# Patient Record
Sex: FEMALE | Race: WHITE | Employment: FULL TIME | URBAN - METROPOLITAN AREA
[De-identification: names, ages, dates, MRNs, and addresses within clinical notes are randomized per-mention and may not be internally consistent; named-entity substitution may affect disease eponyms.]

---

## 2017-01-05 ENCOUNTER — TELEPHONE (OUTPATIENT)
Dept: SURGERY | Age: 37
End: 2017-01-05

## 2017-01-05 NOTE — TELEPHONE ENCOUNTER
Patient called office states her doctor was suppose to fax over paperwork a few weeks ago.  Paperwork has not been received, i have given patient our fax number she states she will call the doctor back to have him refax the documents

## 2017-01-27 ENCOUNTER — OFFICE VISIT (OUTPATIENT)
Dept: SURGERY | Age: 37
End: 2017-01-27

## 2017-01-27 VITALS
BODY MASS INDEX: 43.4 KG/M2 | DIASTOLIC BLOOD PRESSURE: 69 MMHG | OXYGEN SATURATION: 95 % | WEIGHT: 293 LBS | TEMPERATURE: 98.6 F | SYSTOLIC BLOOD PRESSURE: 134 MMHG | RESPIRATION RATE: 16 BRPM | HEART RATE: 102 BPM | HEIGHT: 69 IN

## 2017-01-27 DIAGNOSIS — E66.01 MORBID OBESITY WITH BMI OF 50.0-59.9, ADULT (HCC): Primary | ICD-10-CM

## 2017-01-27 RX ORDER — ERGOCALCIFEROL 1.25 MG/1
CAPSULE ORAL
COMMUNITY
Start: 2017-01-04 | End: 2019-02-26

## 2017-01-27 RX ORDER — DIPHENHYDRAMINE HCL 25 MG
TABLET,DISINTEGRATING ORAL
Refills: 12 | COMMUNITY
Start: 2016-12-16 | End: 2019-05-28

## 2017-01-27 RX ORDER — FAMOTIDINE 20 MG/1
TABLET, FILM COATED ORAL
COMMUNITY
Start: 2017-01-18 | End: 2017-11-08

## 2017-01-27 NOTE — PROGRESS NOTES
1. Have you been to the ER, urgent care clinic since your last visit? Hospitalized since your last visit?no    2. Have you seen or consulted any other health care providers outside of the [address] [place] [address] since your last visit? Include any pap smears or colon screening.  no

## 2017-01-30 NOTE — PROGRESS NOTES
Bariatric Surgery Consultation     Subjective:      The patient is a 38 y.o. obese  female with a Body mass index of Body mass index is 58.77 kg/(m^2). She has completed her insurance mandated 6 month physician supervised diet and excise waiting period.       Bariatric comorbidities present are        Patient Active Problem List   Diagnosis Code    Obesity in pregnancy with antepartum complication H08.130    Supervision of other high-risk pregnancy(V23.89)      Rubella non-immune status, antepartum O99.89, Z28.3    Papanicolaou smear of cervix with atypical squamous cells of undetermined significance (ASC-US) R87.610    Cervical high risk human papillomavirus (HPV) DNA test positive R87.810    Condyloma acuminata of vulva during pregnancy O98.319, A63.0    Normal labor O80, Z37.9   . The patient desires laparoscopic gastric bypass surgery for surgical weight loss. The patients goal weight is 190lb. The highest acceptable weight is 230lb  These goals are consistent with expected outcomes of their desired operation.  her Medical goals are to prevent development of diabetes and heard disease. her qualty of life goals are to have more energy and less pain so that she can do more with her daughter.          Patient Active Problem List     Diagnosis Date Noted    Normal labor 07/27/2015    Condyloma acuminata of vulva during pregnancy 07/08/2015    Papanicolaou smear of cervix with atypical squamous cells of undetermined significance (ASC-US) 06/04/2015    Cervical high risk human papillomavirus (HPV) DNA test positive 06/04/2015    Rubella non-immune status, antepartum 04/23/2015    Obesity in pregnancy with antepartum complication 69/05/0019    Supervision of other high-risk pregnancy(V23.89) 04/20/2015            Past Surgical History   Procedure Laterality Date    Hx oophorectomy   2015             History   Substance Use Topics    Smoking status: Former Smoker -- 1.00 packs/day for 15 years     Quit date: 11/01/2014    Smokeless tobacco: Never Used    Alcohol Use: 1.2 oz/week       1 Shots of liquor, 1 Cans of beer per week         Comment: rare            Family History   Problem Relation Age of Onset   [address] [facility] Arthritis-rheumatoid Mother      COPD Mother      Diabetes Father      Stroke Father      Hypertension Father      Cancer Maternal Uncle      Breast Cancer Neg Hx      Ovarian Cancer Neg Hx      Uterine Cancer Neg Hx      Diabetes Paternal Grandmother                Prior to Admission medications    Medication Sig Start Date End Date Taking? Authorizing Provider   PROAIR HFA 90 mcg/actuation inhaler Take 2 Puffs by inhalation as needed. Every 4 to 6 hours as needed 3/4/16   Yes Historical Provider   doxycycline (VIBRAMYCIN) 100 mg capsule Take 2 Caps by mouth daily. 3/4/16   Yes Historical Provider   HYDROcodone-acetaminophen (NORCO)  mg tablet   2/22/16   Yes Historical Provider   Omeprazole delayed release (PRILOSEC D/R) 20 mg tablet Take 1 Tab by mouth daily. 2/15/16   Yes Payton Weston MD   OTHER Indications: inhaler, uses as needed       Phys Other, MD   HYDROcodone-acetaminophen (NORCO) 5-325 mg per tablet Take 1 Tab by mouth every four (4) hours as needed for Pain. Max Daily Amount: 6 Tabs. 2/15/16     Payton Weston MD           Allergies   Allergen Reactions    Percocet [Oxycodone-Acetaminophen] Nausea and Vomiting          Review of Systems:   A comprehensive review of systems was negative except for that written in the HPI.     Objective:     Blood pressure 134/69, pulse (!) 102, temperature 98.6 °F (37 °C), temperature source Oral, resp.  rate 16, height 5' 9\" (1.753 m), weight (!) 398 lb (180.5 kg), last menstrual period 12/14/2016, SpO2 95 %.     Physical Exam:  GENERAL: alert, cooperative, no distress, appears stated age, morbidly obese, EYE: conjunctivae/corneas clear., EOM's intact., LYMPHATIC: Cervical, supraclavicular, nodes normal. , LUNG: clear to auscultation bilaterally, HEART: regular rate and rhythm, S1, S2 normal, no murmur, click, rub or gallop, ABDOMEN: soft, non-tender. Bowel sounds normal. No masses, no organomegaly, NEUROLOGIC: AOx3. Gait normal. motor strength normal and symmetric. sensation grossly intact.        Assessment:      Morbid obesity with comorbidity     Plan:      laparoscopic gastric bypass surgery     This is a 38 y.o. female with a BMI of Body mass index is Body mass index is 58.77 kg/(m^2). and the weight-related co-morbidties of pain in weight bearing joints. Sara Sanders meets the NIH criteria for bariatric surgery based upon the BMI of Body mass index is Body mass index is 58.77 kg/(m^2). and multiple weight-related co-morbidties. Sara Sanders has elected laparoscopic cesar-en-Y gastric bypass as her intervention of choice for treatment of morbid obestiy through surgical means secondary to its long term history of success.      In the office today, following Gigi's history and physical examination, a 30 minute discussion regarding the anatomic alterations for the laparoscopic cesar-en-Y gastric bypass was undertaken. The dietary expectations and the patient and physician dependent factors for success were thoroughly discussed, to include the need for interval follow-up and long-term dietary changes associated with success. The possible complications of the cesar-en-Y gastric bypass were also discussed, to include; staple line leak, bleeding, stricture, infection, internal hernia and pouch dilation. Specific weight related outcomes for success were also discussed with an emphasis on careful and close follow-up with the first year. The patient expressed an understanding of the above factors, and her questions were answered in their entirety.     In addition, the patient attended a 1.5 hour power point seminar regarding obesity, surgical weight loss including, adjustable gastric band, gastric bypass, and sleeve gastrectomy.  This discussion contrasted the different surgical techniques, mechanisms of actions and expected outcomes, and surgical and medical risks associated with each procedure. During this seminar, there was a long question and answer session where each questions was answered until there were no additional questions.      Today, the patient had all of her questions answered and desires to proceed with qualification for bariatric surgery initially choosing cesar-en-Y gastric bypass as her surgical option.

## 2017-01-31 LAB
H PYLORI IGA SER-ACNC: <9 UNITS (ref 0–8.9)
HBA1C MFR BLD: 5.7 % (ref 4.8–5.6)
TSH SERPL DL<=0.005 MIU/L-ACNC: 8.6 UIU/ML (ref 0.45–4.5)

## 2017-11-08 ENCOUNTER — APPOINTMENT (OUTPATIENT)
Dept: ULTRASOUND IMAGING | Age: 37
End: 2017-11-08
Attending: EMERGENCY MEDICINE
Payer: MEDICAID

## 2017-11-08 ENCOUNTER — APPOINTMENT (OUTPATIENT)
Dept: CT IMAGING | Age: 37
End: 2017-11-08
Attending: EMERGENCY MEDICINE
Payer: MEDICAID

## 2017-11-08 ENCOUNTER — APPOINTMENT (OUTPATIENT)
Dept: GENERAL RADIOLOGY | Age: 37
End: 2017-11-08
Attending: EMERGENCY MEDICINE
Payer: MEDICAID

## 2017-11-08 ENCOUNTER — HOSPITAL ENCOUNTER (EMERGENCY)
Age: 37
Discharge: HOME | End: 2017-11-08
Attending: EMERGENCY MEDICINE
Payer: MEDICAID

## 2017-11-08 VITALS
HEIGHT: 68 IN | OXYGEN SATURATION: 95 % | BODY MASS INDEX: 44.41 KG/M2 | HEART RATE: 91 BPM | SYSTOLIC BLOOD PRESSURE: 127 MMHG | RESPIRATION RATE: 15 BRPM | DIASTOLIC BLOOD PRESSURE: 66 MMHG | WEIGHT: 293 LBS | TEMPERATURE: 97.4 F

## 2017-11-08 DIAGNOSIS — R10.13 ABDOMINAL PAIN, EPIGASTRIC: Primary | ICD-10-CM

## 2017-11-08 DIAGNOSIS — R11.2 NON-INTRACTABLE VOMITING WITH NAUSEA, UNSPECIFIED VOMITING TYPE: ICD-10-CM

## 2017-11-08 LAB
ALBUMIN SERPL-MCNC: 3.5 G/DL (ref 3.5–5)
ALBUMIN/GLOB SERPL: 0.7 {RATIO} (ref 1.1–2.2)
ALP SERPL-CCNC: 77 U/L (ref 45–117)
ALT SERPL-CCNC: 24 U/L (ref 12–78)
ANION GAP SERPL CALC-SCNC: 13 MMOL/L (ref 5–15)
AST SERPL-CCNC: 22 U/L (ref 15–37)
BASOPHILS # BLD: 0 K/UL (ref 0–0.1)
BASOPHILS NFR BLD: 0 % (ref 0–1)
BILIRUB SERPL-MCNC: 0.4 MG/DL (ref 0.2–1)
BUN SERPL-MCNC: 9 MG/DL (ref 6–20)
BUN/CREAT SERPL: 12 (ref 12–20)
CALCIUM SERPL-MCNC: 9.4 MG/DL (ref 8.5–10.1)
CHLORIDE SERPL-SCNC: 104 MMOL/L (ref 97–108)
CO2 SERPL-SCNC: 24 MMOL/L (ref 21–32)
CREAT SERPL-MCNC: 0.74 MG/DL (ref 0.55–1.02)
DIFFERENTIAL METHOD BLD: ABNORMAL
EOSINOPHIL # BLD: 0.1 K/UL (ref 0–0.4)
EOSINOPHIL NFR BLD: 1 % (ref 0–7)
ERYTHROCYTE [DISTWIDTH] IN BLOOD BY AUTOMATED COUNT: 15 % (ref 11.5–14.5)
GLOBULIN SER CALC-MCNC: 4.9 G/DL (ref 2–4)
GLUCOSE SERPL-MCNC: 114 MG/DL (ref 65–100)
HCG UR QL: NEGATIVE
HCT VFR BLD AUTO: 41.9 % (ref 35–47)
HGB BLD-MCNC: 13.2 G/DL (ref 11.5–16)
LIPASE SERPL-CCNC: 109 U/L (ref 73–393)
LYMPHOCYTES # BLD: 1.8 K/UL (ref 0.8–3.5)
LYMPHOCYTES NFR BLD: 14 % (ref 12–49)
MCH RBC QN AUTO: 27.7 PG (ref 26–34)
MCHC RBC AUTO-ENTMCNC: 31.5 G/DL (ref 30–36.5)
MCV RBC AUTO: 88 FL (ref 80–99)
MONOCYTES # BLD: 0.7 K/UL (ref 0–1)
MONOCYTES NFR BLD: 6 % (ref 5–13)
NEUTS SEG # BLD: 10.1 K/UL (ref 1.8–8)
NEUTS SEG NFR BLD: 79 % (ref 32–75)
PLATELET # BLD AUTO: 444 K/UL (ref 150–400)
POTASSIUM SERPL-SCNC: 3.5 MMOL/L (ref 3.5–5.1)
PROT SERPL-MCNC: 8.4 G/DL (ref 6.4–8.2)
RBC # BLD AUTO: 4.76 M/UL (ref 3.8–5.2)
SODIUM SERPL-SCNC: 141 MMOL/L (ref 136–145)
WBC # BLD AUTO: 12.7 K/UL (ref 3.6–11)

## 2017-11-08 PROCEDURE — 85025 COMPLETE CBC W/AUTO DIFF WBC: CPT | Performed by: EMERGENCY MEDICINE

## 2017-11-08 PROCEDURE — 74177 CT ABD & PELVIS W/CONTRAST: CPT

## 2017-11-08 PROCEDURE — 81025 URINE PREGNANCY TEST: CPT

## 2017-11-08 PROCEDURE — 74022 RADEX COMPL AQT ABD SERIES: CPT

## 2017-11-08 PROCEDURE — 96376 TX/PRO/DX INJ SAME DRUG ADON: CPT

## 2017-11-08 PROCEDURE — 36415 COLL VENOUS BLD VENIPUNCTURE: CPT | Performed by: EMERGENCY MEDICINE

## 2017-11-08 PROCEDURE — 74011250636 HC RX REV CODE- 250/636: Performed by: EMERGENCY MEDICINE

## 2017-11-08 PROCEDURE — 80053 COMPREHEN METABOLIC PANEL: CPT | Performed by: EMERGENCY MEDICINE

## 2017-11-08 PROCEDURE — 99283 EMERGENCY DEPT VISIT LOW MDM: CPT

## 2017-11-08 PROCEDURE — 96375 TX/PRO/DX INJ NEW DRUG ADDON: CPT

## 2017-11-08 PROCEDURE — 83690 ASSAY OF LIPASE: CPT | Performed by: EMERGENCY MEDICINE

## 2017-11-08 PROCEDURE — 76705 ECHO EXAM OF ABDOMEN: CPT

## 2017-11-08 PROCEDURE — 74011250637 HC RX REV CODE- 250/637: Performed by: EMERGENCY MEDICINE

## 2017-11-08 PROCEDURE — 74011000250 HC RX REV CODE- 250: Performed by: EMERGENCY MEDICINE

## 2017-11-08 PROCEDURE — 96374 THER/PROPH/DIAG INJ IV PUSH: CPT

## 2017-11-08 PROCEDURE — 74011636320 HC RX REV CODE- 636/320: Performed by: EMERGENCY MEDICINE

## 2017-11-08 RX ORDER — FAMOTIDINE 10 MG/ML
20 INJECTION INTRAVENOUS
Status: COMPLETED | OUTPATIENT
Start: 2017-11-08 | End: 2017-11-08

## 2017-11-08 RX ORDER — PHENOL/SODIUM PHENOLATE
20 AEROSOL, SPRAY (ML) MUCOUS MEMBRANE 2 TIMES DAILY
Qty: 20 TAB | Refills: 0 | Status: SHIPPED | OUTPATIENT
Start: 2017-11-08 | End: 2017-11-18

## 2017-11-08 RX ORDER — FLUTICASONE PROPIONATE AND SALMETEROL 250; 50 UG/1; UG/1
1 POWDER RESPIRATORY (INHALATION) EVERY 12 HOURS
COMMUNITY
End: 2019-02-26

## 2017-11-08 RX ORDER — ONDANSETRON 2 MG/ML
4 INJECTION INTRAMUSCULAR; INTRAVENOUS
Status: COMPLETED | OUTPATIENT
Start: 2017-11-08 | End: 2017-11-08

## 2017-11-08 RX ORDER — SUCRALFATE 1 G/10ML
1 SUSPENSION ORAL 4 TIMES DAILY
Qty: 414 ML | Refills: 0 | Status: SHIPPED | OUTPATIENT
Start: 2017-11-08 | End: 2017-11-18

## 2017-11-08 RX ORDER — MORPHINE SULFATE 10 MG/ML
6 INJECTION, SOLUTION INTRAMUSCULAR; INTRAVENOUS
Status: COMPLETED | OUTPATIENT
Start: 2017-11-08 | End: 2017-11-08

## 2017-11-08 RX ORDER — ONDANSETRON 8 MG/1
8 TABLET, ORALLY DISINTEGRATING ORAL
Qty: 12 TAB | Refills: 0 | Status: SHIPPED | OUTPATIENT
Start: 2017-11-08 | End: 2017-11-15

## 2017-11-08 RX ORDER — CIPROFLOXACIN 500 MG/1
TABLET ORAL 2 TIMES DAILY
COMMUNITY
End: 2018-01-12

## 2017-11-08 RX ADMIN — FAMOTIDINE 20 MG: 10 INJECTION, SOLUTION INTRAVENOUS at 18:44

## 2017-11-08 RX ADMIN — ONDANSETRON 4 MG: 2 INJECTION INTRAMUSCULAR; INTRAVENOUS at 21:26

## 2017-11-08 RX ADMIN — LIDOCAINE HYDROCHLORIDE 40 ML: 20 SOLUTION ORAL; TOPICAL at 18:45

## 2017-11-08 RX ADMIN — MORPHINE SULFATE 6 MG: 10 INJECTION, SOLUTION INTRAVENOUS at 20:50

## 2017-11-08 RX ADMIN — IOPAMIDOL 100 ML: 755 INJECTION, SOLUTION INTRAVENOUS at 20:33

## 2017-11-08 RX ADMIN — ONDANSETRON 4 MG: 2 INJECTION INTRAMUSCULAR; INTRAVENOUS at 18:44

## 2017-11-08 NOTE — Clinical Note
- Zofran as needed for nausea/vomiting. 
- Begin Carafate and Prilosec pending follow-up with Dr. Kim Barajas. - Return to ED for fever, increased pain, persistent vomiting, any other concerns.

## 2017-11-08 NOTE — ED PROVIDER NOTES
Patient is a 42 y.o. female presenting with vomiting. The history is provided by the patient. Vomiting    This is a new problem. The current episode started 2 days ago. The problem occurs 5 to 10 times per day. The problem has been gradually worsening. The emesis has an appearance of stomach contents. Patient reports a subjective fever - was not measured. Associated symptoms include chills, a fever and abdominal pain. Pertinent negatives include no diarrhea and no cough. Past Medical History:   Diagnosis Date    Asthma     GERD (gastroesophageal reflux disease)     Second hand smoke exposure        Past Surgical History:   Procedure Laterality Date    HX OOPHORECTOMY  2015         Family History:   Problem Relation Age of Onset   [de-identified] Arthritis-rheumatoid Mother     COPD Mother     Diabetes Father     Stroke Father     Hypertension Father     Cancer Maternal Uncle     Diabetes Paternal Grandmother     Breast Cancer Neg Hx     Ovarian Cancer Neg Hx     Uterine Cancer Neg Hx        Social History     Social History    Marital status: SINGLE     Spouse name: N/A    Number of children: N/A    Years of education: N/A     Occupational History    Not on file. Social History Main Topics    Smoking status: Former Smoker     Packs/day: 1.00     Years: 15.00     Quit date: 11/1/2014    Smokeless tobacco: Never Used    Alcohol use 1.2 oz/week     1 Cans of beer, 1 Shots of liquor per week      Comment: rare    Drug use: No    Sexual activity: No     Other Topics Concern    Not on file     Social History Narrative         ALLERGIES: Percocet [oxycodone-acetaminophen]    Review of Systems   Constitutional: Positive for chills and fever. Respiratory: Negative for cough. Gastrointestinal: Positive for abdominal pain and vomiting. Negative for diarrhea. Genitourinary:        Patient on cipro for UTI   Musculoskeletal: Negative for back pain and neck pain.    All other systems reviewed and are negative. Vitals:    11/08/17 1807   BP: 139/88   Pulse: (!) 115   Resp: 26   Temp: 97.4 °F (36.3 °C)   SpO2: 96%   Weight: (!) 177.9 kg (392 lb 3.2 oz)   Height: 5' 8\" (1.727 m)            Physical Exam   Constitutional: She is oriented to person, place, and time. She appears well-developed and well-nourished. She appears distressed. HENT:   Head: Normocephalic and atraumatic. Mouth/Throat: Oropharynx is clear and moist.   Eyes: Conjunctivae and EOM are normal.   Neck: Normal range of motion. Cardiovascular: Regular rhythm, normal heart sounds and intact distal pulses. Tachycardia present. No murmur heard. Pulmonary/Chest: Effort normal and breath sounds normal. No stridor. Tachypnea noted. No respiratory distress. Abdominal: Soft. Bowel sounds are normal. There is tenderness in the right upper quadrant and epigastric area. There is no rebound and no guarding. Musculoskeletal: Normal range of motion. She exhibits no edema, tenderness or deformity. Neurological: She is alert and oriented to person, place, and time. No cranial nerve deficit. Skin: Skin is warm and dry. She is not diaphoretic. Psychiatric: She has a normal mood and affect. Nursing note and vitals reviewed. MDM  Number of Diagnoses or Management Options  Diagnosis management comments: Patient with upper abdominal pain - hx of gastritis in the past - check labs and get RUQ US to eval for biliary colic. Check labs for pancreatitis, check CXR to eval for perforation - r/o free air.  meds for symptoms    DDX - gastritis, pancreatitis, biliary colic, referred pain from UTI, medication side effect    6:58 PM  Change of shift. Care of patient signed over to Dr. Minoo Bruner. Handoff complete.        Amount and/or Complexity of Data Reviewed  Clinical lab tests: reviewed and ordered  Tests in the radiology section of CPT®: ordered      ED Course       Procedures

## 2017-11-09 NOTE — DISCHARGE INSTRUCTIONS
Abdominal Pain: Care Instructions  Your Care Instructions    Abdominal pain has many possible causes. Some aren't serious and get better on their own in a few days. Others need more testing and treatment. If your pain continues or gets worse, you need to be rechecked and may need more tests to find out what is wrong. You may need surgery to correct the problem. Don't ignore new symptoms, such as fever, nausea and vomiting, urination problems, pain that gets worse, and dizziness. These may be signs of a more serious problem. Your doctor may have recommended a follow-up visit in the next 8 to 12 hours. If you are not getting better, you may need more tests or treatment. The doctor has checked you carefully, but problems can develop later. If you notice any problems or new symptoms, get medical treatment right away. Follow-up care is a key part of your treatment and safety. Be sure to make and go to all appointments, and call your doctor if you are having problems. It's also a good idea to know your test results and keep a list of the medicines you take. How can you care for yourself at home? · Rest until you feel better. · To prevent dehydration, drink plenty of fluids, enough so that your urine is light yellow or clear like water. Choose water and other caffeine-free clear liquids until you feel better. If you have kidney, heart, or liver disease and have to limit fluids, talk with your doctor before you increase the amount of fluids you drink. · If your stomach is upset, eat mild foods, such as rice, dry toast or crackers, bananas, and applesauce. Try eating several small meals instead of two or three large ones. · Wait until 48 hours after all symptoms have gone away before you have spicy foods, alcohol, and drinks that contain caffeine. · Do not eat foods that are high in fat. · Avoid anti-inflammatory medicines such as aspirin, ibuprofen (Advil, Motrin), and naproxen (Aleve).  These can cause stomach upset. Talk to your doctor if you take daily aspirin for another health problem. When should you call for help? Call 911 anytime you think you may need emergency care. For example, call if:  ? · You passed out (lost consciousness). ? · You pass maroon or very bloody stools. ? · You vomit blood or what looks like coffee grounds. ? · You have new, severe belly pain. ?Call your doctor now or seek immediate medical care if:  ? · Your pain gets worse, especially if it becomes focused in one area of your belly. ? · You have a new or higher fever. ? · Your stools are black and look like tar, or they have streaks of blood. ? · You have unexpected vaginal bleeding. ? · You have symptoms of a urinary tract infection. These may include:  ¨ Pain when you urinate. ¨ Urinating more often than usual.  ¨ Blood in your urine. ? · You are dizzy or lightheaded, or you feel like you may faint. ? Watch closely for changes in your health, and be sure to contact your doctor if:  ? · You are not getting better after 1 day (24 hours). Where can you learn more? Go to http://arlene7mb Technologiesjoann.info/. Enter K976 in the search box to learn more about \"Abdominal Pain: Care Instructions. \"  Current as of: March 20, 2017  Content Version: 11.4  © 7345-1202 [employer]. Care instructions adapted under license by [employer] (which disclaims liability or warranty for this information). If you have questions about a medical condition or this instruction, always ask your healthcare professional. [place] [address] any warranty or liability for your use of this information. Nausea and Vomiting: Care Instructions  Your Care Instructions    When you are nauseated, you may feel weak and sweaty and notice a lot of saliva in your mouth. Nausea often leads to vomiting.  Most of the time you do not need to worry about nausea and vomiting, but they can be signs of other illnesses. Two common causes of nausea and vomiting are stomach flu and food poisoning. Nausea and vomiting from viral stomach flu will usually start to improve within 24 hours. Nausea and vomiting from food poisoning may last from 12 to 48 hours. The doctor has checked you carefully, but problems can develop later. If you notice any problems or new symptoms, get medical treatment right away. Follow-up care is a key part of your treatment and safety. Be sure to make and go to all appointments, and call your doctor if you are having problems. It's also a good idea to know your test results and keep a list of the medicines you take. How can you care for yourself at home? · To prevent dehydration, drink plenty of fluids, enough so that your urine is light yellow or clear like water. Choose water and other caffeine-free clear liquids until you feel better. If you have kidney, heart, or liver disease and have to limit fluids, talk with your doctor before you increase the amount of fluids you drink. · Rest in bed until you feel better. · When you are able to eat, try clear soups, mild foods, and liquids until all symptoms are gone for 12 to 48 hours. Other good choices include dry toast, crackers, cooked cereal, and gelatin dessert, such as Jell-O. When should you call for help? Call 911 anytime you think you may need emergency care. For example, call if:  ? · You passed out (lost consciousness). ?Call your doctor now or seek immediate medical care if:  ? · You have symptoms of dehydration, such as:  ¨ Dry eyes and a dry mouth. ¨ Passing only a little dark urine. ¨ Feeling thirstier than usual.   ? · You have new or worsening belly pain. ? · You have a new or higher fever. ? · You vomit blood or what looks like coffee grounds. ? Watch closely for changes in your health, and be sure to contact your doctor if:  ? · You have ongoing nausea and vomiting. ? · Your vomiting is getting worse.    ? · Your vomiting lasts longer than 2 days. ? · You are not getting better as expected. Where can you learn more? Go to http://arlene-joann.info/. Enter 25 [id number] in the search box to learn more about \"Nausea and Vomiting: Care Instructions. \"  Current as of: March 20, 2017  Content Version: 11.4  © 6866-1895 [employer] Care instructions adapted under license by [employer] (which disclaims liability or warranty for this information). If you have questions about a medical condition or this instruction, always ask your healthcare professional. [place] [address] any warranty or liability for your use of this information.

## 2017-11-09 NOTE — ED NOTES
7 PM  Change of shift. Care of patient taken over from Dr. Elías Bermudez; H&P reviewed, handoff complete. Awaiting U/S. Consider CT scan. U/S negative. Patient notes increased pain since U/S. Will proceed to CT scan. Patient notes hx ulcer. Followed by Dr. Betsy James. Plan Prilosec and Carafate pending. GI follow-up.

## 2017-11-09 NOTE — ED NOTES
The patient was discharged home by Dr Heath Magallanes in stable condition. The patient is alert and oriented, in no respiratory distress. The patient's diagnosis, condition and treatment were explained. The patient expressed understanding. A discharge plan has been developed. A  was not involved in the process. Aftercare instructions were given. Pt ambulatory out of the ED. Patient ambulatory to the bathroom, with a steady gait, to void again.

## 2017-11-09 NOTE — ED NOTES
Patient having nausea after the morphine; informed Dr Gabriella Long. \"I just really want a Tums. \" AIDET communication provided and informed of purposeful rounding to include collaboration of entire care team; patient acknowledged understanding.

## 2018-01-12 ENCOUNTER — HOSPITAL ENCOUNTER (EMERGENCY)
Age: 38
Discharge: HOME | End: 2018-01-12
Attending: EMERGENCY MEDICINE
Payer: MEDICAID

## 2018-01-12 VITALS
HEART RATE: 76 BPM | WEIGHT: 293 LBS | SYSTOLIC BLOOD PRESSURE: 128 MMHG | TEMPERATURE: 97.7 F | OXYGEN SATURATION: 98 % | BODY MASS INDEX: 59 KG/M2 | DIASTOLIC BLOOD PRESSURE: 79 MMHG | RESPIRATION RATE: 16 BRPM

## 2018-01-12 DIAGNOSIS — S39.012A LUMBAR STRAIN, INITIAL ENCOUNTER: Primary | ICD-10-CM

## 2018-01-12 PROCEDURE — 74011250636 HC RX REV CODE- 250/636: Performed by: NURSE PRACTITIONER

## 2018-01-12 PROCEDURE — 96372 THER/PROPH/DIAG INJ SC/IM: CPT

## 2018-01-12 PROCEDURE — 99283 EMERGENCY DEPT VISIT LOW MDM: CPT

## 2018-01-12 PROCEDURE — 74011250637 HC RX REV CODE- 250/637: Performed by: NURSE PRACTITIONER

## 2018-01-12 RX ORDER — FAMOTIDINE 20 MG/1
20 TABLET, FILM COATED ORAL DAILY
COMMUNITY
End: 2019-07-08

## 2018-01-12 RX ORDER — CYCLOBENZAPRINE HCL 10 MG
5 TABLET ORAL
Status: COMPLETED | OUTPATIENT
Start: 2018-01-12 | End: 2018-01-12

## 2018-01-12 RX ORDER — LEVOTHYROXINE SODIUM 125 UG/1
175 TABLET ORAL
COMMUNITY
End: 2019-05-28

## 2018-01-12 RX ORDER — KETOROLAC TROMETHAMINE 30 MG/ML
30 INJECTION, SOLUTION INTRAMUSCULAR; INTRAVENOUS
Status: COMPLETED | OUTPATIENT
Start: 2018-01-12 | End: 2018-01-12

## 2018-01-12 RX ORDER — CYCLOBENZAPRINE HCL 5 MG
5 TABLET ORAL
Qty: 12 TAB | Refills: 0 | Status: SHIPPED | OUTPATIENT
Start: 2018-01-12 | End: 2018-01-15

## 2018-01-12 RX ADMIN — CYCLOBENZAPRINE HYDROCHLORIDE 5 MG: 10 TABLET, FILM COATED ORAL at 14:47

## 2018-01-12 RX ADMIN — KETOROLAC TROMETHAMINE 30 MG: 30 INJECTION, SOLUTION INTRAMUSCULAR at 14:47

## 2018-01-12 NOTE — ED TRIAGE NOTES
Patient brought to ED treatment area via wheelchair for complaint of \"I was doing a lot of lifting and moving and I woke up yesterday morning with my lower back hurting. I think I may have a slipped disc. \" Patient denies history of slipped disc in past. Reports problems with lower back. Took tylenol without relief.

## 2018-01-12 NOTE — ED NOTES
Pt was discharged and given instructions by JORDAN Peck NP . Pt verbalized good understanding of all discharge instructions,prescriptions and F/U care. All questions answered. Pt in stable condition on discharge.

## 2018-01-12 NOTE — ED PROVIDER NOTES
HPI Comments: 42 y.o. female with past medical history significant for asthma and obesity who presents from home with chief complaint of low back pain. She states that 2 nights ago she was getting up off her bathroom floor to get her daughter out of the bathtub when she twisted her back causing her pain. Later that night she was moving a TV and thinks she \"overdid it\". She states that she thinks she pulled a muscle. Has a history of back pain which she attributes to her obesity. Has right sciatica intermittently at baseline. Denies sciatica at this time. Was ambulatory into the ED, but states that it hurts to ambulate. Denies recent steroid use, denies IV drug use, has no history of cancer. Denies numbness, tingling, paresthesias. Has been taking Tylenol at home with no pain relief. Reports shortness of breath at baseline from being obese, denies anything acute. There are no other acute medical concerns at this time. Denies fever, chills, headache, vision changes, chest pain, abdominal pain, vomiting/diarrhea/constipation,  symptoms. LMP 1/2/18  Social hx: denies smoking, occasional ETOH use, denies illicit drug use  PCP: Sonja Dumas MD        Patient is a 42 y.o. female presenting with back pain. The history is provided by the patient. Back Pain    Pertinent negatives include no chest pain, no fever, no numbness, no abdominal pain, no dysuria and no weakness.         Past Medical History:   Diagnosis Date    Asthma     GERD (gastroesophageal reflux disease)     Second hand smoke exposure        Past Surgical History:   Procedure Laterality Date    HX OOPHORECTOMY  2015         Family History:   Problem Relation Age of Onset   Jason Solis Arthritis-rheumatoid Mother     COPD Mother     Diabetes Father     Stroke Father     Hypertension Father     Cancer Maternal Uncle     Diabetes Paternal Grandmother     Breast Cancer Neg Hx     Ovarian Cancer Neg Hx     Uterine Cancer Neg Hx        Social History Social History    Marital status: SINGLE     Spouse name: N/A    Number of children: N/A    Years of education: N/A     Occupational History    Not on file. Social History Main Topics    Smoking status: Former Smoker     Packs/day: 1.00     Years: 15.00     Quit date: 11/1/2014    Smokeless tobacco: Never Used    Alcohol use 1.2 oz/week     1 Cans of beer, 1 Shots of liquor per week      Comment: rare    Drug use: No    Sexual activity: No     Other Topics Concern    Not on file     Social History Narrative         ALLERGIES: Percocet [oxycodone-acetaminophen]    Review of Systems   Constitutional: Negative for chills and fever. HENT: Negative for facial swelling. Eyes: Negative for visual disturbance. Respiratory: Negative for cough and shortness of breath. Cardiovascular: Negative for chest pain. Gastrointestinal: Negative for abdominal pain, constipation, diarrhea and vomiting. Genitourinary: Negative for difficulty urinating and dysuria. Musculoskeletal: Positive for back pain. Negative for gait problem and neck pain. Skin: Negative for rash. Neurological: Negative for dizziness, syncope, weakness and numbness. Psychiatric/Behavioral: Negative for confusion and decreased concentration. All other systems reviewed and are negative. Vitals:    01/12/18 1412   BP: 136/68   Pulse: 89   Resp: 18   Temp: 97.7 °F (36.5 °C)   SpO2: 97%   Weight: (!) 176 kg (388 lb)            Physical Exam   Constitutional: She is oriented to person, place, and time. She appears well-developed and well-nourished. No distress. HENT:   Head: Normocephalic and atraumatic. Eyes: Conjunctivae and EOM are normal. Pupils are equal, round, and reactive to light. Right eye exhibits no discharge. Left eye exhibits no discharge. Neck: Normal range of motion. Neck supple. No tracheal deviation present. No thyromegaly present.    Cardiovascular: Normal rate, regular rhythm, normal heart sounds and intact distal pulses. Exam reveals no gallop and no friction rub. No murmur heard. Pulmonary/Chest: Effort normal and breath sounds normal. No respiratory distress. She has no wheezes. She has no rales. Abdominal: Soft. She exhibits no distension. There is no tenderness. Musculoskeletal:        Lumbar back: She exhibits tenderness (left paraspinal tenderness). She exhibits no bony tenderness, no edema and no deformity. Back:    Exam limited by body habitus. No bony tenderness, step-offs, or deformities found on exam    Lymphadenopathy:     She has no cervical adenopathy. Neurological: She is alert and oriented to person, place, and time. She has normal strength. No cranial nerve deficit or sensory deficit. She exhibits normal muscle tone. Coordination normal. GCS eye subscore is 4. GCS verbal subscore is 5. GCS motor subscore is 6.   5/5 strength, no loss of sensation    Skin: Skin is warm and dry. No rash noted. Psychiatric: She has a normal mood and affect. Her behavior is normal. Judgment and thought content normal.   Nursing note and vitals reviewed. MDM  Number of Diagnoses or Management Options  Lumbar strain, initial encounter:   Diagnosis management comments: The patient is in overall good health. The patient denies a history of IV drug abuse, skin infections, or chronic back problems. The patient has low back pain without signs of spinal cord compression, cauda equina syndrome, infection, abscess, aneurysm, or other medically serious etiology. The patient is neurologically intact. Given the low risk of these diagnoses further testing and evaluation for these possibilities does not appear to be indicated at this time. The patient has been instructed to return if the symptoms worsen or change in any way. Discussed with patient the nature of her symptoms and she is comfortable not getting a x-ray at this time.      Likely a lumbar strain     Plan: stable for discharge, patient states that she does not tolerate ibuprofen so she will treat her pain with Tylenol. Will give prescription for flexeril. Patient instructed to gradually increase movement, alternate heat and ice, as well as some back exercises. Told to follow-up with her PCP and given return precautions to come back to the ED. She verbalized understanding of this plan     ED Course     2:48 PM  Discussed treatment options with the patient and mutually agreed not to get a lumbar film at this time. Will treat patient's pain and have her follow-up with PCP if symptoms persist. Attending RAQUEL Mckeon in agreement with this plan of care  Graeme Moon NP    3:26 PM  Patient still having some pain but states that she feels a little better. Ready for discharge.  Will follow-up with her PCP and return to the ED for any concerning symptoms   Graeme Moon NP      Procedures

## 2018-05-06 ENCOUNTER — HOSPITAL ENCOUNTER (EMERGENCY)
Age: 38
Discharge: HOME | End: 2018-05-06
Attending: EMERGENCY MEDICINE
Payer: MEDICAID

## 2018-05-06 VITALS
SYSTOLIC BLOOD PRESSURE: 142 MMHG | OXYGEN SATURATION: 100 % | BODY MASS INDEX: 44.41 KG/M2 | TEMPERATURE: 98.1 F | HEIGHT: 68 IN | DIASTOLIC BLOOD PRESSURE: 79 MMHG | RESPIRATION RATE: 20 BRPM | WEIGHT: 293 LBS | HEART RATE: 80 BPM

## 2018-05-06 DIAGNOSIS — R19.7 DIARRHEA, UNSPECIFIED TYPE: ICD-10-CM

## 2018-05-06 DIAGNOSIS — R10.84 ABDOMINAL PAIN, GENERALIZED: Primary | ICD-10-CM

## 2018-05-06 DIAGNOSIS — R11.2 NAUSEA AND VOMITING, INTRACTABILITY OF VOMITING NOT SPECIFIED, UNSPECIFIED VOMITING TYPE: ICD-10-CM

## 2018-05-06 LAB
ALBUMIN SERPL-MCNC: 3.7 G/DL (ref 3.5–5)
ALBUMIN/GLOB SERPL: 0.8 {RATIO} (ref 1.1–2.2)
ALP SERPL-CCNC: 86 U/L (ref 45–117)
ALT SERPL-CCNC: 29 U/L (ref 12–78)
ANION GAP SERPL CALC-SCNC: 12 MMOL/L (ref 5–15)
APPEARANCE UR: ABNORMAL
AST SERPL-CCNC: 18 U/L (ref 15–37)
BACTERIA URNS QL MICRO: ABNORMAL /HPF
BASOPHILS # BLD: 0 K/UL (ref 0–0.1)
BASOPHILS NFR BLD: 0 % (ref 0–1)
BILIRUB DIRECT SERPL-MCNC: <0.1 MG/DL (ref 0–0.2)
BILIRUB SERPL-MCNC: 0.4 MG/DL (ref 0.2–1)
BILIRUB UR QL CFM: NEGATIVE
BUN SERPL-MCNC: 8 MG/DL (ref 6–20)
BUN/CREAT SERPL: 10 (ref 12–20)
CALCIUM SERPL-MCNC: 9.3 MG/DL (ref 8.5–10.1)
CHLORIDE SERPL-SCNC: 102 MMOL/L (ref 97–108)
CO2 SERPL-SCNC: 25 MMOL/L (ref 21–32)
COLOR UR: ABNORMAL
CREAT SERPL-MCNC: 0.79 MG/DL (ref 0.55–1.02)
DIFFERENTIAL METHOD BLD: ABNORMAL
EOSINOPHIL # BLD: 0 K/UL (ref 0–0.4)
EOSINOPHIL NFR BLD: 0 % (ref 0–7)
EPITH CASTS URNS QL MICRO: ABNORMAL /LPF
ERYTHROCYTE [DISTWIDTH] IN BLOOD BY AUTOMATED COUNT: 14.5 % (ref 11.5–14.5)
GLOBULIN SER CALC-MCNC: 4.7 G/DL (ref 2–4)
GLUCOSE SERPL-MCNC: 131 MG/DL (ref 65–100)
GLUCOSE UR STRIP.AUTO-MCNC: NEGATIVE MG/DL
HCG UR QL: NEGATIVE
HCT VFR BLD AUTO: 40.8 % (ref 35–47)
HGB BLD-MCNC: 13.2 G/DL (ref 11.5–16)
HGB UR QL STRIP: NEGATIVE
KETONES UR QL STRIP.AUTO: 15 MG/DL
LEUKOCYTE ESTERASE UR QL STRIP.AUTO: NEGATIVE
LIPASE SERPL-CCNC: 107 U/L (ref 73–393)
LYMPHOCYTES # BLD: 1 K/UL (ref 0.8–3.5)
LYMPHOCYTES NFR BLD: 8 % (ref 12–49)
MCH RBC QN AUTO: 27.4 PG (ref 26–34)
MCHC RBC AUTO-ENTMCNC: 32.4 G/DL (ref 30–36.5)
MCV RBC AUTO: 84.8 FL (ref 80–99)
MONOCYTES # BLD: 0.5 K/UL (ref 0–1)
MONOCYTES NFR BLD: 4 % (ref 5–13)
MUCOUS THREADS URNS QL MICRO: ABNORMAL /LPF
NEUTS SEG # BLD: 11.2 K/UL (ref 1.8–8)
NEUTS SEG NFR BLD: 88 % (ref 32–75)
NITRITE UR QL STRIP.AUTO: NEGATIVE
PH UR STRIP: 6.5 [PH] (ref 5–8)
PLATELET # BLD AUTO: 484 K/UL (ref 150–400)
PMV BLD AUTO: 8.6 FL (ref 8.9–12.9)
POTASSIUM SERPL-SCNC: 3.5 MMOL/L (ref 3.5–5.1)
PROT SERPL-MCNC: 8.4 G/DL (ref 6.4–8.2)
PROT UR STRIP-MCNC: ABNORMAL MG/DL
RBC # BLD AUTO: 4.81 M/UL (ref 3.8–5.2)
RBC #/AREA URNS HPF: ABNORMAL /HPF (ref 0–5)
SODIUM SERPL-SCNC: 139 MMOL/L (ref 136–145)
SP GR UR REFRACTOMETRY: 1.02 (ref 1–1.03)
UA: UC IF INDICATED,UAUC: ABNORMAL
UROBILINOGEN UR QL STRIP.AUTO: 0.2 EU/DL (ref 0.2–1)
WBC # BLD AUTO: 12.7 K/UL (ref 3.6–11)
WBC URNS QL MICRO: ABNORMAL /HPF (ref 0–4)
XXWBCSUS: 0

## 2018-05-06 PROCEDURE — 74011250636 HC RX REV CODE- 250/636: Performed by: EMERGENCY MEDICINE

## 2018-05-06 PROCEDURE — 96361 HYDRATE IV INFUSION ADD-ON: CPT

## 2018-05-06 PROCEDURE — 87077 CULTURE AEROBIC IDENTIFY: CPT | Performed by: EMERGENCY MEDICINE

## 2018-05-06 PROCEDURE — 83690 ASSAY OF LIPASE: CPT | Performed by: EMERGENCY MEDICINE

## 2018-05-06 PROCEDURE — 80076 HEPATIC FUNCTION PANEL: CPT | Performed by: EMERGENCY MEDICINE

## 2018-05-06 PROCEDURE — 74011250637 HC RX REV CODE- 250/637: Performed by: EMERGENCY MEDICINE

## 2018-05-06 PROCEDURE — 81025 URINE PREGNANCY TEST: CPT

## 2018-05-06 PROCEDURE — 80048 BASIC METABOLIC PNL TOTAL CA: CPT | Performed by: EMERGENCY MEDICINE

## 2018-05-06 PROCEDURE — 36415 COLL VENOUS BLD VENIPUNCTURE: CPT | Performed by: EMERGENCY MEDICINE

## 2018-05-06 PROCEDURE — 81001 URINALYSIS AUTO W/SCOPE: CPT | Performed by: EMERGENCY MEDICINE

## 2018-05-06 PROCEDURE — 96374 THER/PROPH/DIAG INJ IV PUSH: CPT

## 2018-05-06 PROCEDURE — 87186 SC STD MICRODIL/AGAR DIL: CPT | Performed by: EMERGENCY MEDICINE

## 2018-05-06 PROCEDURE — 99284 EMERGENCY DEPT VISIT MOD MDM: CPT

## 2018-05-06 PROCEDURE — 85025 COMPLETE CBC W/AUTO DIFF WBC: CPT | Performed by: EMERGENCY MEDICINE

## 2018-05-06 PROCEDURE — 87086 URINE CULTURE/COLONY COUNT: CPT | Performed by: EMERGENCY MEDICINE

## 2018-05-06 PROCEDURE — 74011000250 HC RX REV CODE- 250: Performed by: EMERGENCY MEDICINE

## 2018-05-06 RX ORDER — DICYCLOMINE HYDROCHLORIDE 10 MG/1
10 CAPSULE ORAL 4 TIMES DAILY
Qty: 15 CAP | Refills: 0 | Status: SHIPPED | OUTPATIENT
Start: 2018-05-06 | End: 2018-05-11

## 2018-05-06 RX ORDER — ONDANSETRON 2 MG/ML
4 INJECTION INTRAMUSCULAR; INTRAVENOUS
Status: COMPLETED | OUTPATIENT
Start: 2018-05-06 | End: 2018-05-06

## 2018-05-06 RX ORDER — DICYCLOMINE HYDROCHLORIDE 10 MG/1
10 CAPSULE ORAL 4 TIMES DAILY
Status: DISCONTINUED | OUTPATIENT
Start: 2018-05-06 | End: 2018-05-06 | Stop reason: HOSPADM

## 2018-05-06 RX ORDER — LOPERAMIDE HCL 2 MG
2 TABLET ORAL
Qty: 15 TAB | Refills: 0 | Status: SHIPPED | OUTPATIENT
Start: 2018-05-06 | End: 2018-07-21

## 2018-05-06 RX ORDER — ONDANSETRON 4 MG/1
4 TABLET, ORALLY DISINTEGRATING ORAL
Qty: 15 TAB | Refills: 0 | Status: SHIPPED | OUTPATIENT
Start: 2018-05-06 | End: 2018-07-21

## 2018-05-06 RX ADMIN — DICYCLOMINE HYDROCHLORIDE 10 MG: 10 CAPSULE ORAL at 18:23

## 2018-05-06 RX ADMIN — LIDOCAINE HYDROCHLORIDE 40 ML: 20 SOLUTION ORAL; TOPICAL at 18:38

## 2018-05-06 RX ADMIN — SODIUM CHLORIDE 1000 ML: 900 INJECTION, SOLUTION INTRAVENOUS at 18:22

## 2018-05-06 RX ADMIN — ONDANSETRON 4 MG: 2 INJECTION INTRAMUSCULAR; INTRAVENOUS at 18:23

## 2018-05-06 RX ADMIN — SODIUM CHLORIDE 1000 ML: 900 INJECTION, SOLUTION INTRAVENOUS at 18:53

## 2018-05-06 NOTE — DISCHARGE INSTRUCTIONS
Abdominal Pain: Care Instructions  Your Care Instructions    Abdominal pain has many possible causes. Some aren't serious and get better on their own in a few days. Others need more testing and treatment. If your pain continues or gets worse, you need to be rechecked and may need more tests to find out what is wrong. You may need surgery to correct the problem. Don't ignore new symptoms, such as fever, nausea and vomiting, urination problems, pain that gets worse, and dizziness. These may be signs of a more serious problem. Your doctor may have recommended a follow-up visit in the next 8 to 12 hours. If you are not getting better, you may need more tests or treatment. The doctor has checked you carefully, but problems can develop later. If you notice any problems or new symptoms, get medical treatment right away. Follow-up care is a key part of your treatment and safety. Be sure to make and go to all appointments, and call your doctor if you are having problems. It's also a good idea to know your test results and keep a list of the medicines you take. How can you care for yourself at home? · Rest until you feel better. · To prevent dehydration, drink plenty of fluids, enough so that your urine is light yellow or clear like water. Choose water and other caffeine-free clear liquids until you feel better. If you have kidney, heart, or liver disease and have to limit fluids, talk with your doctor before you increase the amount of fluids you drink. · If your stomach is upset, eat mild foods, such as rice, dry toast or crackers, bananas, and applesauce. Try eating several small meals instead of two or three large ones. · Wait until 48 hours after all symptoms have gone away before you have spicy foods, alcohol, and drinks that contain caffeine. · Do not eat foods that are high in fat. · Avoid anti-inflammatory medicines such as aspirin, ibuprofen (Advil, Motrin), and naproxen (Aleve).  These can cause stomach upset. Talk to your doctor if you take daily aspirin for another health problem. When should you call for help? Call 911 anytime you think you may need emergency care. For example, call if:  ? · You passed out (lost consciousness). ? · You pass maroon or very bloody stools. ? · You vomit blood or what looks like coffee grounds. ? · You have new, severe belly pain. ?Call your doctor now or seek immediate medical care if:  ? · Your pain gets worse, especially if it becomes focused in one area of your belly. ? · You have a new or higher fever. ? · Your stools are black and look like tar, or they have streaks of blood. ? · You have unexpected vaginal bleeding. ? · You have symptoms of a urinary tract infection. These may include:  ¨ Pain when you urinate. ¨ Urinating more often than usual.  ¨ Blood in your urine. ? · You are dizzy or lightheaded, or you feel like you may faint. ? Watch closely for changes in your health, and be sure to contact your doctor if:  ? · You are not getting better after 1 day (24 hours). Where can you learn more? Go to http://arlene-joann.info/. Enter B120 in the search box to learn more about \"Abdominal Pain: Care Instructions. \"  Current as of: March 20, 2017  Content Version: 11.4  © 2380-4429 [employer]. Care instructions adapted under license by [employer] (which disclaims liability or warranty for this information). If you have questions about a medical condition or this instruction, always ask your healthcare professional. [place] [address] any warranty or liability for your use of this information. Diarrhea: Care Instructions  Your Care Instructions    Diarrhea is loose, watery stools (bowel movements). The exact cause is often hard to find. Sometimes diarrhea is your body's way of getting rid of what caused an upset stomach.  Viruses, food poisoning, and many medicines can cause diarrhea. Some people get diarrhea in response to emotional stress, anxiety, or certain foods. Almost everyone has diarrhea now and then. It usually isn't serious, and your stools will return to normal soon. The important thing to do is replace the fluids you have lost, so you can prevent dehydration. The doctor has checked you carefully, but problems can develop later. If you notice any problems or new symptoms, get medical treatment right away. Follow-up care is a key part of your treatment and safety. Be sure to make and go to all appointments, and call your doctor if you are having problems. It's also a good idea to know your test results and keep a list of the medicines you take. How can you care for yourself at home? · Watch for signs of dehydration, which means your body has lost too much water. Dehydration is a serious condition and should be treated right away. Signs of dehydration are:  ¨ Increasing thirst and dry eyes and mouth. ¨ Feeling faint or lightheaded. ¨ Darker urine, and a smaller amount of urine than normal.  · To prevent dehydration, drink plenty of fluids, enough so that your urine is light yellow or clear like water. Choose water and other caffeine-free clear liquids until you feel better. If you have kidney, heart, or liver disease and have to limit fluids, talk with your doctor before you increase the amount of fluids you drink. · Begin eating small amounts of mild foods the next day, if you feel like it. ¨ Try yogurt that has live cultures of Lactobacillus. (Check the label.)  ¨ Avoid spicy foods, fruits, alcohol, and caffeine until 48 hours after all symptoms are gone. ¨ Avoid chewing gum that contains sorbitol. ¨ Avoid dairy products (except for yogurt with Lactobacillus) while you have diarrhea and for 3 days after symptoms are gone. · The doctor may recommend that you take over-the-counter medicine, such as loperamide (Imodium), if you still have diarrhea after 6 hours. Read and follow all instructions on the label. Do not use this medicine if you have bloody diarrhea, a high fever, or other signs of serious illness. Call your doctor if you think you are having a problem with your medicine. When should you call for help? Call 911 anytime you think you may need emergency care. For example, call if:  ? · You passed out (lost consciousness). ? · Your stools are maroon or very bloody. ?Call your doctor now or seek immediate medical care if:  ? · You are dizzy or lightheaded, or you feel like you may faint. ? · Your stools are black and look like tar, or they have streaks of blood. ? · You have new or worse belly pain. ? · You have symptoms of dehydration, such as:  ¨ Dry eyes and a dry mouth. ¨ Passing only a little dark urine. ¨ Feeling thirstier than usual.   ? · You have a new or higher fever. ? Watch closely for changes in your health, and be sure to contact your doctor if:  ? · Your diarrhea is getting worse. ? · You see pus in the diarrhea. ? · You are not getting better after 2 days (48 hours). Where can you learn more? Go to http://arlene-joann.info/. Enter V488 in the search box to learn more about \"Diarrhea: Care Instructions. \"  Current as of: March 20, 2017  Content Version: 11.4  © 0519-4139 [employer]. Care instructions adapted under license by [employer] (which disclaims liability or warranty for this information). If you have questions about a medical condition or this instruction, always ask your healthcare professional. [place] [address] any warranty or liability for your use of this information. Nausea and Vomiting: Care Instructions  Your Care Instructions    When you are nauseated, you may feel weak and sweaty and notice a lot of saliva in your mouth. Nausea often leads to vomiting.  Most of the time you do not need to worry about nausea and vomiting, but they can be signs of other illnesses. Two common causes of nausea and vomiting are stomach flu and food poisoning. Nausea and vomiting from viral stomach flu will usually start to improve within 24 hours. Nausea and vomiting from food poisoning may last from 12 to 48 hours. The doctor has checked you carefully, but problems can develop later. If you notice any problems or new symptoms, get medical treatment right away. Follow-up care is a key part of your treatment and safety. Be sure to make and go to all appointments, and call your doctor if you are having problems. It's also a good idea to know your test results and keep a list of the medicines you take. How can you care for yourself at home? · To prevent dehydration, drink plenty of fluids, enough so that your urine is light yellow or clear like water. Choose water and other caffeine-free clear liquids until you feel better. If you have kidney, heart, or liver disease and have to limit fluids, talk with your doctor before you increase the amount of fluids you drink. · Rest in bed until you feel better. · When you are able to eat, try clear soups, mild foods, and liquids until all symptoms are gone for 12 to 48 hours. Other good choices include dry toast, crackers, cooked cereal, and gelatin dessert, such as Jell-O. When should you call for help? Call 911 anytime you think you may need emergency care. For example, call if:  ? · You passed out (lost consciousness). ?Call your doctor now or seek immediate medical care if:  ? · You have symptoms of dehydration, such as:  ¨ Dry eyes and a dry mouth. ¨ Passing only a little dark urine. ¨ Feeling thirstier than usual.   ? · You have new or worsening belly pain. ? · You have a new or higher fever. ? · You vomit blood or what looks like coffee grounds. ? Watch closely for changes in your health, and be sure to contact your doctor if:  ? · You have ongoing nausea and vomiting. ? · Your vomiting is getting worse.    ? · Your vomiting lasts longer than 2 days. ? · You are not getting better as expected. Where can you learn more? Go to http://arlene-joann.info/. Enter 25 [id number] in the search box to learn more about \"Nausea and Vomiting: Care Instructions. \"  Current as of: March 20, 2017  Content Version: 11.4  © 3897-0590 [employer]. Care instructions adapted under license by [employer] (which disclaims liability or warranty for this information). If you have questions about a medical condition or this instruction, always ask your healthcare professional. [place] [address] any warranty or liability for your use of this information.

## 2018-05-06 NOTE — ED PROVIDER NOTES
HPI Comments: 68-year-old white female with morbid obesity presents to the emergency department with vomiting and diarrhea and abdominal cramping. Patient reports she's had multiple episodes of intermittent abdominal pain over the years. He was attributed to stomach ulcers. She was preparing to possibly have gastric bypass. Only previous abdominal surgery was . Patient states her to a half-year-old daughter is sick with vomiting and diarrhea. The patient started to have vomiting and diarrhea today. She reports midabdominal and upper abdominal cramping. The cramping is mild in severity. The cramping does not radiate. She reports multiple episodes of diarrhea today. She also reports multiple episodes of vomiting today. No fevers. No dysuria or hematuria. Symptoms are similar to what her young daughter has. Patient denies tobacco or alcohol use. The history is provided by the patient. Past Medical History:   Diagnosis Date    Asthma     GERD (gastroesophageal reflux disease)     Hypothyroid     Second hand smoke exposure        Past Surgical History:   Procedure Laterality Date    HX  SECTION           Family History:   Problem Relation Age of Onset   Chinedu Parisian Arthritis-rheumatoid Mother     COPD Mother     Diabetes Father     Stroke Father     Hypertension Father     Cancer Maternal Uncle     Diabetes Paternal Grandmother     Breast Cancer Neg Hx     Ovarian Cancer Neg Hx     Uterine Cancer Neg Hx        Social History     Social History    Marital status: SINGLE     Spouse name: N/A    Number of children: N/A    Years of education: N/A     Occupational History    Not on file.      Social History Main Topics    Smoking status: Former Smoker     Packs/day: 1.00     Years: 15.00     Quit date: 2014    Smokeless tobacco: Never Used    Alcohol use No    Drug use: No    Sexual activity: No     Other Topics Concern    Not on file     Social History Narrative         ALLERGIES: Percocet [oxycodone-acetaminophen]    Review of Systems   Constitutional: Negative for fever. HENT: Negative for congestion. Eyes: Negative for pain. Respiratory: Negative for shortness of breath. Cardiovascular: Negative for chest pain and leg swelling. Gastrointestinal: Positive for abdominal pain, diarrhea, nausea and vomiting. Endocrine: Negative for polyuria. Genitourinary: Negative for difficulty urinating. Musculoskeletal: Negative for back pain. Skin: Negative for color change. Allergic/Immunologic: Negative for immunocompromised state. Neurological: Negative for headaches. Hematological: Does not bruise/bleed easily. Psychiatric/Behavioral: Negative for confusion. All other systems reviewed and are negative. Vitals:    05/06/18 1751   BP: (!) 131/98   Pulse: 87   Resp: 16   Temp: 98.1 °F (36.7 °C)   SpO2: 96%   Weight: (!) 171.5 kg (378 lb)   Height: 5' 8\" (1.727 m)            Physical Exam   Constitutional: She is oriented to person, place, and time. She appears well-developed and well-nourished. HENT:   Head: Normocephalic and atraumatic. Right Ear: External ear normal.   Left Ear: External ear normal.   Nose: Nose normal.   Mouth/Throat: Oropharynx is clear and moist.   Eyes: EOM are normal. Pupils are equal, round, and reactive to light. No scleral icterus. Neck: Normal range of motion. Neck supple. No JVD present. No tracheal deviation present. No thyromegaly present. Cardiovascular: Normal rate, regular rhythm, normal heart sounds and intact distal pulses. Exam reveals no friction rub. No murmur heard. Pulmonary/Chest: Effort normal and breath sounds normal. No stridor. No respiratory distress. She has no wheezes. She has no rales. She exhibits no tenderness. Abdominal: Soft. Bowel sounds are normal. She exhibits no distension. There is tenderness. There is no rebound and no guarding.    Mild upper abd pain, obese, no lower abd pain   Musculoskeletal: Normal range of motion. She exhibits no edema, tenderness or deformity. Lymphadenopathy:     She has no cervical adenopathy. Neurological: She is alert and oriented to person, place, and time. She has normal reflexes. No cranial nerve deficit. She exhibits normal muscle tone. Coordination normal.   Skin: Skin is warm and dry. No rash noted. No erythema. Psychiatric: She has a normal mood and affect. Her behavior is normal. Judgment and thought content normal.   Nursing note and vitals reviewed. MDM  Number of Diagnoses or Management Options  Diagnosis management comments: Patient has a fairly soft nontender abdomen. She had an abdominal ultrasound done several months ago as well as an abdominal CT scan done several months ago. Both were negative. We'll check basic blood work. We'll check urinalysis. We'll give IV fluids and medications. We'll reassess shortly. Amount and/or Complexity of Data Reviewed  Clinical lab tests: ordered and reviewed  Tests in the radiology section of CPT®: reviewed  Tests in the medicine section of CPT®: ordered and reviewed  Decide to obtain previous medical records or to obtain history from someone other than the patient: yes  Review and summarize past medical records: yes (Previous ED notes, previous US and CT abd)  Independent visualization of images, tracings, or specimens: yes    Risk of Complications, Morbidity, and/or Mortality  Presenting problems: high  Diagnostic procedures: high  Management options: high          ED Course       Procedures    UA is clear    HCG is negative    WBC is unchanged from previous (12.7)    IVF are running, Bentyl given, GI cocktail given now    6:46 PM  Labs are WNL    Home with Zofran, Imodium, Bentyl prn    Good return precautions given to patient. Close follow up with PCP recommended. Patient and/or family voices understanding of this plan. Discharge instructions were explained by me and all concerns were addressed.

## 2018-05-06 NOTE — ED NOTES
IV access established and blood samples obtained and sent for ordered testing. Patient tolerated well. Patient ambulatory to restroom to obtain urine sample. Gait steady. Patient medicated with PO bentyl and IV zofran. IV fluids infusing via gravity without difficulty as ordered. Patient tolerating well. Patient updated regarding plan of care and associated time constraints. Patient verbalizes understanding and agreement. Call bell in reach. Will continue to monitor.

## 2018-05-06 NOTE — ED TRIAGE NOTES
Patient presents ambulatory to treatment area with a steady gait. Patient complains of mid abdominal pain, nausea, vomiting, and diarrhea since yesterday. Patient states that her daughter had a \"norovirus bug\" Thursday and Friday of last week. Patient reports that she is unable to hold down food or fluids.

## 2018-05-08 LAB
BACTERIA SPEC CULT: ABNORMAL
CC UR VC: ABNORMAL
SERVICE CMNT-IMP: ABNORMAL

## 2018-05-15 ENCOUNTER — OFFICE VISIT (OUTPATIENT)
Dept: SURGERY | Age: 38
End: 2018-05-15

## 2018-05-15 VITALS
WEIGHT: 293 LBS | HEART RATE: 84 BPM | HEIGHT: 68 IN | BODY MASS INDEX: 44.41 KG/M2 | SYSTOLIC BLOOD PRESSURE: 117 MMHG | RESPIRATION RATE: 15 BRPM | DIASTOLIC BLOOD PRESSURE: 67 MMHG | OXYGEN SATURATION: 97 % | TEMPERATURE: 98.4 F

## 2018-05-15 DIAGNOSIS — K21.9 GASTROESOPHAGEAL REFLUX DISEASE WITHOUT ESOPHAGITIS: ICD-10-CM

## 2018-05-15 PROBLEM — E66.01 OBESITY, MORBID (HCC): Status: ACTIVE | Noted: 2018-05-15

## 2018-05-15 NOTE — PROGRESS NOTES
Bariatric Surgery Consultation    Subjective: The patient is a 42 y.o. obese  female with a Body mass index is 57.02 kg/(m^2). She has been through the weight loss surgery process 18 months ago but, surgery was delayed due to discovery of severe hypothyroidism. She is adequately treated now and is ready to move forward with surgery. She has lost 25 pounds since her last visit. .  The patient desires laparoscopic gastric bypass surgery for surgical weight loss. The patients goal weight is 200lb. The highest acceptable weight is 220lb  These goals are consistent with expected outcomes of their desired operation. her Medical goals are to prevent diabetes and hypertension. her qualty of life goals are to have more energy and self esteem.     Patient Active Problem List    Diagnosis Date Noted    Obesity, morbid ([facility] [address] 05/15/2018    Normal labor 2015    Condyloma acuminata of vulva during pregnancy 2015    Papanicolaou smear of cervix with atypical squamous cells of undetermined significance (ASC-US) 2015    Cervical high risk human papillomavirus (HPV) DNA test positive 2015    Rubella non-immune status, antepartum 2015    Obesity in pregnancy with antepartum complication     Supervision of other high-risk pregnancy(V23.89) 2015      Past Surgical History:   Procedure Laterality Date    HX  SECTION        Social History   Substance Use Topics    Smoking status: Former Smoker     Packs/day: 1.00     Years: 15.00     Quit date: 2014    Smokeless tobacco: Never Used    Alcohol use No      Family History   Problem Relation Age of Onset   Munira Suresh Arthritis-rheumatoid Mother     COPD Mother     Diabetes Father     Stroke Father     Hypertension Father     Cancer Maternal Uncle     Diabetes Paternal Grandmother     Breast Cancer Neg Hx     Ovarian Cancer Neg Hx     Uterine Cancer Neg Hx       Prior to Admission medications Medication Sig Start Date End Date Taking? Authorizing Provider   loperamide (IMODIUM A-D) 2 mg tablet Take 1 Tab by mouth four (4) times daily as needed for Diarrhea. 5/6/18  Yes Carrol Reyes MD   levothyroxine (SYNTHROID) 125 mcg tablet Take  by mouth Daily (before breakfast). Yes Masha Oliveira MD   famotidine (PEPCID) 20 mg tablet Take 20 mg by mouth two (2) times a day. Yes Masha Oliveira MD   ergocalciferol (ERGOCALCIFEROL) 50,000 unit capsule  1/4/17  Yes Historical Provider   calcium carbonate-vitamin D3 600 mg(1,500mg) -800 unit tab TAKE 1 TABLET BY MOUTH TWICE A DAY 12/16/16  Yes Historical Provider   PROAIR HFA 90 mcg/actuation inhaler Take 2 Puffs by inhalation as needed. Every 4 to 6 hours as needed 3/4/16  Yes Historical Provider   ondansetron (ZOFRAN ODT) 4 mg disintegrating tablet Take 1 Tab by mouth every eight (8) hours as needed for Nausea. 5/6/18   Carrol Reyes MD   fluticasone-salmeterol (ADVAIR DISKUS) 250-50 mcg/dose diskus inhaler Take 1 Puff by inhalation every twelve (12) hours. Masha Oliveira MD     Allergies   Allergen Reactions    Percocet [Oxycodone-Acetaminophen] Nausea and Vomiting         Review of Systems:    A comprehensive review of systems was negative except for that written in the HPI. Objective:     Visit Vitals    /67 (BP 1 Location: Left arm, BP Patient Position: Sitting)    Pulse 84    Temp 98.4 °F (36.9 °C) (Oral)    Resp 15    Ht 5' 8\" (1.727 m)    Wt (!) 375 lb (170.1 kg)    LMP 04/23/2018    SpO2 97%    BMI 57.02 kg/m2       Physical Exam:  General:  Alert, cooperative, no distress, appears stated age. Eyes:  Conjunctivae/corneas clear. , EOMs intact. Lungs:   Clear to auscultation bilaterally. Heart:  Regular rate and rhythm, S1, S2 normal, no murmur, click, rub or gallop. Abdomen:   Soft, non-tender. Bowel sounds normal. No masses,  No organomegaly. Lymph nodes: Cervical, supraclavicular,  normal.   Neurologic: AOX3.  Normal strength, sensation  throughout. Assessment:     Morbid obesity with comorbidity  Plan:     laparoscopic gastric bypass surgery    This is a 42 y.o. female with a BMI of Body mass index is 57.02 kg/(m^2). and the weight-related co-morbidties of GERD. Salomon Underwood meets the NIH criteria for bariatric surgery based upon the BMI of Body mass index is 57.02 kg/(m^2). and multiple weight-related co-morbidties. Salomon Underwood has elected laparoscopic cesar-en-Y gastric bypass as her intervention of choice for treatment of morbid obestiy through surgical means secondary to its long term history of success. In the office today, following Gigi's history and physical examination, a 30 minute discussion regarding the anatomic alterations for the laparoscopic cesar-en-Y gastric bypass was undertaken. The dietary expectations and the patient and physician dependent factors for success were thoroughly discussed, to include the need for interval follow-up and long-term dietary changes associated with success. The possible complications of the cesar-en-Y gastric bypass  were also discussed, to include; staple line leak, bleeding, stricture, infection, internal hernia and pouch dilation. Specific weight related outcomes for success were also discussed with an emphasis on careful and close follow-up with the first year. The patient expressed an understanding of the above factors, and her questions were answered in their entirety. In addition, the patient attended a 1.5 hour power point seminar regarding obesity, surgical weight loss including, adjustable gastric band, gastric bypass, and sleeve gastrectomy. This discussion contrasted the different surgical techniques, mechanisms of actions and expected outcomes, and surgical and medical risks associated with each procedure. During this seminar, there was a long question and answer session where each questions was answered until there were no additional questions.      Today, the patient had all of her questions answered and desires to proceed with pre-qualification for bariatric surgery initially choosing cesar-en-Y gastric bypass as her surgical option.     Signed By: Mine Hodges MD     May 15, 2018

## 2018-05-15 NOTE — PROGRESS NOTES
1. Have you been to the ER, urgent care clinic since your last visit? Hospitalized since your last visit?no    2. Have you seen or consulted any other health care providers outside of the [address] [place] [address] since your last visit? Include any pap smears or colon screening.  no

## 2018-06-07 ENCOUNTER — CLINICAL SUPPORT (OUTPATIENT)
Dept: SURGERY | Age: 38
End: 2018-06-07

## 2018-06-07 DIAGNOSIS — E66.01 MORBID OBESITY WITH BMI OF 50.0-59.9, ADULT (HCC): Primary | ICD-10-CM

## 2018-06-08 VITALS — WEIGHT: 293 LBS | BODY MASS INDEX: 55.65 KG/M2

## 2018-06-08 NOTE — PROGRESS NOTES
Pre-operative Bariatric Nutrition Evaluation ()     Date: 2018   Arvin Rangel M.D. Name: Sintia Peck  :  1980  Age:  42  Gender: Female   Type of Surgery: [x]           Gastric Bypass  []           LAGB  [x]           Sleeve Gastrectomy    ASSESSMENT:    Past Medical History:GERD, arthritis, asthma     Medications/Supplements:   Prior to Admission medications    Medication Sig Start Date End Date Taking? Authorizing Provider   ondansetron (ZOFRAN ODT) 4 mg disintegrating tablet Take 1 Tab by mouth every eight (8) hours as needed for Nausea. 18   Ana Bo MD   loperamide (IMODIUM A-D) 2 mg tablet Take 1 Tab by mouth four (4) times daily as needed for Diarrhea. 18   Ana Bo MD   levothyroxine (SYNTHROID) 125 mcg tablet Take  by mouth Daily (before breakfast). Masha Oliveira MD   famotidine (PEPCID) 20 mg tablet Take 20 mg by mouth two (2) times a day. Masha Oliveira MD   fluticasone-salmeterol (ADVAIR DISKUS) 250-50 mcg/dose diskus inhaler Take 1 Puff by inhalation every twelve (12) hours. Masha Oliveira MD   ergocalciferol (ERGOCALCIFEROL) 50,000 unit capsule  17   Historical Provider   calcium carbonate-vitamin D3 600 mg(1,500mg) -800 unit tab TAKE 1 TABLET BY MOUTH TWICE A DAY 16   Historical Provider   PROAIR HFA 90 mcg/actuation inhaler Take 2 Puffs by inhalation as needed. Every 4 to 6 hours as needed 3/4/16   Historical Provider       Food Allergies/Intolerances:none    Anthropometrics:    Ht:68\"   Wt: 366#    IBW: 140#    %IBW: 261%     BMI:55    Category: obesity III     Reported wt history:Pt presents today for pre-op nutrition evaluation for wt loss surgery. Reports lowest adult BW was 180# at age 23. Highest adult BW was 419# within the past year. Attributes wt gain over the years r/t pregnancy, emotional eating, physical inactivity and work schedule. Has attempted wt loss through various methods over the years.  Most significant wt loss was 40# wt loss with Atkins diet. Has been unable to achieve more long term or more significant wt loss and is now seeking approval for weight loss surgery. Has been through approval process a few years ago. Pt will now need to complete 6 months supervised weight loss for insurance requirements. Exercise/Physical Activity:walking for 30 minutes, 3 times per week    Reported Diet History:Atkins, Weight Watchers,diet pills, reduced carbohydrate     24 Hour Diet Recall  Breakfast  2 eggs, 2 field, 1 whole tomato   Lunch  1 can tuna fish, half cucumbers, 1 whole tomatoes    Dinner  Protein, veggies    Snacks  Nuts, olives, cheese, fruit    Beverages  Water      A pre-op nutrition checklist was reviewed. Patient checked off 5 of 15 items. Environment/Psychosocial/Support:pt reports father, aunt, brother and cousin as support system. Rates support level 8 out of 10. Pt has a 2 yo daughter present during today's appointment. Pt is currently not working and is in school part time. NUTRITION DIAGNOSIS:  1. Self-monitoring deficit r/t previous lack of value for this change evidenced by pt skips meals. 2. Food and nutrition related knowledge deficit r/t previous lack of exposure to information evidenced by pt seeking nutrition education in preparation for wt loss surgery. NUTRITION INTERVENTION:  Pt educated on nutrition recommendations for weight loss surgery, specifically gastric bypass vs sleeve gastrectomy. Instructed on consuming 3 meals per day starting now. Use the balanced plate method to plan meals, include 3 oz of lean source of protein, 1/2 cup whole grains, unlimited non-starchy vegetables, 1/2 cup fruit and 1 serving of low fat dairy. Utilize handouts listing healthy snack and meal ideas to limit restaurant meals. After surgery measure all meals to 1/2 cup.  Each meal will contain a 1/4 cup lean protein and 1/4 cup fruit, non-starchy vegetable or starch (limiting to once per day). Aim for 60 g protein per day. Sip on 48-64 oz of sugar free, calorie free, non-carbonated beverages each day. Do not use a straw. Do not consume beverages 30 minutes before, during or 30 minutes after meals. Read all nutrition labels. Demonstrated and emphasized identifying serving size, total fat, sugar and protein content. Defined low fat as </= 3 g per serving. Discussed lean and extra lean sources of protein. Provided list of low fat cooking methods. Avoid foods with sugar listed in the first 3 ingredients and >/15 g sugar per serving. Excess sugar/fat intake may lead to dumping syndrome. Discussed signs and symptoms of dumping syndrome. Practice mindful eating habits; take small bites, chew thoroughly, avoid distractions, utilize hunger/fullness scale. Consume meals over 20-30 minutes. Attend Bariatric Support Group and increase physical activity (approved per MD) for long term weight maintenance. NUTRITION MONITORING AND EVALUATION:    The following goals were established with patient;  1. Eat 3 meals a day. Do not skip meals. 2. Use balanced plate method for help with meal planning, portion control and balanced meals at this time. 3. Maintain exercise and increase as tolerated. 4. Follow up with RD next month for supervised weight loss. Specific tips and techniques to facilitate compliance with above recommendations were provided and discussed. Pt was strongly encourage to begin making necessary changes now, attend support group, and re-visit the dietitian prn. Nutrition evaluation reveals lifestyle changes are indicated to better prepare for weight loss surgery. Goals set and recommendations made. Will continue to assess as pt works to complete supervised weight loss requirements. If further details are desired please feel free to contact me at [phone]. This phone number was also provided to the patient for any further questions or concerns.            [facility] Destini Clemente, [address]

## 2018-07-10 ENCOUNTER — CLINICAL SUPPORT (OUTPATIENT)
Dept: SURGERY | Age: 38
End: 2018-07-10

## 2018-07-10 VITALS — WEIGHT: 293 LBS | BODY MASS INDEX: 55.65 KG/M2

## 2018-07-10 DIAGNOSIS — E66.01 MORBID OBESITY WITH BMI OF 50.0-59.9, ADULT (HCC): Primary | ICD-10-CM

## 2018-07-10 NOTE — PROGRESS NOTES
[facility] at [facility]  Supervised Weight Loss     Date:   7/10/2018    Patient's Name: Soraya Doll  : 1980    Insurance:  [employer]          Session: 2 of  6  Surgery: Gastric Bypass  Surgeon:  Ila Valderrama M.D. Height: 68\"  Weight:    366      Lbs. BMI: 55   Pounds Lost since last month: 0               Pounds Gained since last month: 0    Starting Weight: 366#   Previous Months Weight: 366#  Overall Pounds Lost: 0  Overall Pounds Gained: 0    Other Pertinent Information: n/a     Smoking Status:  none  Alcohol Intake: none    I have reviewed with patient the guidelines of the supervised weight loss class. Patient understands the expectations of some weight loss during the weight loss trial.  Patient understands that weight gain could delay the process. I have also expressed to patient that classes need to be consecutive. Missing a class may subject patient to have to start their trial over. Patient has received this information in writing. Changes that patient has made since last month include:  More active, less carbs. Eating Habits and Behaviors  A review of the general nutrition guidelines in preparation for weight loss surgery was provided. A nutrition less was presented specific to vitamins. We discussed current vitamin recommendations and the importance of life-long adherence to a vitamin/mineral regimen after wt loss surgery. Patients were instructed that their plate should be made up 1/2 plate coming from non-starchy vegetables, 1/4 coming from lean meat, and 1/4 of their plate coming from carbohydrates, including fruits, starches, or milk. We discussed measuring meals to 1/2 cup total per meal after surgery. Emphasis was placed on the importance of eating 3 meals a day and aiming for 60 grams of protein per day. I educated the patient on limiting liquid calories and drinking only calorie-free, sugar-free and non-carbonated beverages. We discussed the importance of drinking 64 ounces of fluid per day to prevent dehydration post-operatively. Patient's current diet habits include: eating 2-3 meals per day. Snacking on nuts and strawberries. Avoiding refined carbohydrates, sweets, desserts. Eating a mixture of baked,grilled, broiled and some fried foods. Eating out is never. Drinking 100 oz water, 20 oz of unsweetened and sweetened tea, 20 oz diet soda, 1 cup coffee and 16 oz Crystal Light. Sometimes emotional eating and takes a walk or meditation for non-food coping strategies. Is not packing meals when away from home. Eating most meals at a table or while watching television and takes 15-20 minutes to finish the meal. Reports night eating and lack of activity are biggest barriers to weight loss. Physical Activity/Exercise  We talked about the importance of increasing daily physical activity and beginning to develop an exercise regimen/routine. We discussed that exercise is an important part of long term weight loss after surgery. Comments:  During class, I discussed with patient the importance of getting into an exercise routine. Patient is currently swimming for 30 minutes, 2 times per week for activity. Patient has been encouraged to maintain and increase as tolerated to 150 minutes per week. Behavior Modification       Comments: We discussed the importance of eating mindfully after weight loss surgery to prevent food intolerance and prevent weight regain. We talked about how to eat more mindfully and identify emotional eating triggers. Tips and recommendations for how to make these changes were provided. Patient was encouraged to keep a food journal and record what they were taking in daily. Overall Assessment: Patient demonstrates some small changes this past month evidenced by reported changes and weight maintenance. Will continue to assess. Patient-Set Goals:   1.  Nutrition - decrease caffeine and fat content   2. Exercise - increase exercise   3.  Behavior -try not to stress out     Lenora Taylor, RD  7/10/2018

## 2018-07-21 ENCOUNTER — HOSPITAL ENCOUNTER (EMERGENCY)
Age: 38
Discharge: HOME | End: 2018-07-21
Attending: EMERGENCY MEDICINE
Payer: MEDICAID

## 2018-07-21 VITALS
HEART RATE: 79 BPM | WEIGHT: 293 LBS | DIASTOLIC BLOOD PRESSURE: 60 MMHG | RESPIRATION RATE: 18 BRPM | TEMPERATURE: 97.9 F | SYSTOLIC BLOOD PRESSURE: 103 MMHG | BODY MASS INDEX: 44.41 KG/M2 | HEIGHT: 68 IN | OXYGEN SATURATION: 98 %

## 2018-07-21 DIAGNOSIS — R10.12 ABDOMINAL PAIN, LUQ (LEFT UPPER QUADRANT): Primary | ICD-10-CM

## 2018-07-21 LAB
ALBUMIN SERPL-MCNC: 3.2 G/DL (ref 3.5–5)
ALBUMIN/GLOB SERPL: 0.7 {RATIO} (ref 1.1–2.2)
ALP SERPL-CCNC: 82 U/L (ref 45–117)
ALT SERPL-CCNC: 17 U/L (ref 12–78)
AMORPH CRY URNS QL MICRO: ABNORMAL
ANION GAP SERPL CALC-SCNC: 10 MMOL/L (ref 5–15)
APPEARANCE UR: ABNORMAL
AST SERPL-CCNC: 14 U/L (ref 15–37)
BACTERIA URNS QL MICRO: NEGATIVE /HPF
BASOPHILS # BLD: 0 K/UL (ref 0–0.1)
BASOPHILS NFR BLD: 0 % (ref 0–1)
BILIRUB SERPL-MCNC: 0.2 MG/DL (ref 0.2–1)
BILIRUB UR QL: NEGATIVE
BUN SERPL-MCNC: 15 MG/DL (ref 6–20)
BUN/CREAT SERPL: 19 (ref 12–20)
CALCIUM SERPL-MCNC: 8.9 MG/DL (ref 8.5–10.1)
CHLORIDE SERPL-SCNC: 106 MMOL/L (ref 97–108)
CO2 SERPL-SCNC: 25 MMOL/L (ref 21–32)
COLOR UR: ABNORMAL
CREAT SERPL-MCNC: 0.81 MG/DL (ref 0.55–1.02)
DIFFERENTIAL METHOD BLD: ABNORMAL
EOSINOPHIL # BLD: 0.4 K/UL (ref 0–0.4)
EOSINOPHIL NFR BLD: 4 % (ref 0–7)
EPITH CASTS URNS QL MICRO: ABNORMAL /LPF
ERYTHROCYTE [DISTWIDTH] IN BLOOD BY AUTOMATED COUNT: 14.6 % (ref 11.5–14.5)
GLOBULIN SER CALC-MCNC: 4.3 G/DL (ref 2–4)
GLUCOSE SERPL-MCNC: 90 MG/DL (ref 65–100)
GLUCOSE UR STRIP.AUTO-MCNC: NEGATIVE MG/DL
HCG UR QL: NEGATIVE
HCT VFR BLD AUTO: 38.6 % (ref 35–47)
HGB BLD-MCNC: 12.6 G/DL (ref 11.5–16)
HGB UR QL STRIP: NEGATIVE
KETONES UR QL STRIP.AUTO: NEGATIVE MG/DL
LEUKOCYTE ESTERASE UR QL STRIP.AUTO: NEGATIVE
LIPASE SERPL-CCNC: 154 U/L (ref 73–393)
LYMPHOCYTES # BLD: 2 K/UL (ref 0.8–3.5)
LYMPHOCYTES NFR BLD: 20 % (ref 12–49)
MCH RBC QN AUTO: 28.5 PG (ref 26–34)
MCHC RBC AUTO-ENTMCNC: 32.6 G/DL (ref 30–36.5)
MCV RBC AUTO: 87.3 FL (ref 80–99)
MONOCYTES # BLD: 0.7 K/UL (ref 0–1)
MONOCYTES NFR BLD: 7 % (ref 5–13)
MUCOUS THREADS URNS QL MICRO: ABNORMAL /LPF
NEUTS SEG # BLD: 6.9 K/UL (ref 1.8–8)
NEUTS SEG NFR BLD: 69 % (ref 32–75)
NITRITE UR QL STRIP.AUTO: NEGATIVE
PH UR STRIP: 6 [PH] (ref 5–8)
PLATELET # BLD AUTO: 416 K/UL (ref 150–400)
PMV BLD AUTO: 9 FL (ref 8.9–12.9)
POTASSIUM SERPL-SCNC: 3.7 MMOL/L (ref 3.5–5.1)
PROT SERPL-MCNC: 7.5 G/DL (ref 6.4–8.2)
PROT UR STRIP-MCNC: NEGATIVE MG/DL
RBC # BLD AUTO: 4.42 M/UL (ref 3.8–5.2)
RBC #/AREA URNS HPF: ABNORMAL /HPF (ref 0–5)
SODIUM SERPL-SCNC: 141 MMOL/L (ref 136–145)
SP GR UR REFRACTOMETRY: 1.03 (ref 1–1.03)
UR CULT HOLD, URHOLD: NORMAL
UROBILINOGEN UR QL STRIP.AUTO: 0.2 EU/DL (ref 0.2–1)
WBC # BLD AUTO: 9.9 K/UL (ref 3.6–11)
WBC URNS QL MICRO: ABNORMAL /HPF (ref 0–4)
XXWBCSUS: 0

## 2018-07-21 PROCEDURE — 74011000250 HC RX REV CODE- 250: Performed by: NURSE PRACTITIONER

## 2018-07-21 PROCEDURE — 85025 COMPLETE CBC W/AUTO DIFF WBC: CPT | Performed by: NURSE PRACTITIONER

## 2018-07-21 PROCEDURE — 81001 URINALYSIS AUTO W/SCOPE: CPT | Performed by: NURSE PRACTITIONER

## 2018-07-21 PROCEDURE — 74011250636 HC RX REV CODE- 250/636: Performed by: NURSE PRACTITIONER

## 2018-07-21 PROCEDURE — 83690 ASSAY OF LIPASE: CPT | Performed by: NURSE PRACTITIONER

## 2018-07-21 PROCEDURE — 96375 TX/PRO/DX INJ NEW DRUG ADDON: CPT

## 2018-07-21 PROCEDURE — 74011250637 HC RX REV CODE- 250/637: Performed by: NURSE PRACTITIONER

## 2018-07-21 PROCEDURE — 96361 HYDRATE IV INFUSION ADD-ON: CPT

## 2018-07-21 PROCEDURE — 99284 EMERGENCY DEPT VISIT MOD MDM: CPT

## 2018-07-21 PROCEDURE — 96372 THER/PROPH/DIAG INJ SC/IM: CPT

## 2018-07-21 PROCEDURE — 80053 COMPREHEN METABOLIC PANEL: CPT | Performed by: NURSE PRACTITIONER

## 2018-07-21 PROCEDURE — 36415 COLL VENOUS BLD VENIPUNCTURE: CPT | Performed by: NURSE PRACTITIONER

## 2018-07-21 PROCEDURE — 81025 URINE PREGNANCY TEST: CPT

## 2018-07-21 PROCEDURE — 96374 THER/PROPH/DIAG INJ IV PUSH: CPT

## 2018-07-21 RX ORDER — ONDANSETRON 2 MG/ML
4 INJECTION INTRAMUSCULAR; INTRAVENOUS
Status: COMPLETED | OUTPATIENT
Start: 2018-07-21 | End: 2018-07-21

## 2018-07-21 RX ORDER — FAMOTIDINE 10 MG/ML
20 INJECTION INTRAVENOUS
Status: COMPLETED | OUTPATIENT
Start: 2018-07-21 | End: 2018-07-21

## 2018-07-21 RX ORDER — ONDANSETRON 4 MG/1
4 TABLET, ORALLY DISINTEGRATING ORAL
Qty: 20 TAB | Refills: 0 | Status: SHIPPED | OUTPATIENT
Start: 2018-07-21 | End: 2019-02-26 | Stop reason: SDUPTHER

## 2018-07-21 RX ORDER — ACETAMINOPHEN 500 MG
500 TABLET ORAL
COMMUNITY

## 2018-07-21 RX ORDER — ETONOGESTREL 68 MG/1
IMPLANT SUBCUTANEOUS
COMMUNITY

## 2018-07-21 RX ORDER — DICYCLOMINE HYDROCHLORIDE 10 MG/ML
20 INJECTION INTRAMUSCULAR
Status: DISCONTINUED | OUTPATIENT
Start: 2018-07-21 | End: 2018-07-21

## 2018-07-21 RX ORDER — ONDANSETRON 4 MG/1
4 TABLET, ORALLY DISINTEGRATING ORAL
Qty: 10 TAB | Refills: 0 | Status: SHIPPED | OUTPATIENT
Start: 2018-07-21 | End: 2019-02-26

## 2018-07-21 RX ORDER — DICYCLOMINE HYDROCHLORIDE 10 MG/ML
10 INJECTION INTRAMUSCULAR
Status: COMPLETED | OUTPATIENT
Start: 2018-07-21 | End: 2018-07-21

## 2018-07-21 RX ORDER — DICYCLOMINE HYDROCHLORIDE 10 MG/1
20 CAPSULE ORAL 4 TIMES DAILY
Qty: 20 CAP | Refills: 0 | Status: SHIPPED | OUTPATIENT
Start: 2018-07-21 | End: 2019-05-28

## 2018-07-21 RX ADMIN — DICYCLOMINE HYDROCHLORIDE 10 MG: 10 INJECTION INTRAMUSCULAR at 19:53

## 2018-07-21 RX ADMIN — SODIUM CHLORIDE 1000 ML: 900 INJECTION, SOLUTION INTRAVENOUS at 20:06

## 2018-07-21 RX ADMIN — ONDANSETRON 4 MG: 2 INJECTION, SOLUTION INTRAMUSCULAR; INTRAVENOUS at 21:09

## 2018-07-21 RX ADMIN — FAMOTIDINE 20 MG: 10 INJECTION, SOLUTION INTRAVENOUS at 20:06

## 2018-07-21 RX ADMIN — LIDOCAINE HYDROCHLORIDE 40 ML: 20 SOLUTION ORAL; TOPICAL at 20:05

## 2018-07-21 NOTE — ED TRIAGE NOTES
\"the same pain I've been having past 5 years. \" endoscopy scheduled next month. Left upper abdominal pain. Last endoscopy was a year ago and showed hiatal hernia and \"pre ulcers. \" pain started again last night. Pt has been trying Atkins diet in preparation for gastric bypass. + nausea, no emesis. Last BM this am, no diarrhea.

## 2018-07-21 NOTE — DISCHARGE INSTRUCTIONS
Abdominal Pain: Care Instructions  Your Care Instructions    Abdominal pain has many possible causes. Some aren't serious and get better on their own in a few days. Others need more testing and treatment. If your pain continues or gets worse, you need to be rechecked and may need more tests to find out what is wrong. You may need surgery to correct the problem. Don't ignore new symptoms, such as fever, nausea and vomiting, urination problems, pain that gets worse, and dizziness. These may be signs of a more serious problem. Your doctor may have recommended a follow-up visit in the next 8 to 12 hours. If you are not getting better, you may need more tests or treatment. The doctor has checked you carefully, but problems can develop later. If you notice any problems or new symptoms, get medical treatment right away. Follow-up care is a key part of your treatment and safety. Be sure to make and go to all appointments, and call your doctor if you are having problems. It's also a good idea to know your test results and keep a list of the medicines you take. How can you care for yourself at home? · Rest until you feel better. · To prevent dehydration, drink plenty of fluids, enough so that your urine is light yellow or clear like water. Choose water and other caffeine-free clear liquids until you feel better. If you have kidney, heart, or liver disease and have to limit fluids, talk with your doctor before you increase the amount of fluids you drink. · If your stomach is upset, eat mild foods, such as rice, dry toast or crackers, bananas, and applesauce. Try eating several small meals instead of two or three large ones. · Wait until 48 hours after all symptoms have gone away before you have spicy foods, alcohol, and drinks that contain caffeine. · Do not eat foods that are high in fat. · Avoid anti-inflammatory medicines such as aspirin, ibuprofen (Advil, Motrin), and naproxen (Aleve).  These can cause stomach upset. Talk to your doctor if you take daily aspirin for another health problem. When should you call for help? Call 911 anytime you think you may need emergency care. For example, call if:    · You passed out (lost consciousness).     · You pass maroon or very bloody stools.     · You vomit blood or what looks like coffee grounds.     · You have new, severe belly pain.    Call your doctor now or seek immediate medical care if:    · Your pain gets worse, especially if it becomes focused in one area of your belly.     · You have a new or higher fever.     · Your stools are black and look like tar, or they have streaks of blood.     · You have unexpected vaginal bleeding.     · You have symptoms of a urinary tract infection. These may include:  ¨ Pain when you urinate. ¨ Urinating more often than usual.  ¨ Blood in your urine.     · You are dizzy or lightheaded, or you feel like you may faint.    Watch closely for changes in your health, and be sure to contact your doctor if:    · You are not getting better after 1 day (24 hours). Where can you learn more? Go to http://[employer].info/. Enter T727 in the search box to learn more about \"Abdominal Pain: Care Instructions. \"  Current as of: November 20, 2017  Content Version: 11.7  © 9522-1337 [employer]. Care instructions adapted under license by [employer] (which disclaims liability or warranty for this information). If you have questions about a medical condition or this instruction, always ask your healthcare professional. [place] [address] any warranty or liability for your use of this information. We hope that we have addressed all of your medical concerns. The examination and treatment you received in the Emergency Department were for an emergent problem and were not intended as complete care. It is important that you follow up with your healthcare provider(s) for ongoing care. If your symptoms worsen or do not improve as expected, and you are unable to reach your usual health care provider(s), you should return to the Emergency Department. Today's healthcare is undergoing tremendous change, and patient satisfaction surveys are one of the many tools to assess the quality of medical care. You may receive a survey from the [employer] organization regarding your experience in the Emergency Department. I hope that your experience has been completely positive, particularly the medical care that I provided. As such, please participate in the survey; anything less than excellent does not meet my expectations or intentions. [facility] and [facility] participate in nationally recognized quality of care measures. If your blood pressure is greater than 120/80, as reported below, we urge that you seek medical care to address the potential of high blood pressure, commonly known as hypertension. Hypertension can be hereditary or can be caused by certain medical conditions, pain, stress, or \"white coat syndrome. \"       Please make an appointment with your health care provider(s) for follow up of your Emergency Department visit. VITALS:   Patient Vitals for the past 8 hrs:   Temp Pulse Resp BP SpO2   07/21/18 2200 - - - 103/60 98 %   07/21/18 2130 - 79 18 110/64 97 %   07/21/18 2100 - 76 18 114/54 98 %   07/21/18 2030 - 78 20 123/65 98 %   07/21/18 2024 - 79 18 108/63 98 %   07/21/18 2017 - 79 - - 99 %   07/21/18 2016 - 80 18 130/73 -   07/21/18 1900 97.9 °F (36.6 °C) (!) 104 20 131/78 97 %          Thank you for allowing us to provide you with medical care today. We realize that you have many choices for your emergency care needs. Please choose us in the future for any continued health care needs. [facility], [address]   Office: [phone]            Recent Results (from the past 24 hour(s))   CBC WITH AUTOMATED DIFF    Collection Time: 07/21/18  7:47 PM   Result Value Ref Range    WBC 9.9 3.6 - 11.0 K/uL    RBC 4.42 3.80 - 5.20 M/uL    HGB 12.6 11.5 - 16.0 g/dL    HCT 38.6 35.0 - 47.0 %    MCV 87.3 80.0 - 99.0 FL    MCH 28.5 26.0 - 34.0 PG    MCHC 32.6 30.0 - 36.5 g/dL    RDW 14.6 (H) 11.5 - 14.5 %    PLATELET 751 (H) 743 - 400 K/uL    MPV 9.0 8.9 - 12.9 FL    NEUTROPHILS 69 32 - 75 %    LYMPHOCYTES 20 12 - 49 %    MONOCYTES 7 5 - 13 %    EOSINOPHILS 4 0 - 7 %    BASOPHILS 0 0 - 1 %    ABS. NEUTROPHILS 6.9 1.8 - 8.0 K/UL    ABS. LYMPHOCYTES 2.0 0.8 - 3.5 K/UL    ABS. MONOCYTES 0.7 0.0 - 1.0 K/UL    ABS. EOSINOPHILS 0.4 0.0 - 0.4 K/UL    ABS. BASOPHILS 0.0 0.0 - 0.1 K/UL    DF AUTOMATED      XXWBCSUS 0     METABOLIC PANEL, COMPREHENSIVE    Collection Time: 07/21/18  7:47 PM   Result Value Ref Range    Sodium 141 136 - 145 mmol/L    Potassium 3.7 3.5 - 5.1 mmol/L    Chloride 106 97 - 108 mmol/L    CO2 25 21 - 32 mmol/L    Anion gap 10 5 - 15 mmol/L    Glucose 90 65 - 100 mg/dL    BUN 15 6 - 20 MG/DL    Creatinine 0.81 0.55 - 1.02 MG/DL    BUN/Creatinine ratio 19 12 - 20      GFR est AA >60 >60 ml/min/1.73m2    GFR est non-AA >60 >60 ml/min/1.73m2    Calcium 8.9 8.5 - 10.1 MG/DL    Bilirubin, total 0.2 0.2 - 1.0 MG/DL    ALT (SGPT) 17 12 - 78 U/L    AST (SGOT) 14 (L) 15 - 37 U/L    Alk.  phosphatase 82 45 - 117 U/L    Protein, total 7.5 6.4 - 8.2 g/dL    Albumin 3.2 (L) 3.5 - 5.0 g/dL    Globulin 4.3 (H) 2.0 - 4.0 g/dL    A-G Ratio 0.7 (L) 1.1 - 2.2     LIPASE    Collection Time: 07/21/18  7:47 PM   Result Value Ref Range    Lipase 154 73 - 393 U/L   URINALYSIS W/MICROSCOPIC    Collection Time: 07/21/18  9:24 PM   Result Value Ref Range    Color YELLOW/STRAW      Appearance HAZY (A) CLEAR      Specific gravity 1.028 1.003 - 1.030      pH (UA) 6.0 5.0 - 8.0      Protein NEGATIVE  NEG mg/dL    Glucose NEGATIVE  NEG mg/dL    Ketone NEGATIVE  NEG mg/dL    Bilirubin NEGATIVE  NEG      Blood NEGATIVE  NEG      Urobilinogen 0.2 0.2 - 1.0 EU/dL    Nitrites NEGATIVE  NEG      Leukocyte Esterase NEGATIVE  NEG      WBC 0-4 0 - 4 /hpf    RBC 0-5 0 - 5 /hpf    Epithelial cells MODERATE (A) FEW /lpf    Bacteria NEGATIVE  NEG /hpf    Mucus 1+ (A) NEG /lpf    Amorphous Crystals FEW (A) NEG     URINE CULTURE HOLD SAMPLE    Collection Time: 07/21/18  9:24 PM   Result Value Ref Range    Urine culture hold        URINE ON HOLD IN MICROBIOLOGY DEPT FOR 3 DAYS. IF UNPRESERVED URINE IS SUBMITTED, IT CANNOT BE USED FOR ADDITIONAL TESTING AFTER 24 HRS, RECOLLECTION WILL BE REQUIRED. HCG URINE, QL. - POC    Collection Time: 07/21/18  9:25 PM   Result Value Ref Range    Pregnancy test,urine (POC) NEGATIVE  NEG         No results found.

## 2018-07-21 NOTE — ED PROVIDER NOTES
HPI Comments: Patient is a 52-year-old female with a past medical history significant for morbid obesity, GERD, hypothyroid and asthma who is ambulatory to the ED today with complaints of left upper quadrant gastric pain. Patient states the pain started about 1 PM today. She has had these issues off and on for the last 5 years. Patient states she had an endoscopy approximately one year ago that showed a hiatal hernia and \"pre-ulcers\". She had a similar episode of abdominal pain in May. She briefly increased her Pepcid at that time on the advice of her primary care provider. She saw her gastroenterologist last month. He has scheduled her for an upper endoscopy on . Patient notes that her current episode started yesterday when the only meal she ate felt like it \"wouldn't go down \". Patient states her symptoms resolved on their own. At about 1 PM today show some onset of left upper quadrant pain. She took 1500 mg of Tylenol at that time and the pain subsided for approximately one hour. Patient states in addition to the pain she's also had nausea and an increase in belching. She denies any vomiting, fever, chills, diarrhea, chest pain or shortness of breath. Last BM was earlier today. PsHx . The patient has no further complaints at this time. Primary care provider:Cornelio Faye MD      The history is provided by the patient. No  was used.         Past Medical History:   Diagnosis Date    Asthma     GERD (gastroesophageal reflux disease)     Hypothyroid     Second hand smoke exposure        Past Surgical History:   Procedure Laterality Date    HX  SECTION           Family History:   Problem Relation Age of Onset   Gurmeet Tinsley Arthritis-rheumatoid Mother     COPD Mother     Diabetes Father     Stroke Father     Hypertension Father     Cancer Maternal Uncle     Diabetes Paternal Grandmother     Breast Cancer Neg Hx     Ovarian Cancer Neg Hx     Uterine Cancer Neg Hx        Social History     Social History    Marital status: SINGLE     Spouse name: N/A    Number of children: N/A    Years of education: N/A     Occupational History    Not on file. Social History Main Topics    Smoking status: Former Smoker     Packs/day: 1.00     Years: 15.00     Quit date: 11/1/2014    Smokeless tobacco: Never Used    Alcohol use No    Drug use: No    Sexual activity: No     Other Topics Concern    Not on file     Social History Narrative         ALLERGIES: Percocet [oxycodone-acetaminophen]    Review of Systems   Constitutional: Negative for appetite change, chills and fever. HENT: Negative. Respiratory: Negative for cough, shortness of breath and wheezing. Cardiovascular: Negative for chest pain. Gastrointestinal: Positive for abdominal pain and nausea. Negative for constipation, diarrhea and vomiting. Genitourinary: Negative for dysuria and urgency. Musculoskeletal: Negative for back pain. Skin: Negative for color change and rash. Neurological: Negative for dizziness and headaches. Psychiatric/Behavioral: Negative. All other systems reviewed and are negative. Vitals:    07/21/18 1900   BP: 131/78   Pulse: (!) 104   Resp: 20   Temp: 97.9 °F (36.6 °C)   SpO2: 97%   Weight: (!) 167.3 kg (368 lb 13.3 oz)   Height: 5' 8\" (1.727 m)            Physical Exam   Constitutional: She is oriented to person, place, and time. She appears well-developed and well-nourished. Morbidly obese female in NAD   HENT:   Head: Normocephalic and atraumatic. Neck: Normal range of motion. Neck supple. Cardiovascular: Regular rhythm, normal heart sounds and intact distal pulses. Tachycardia present. Pulmonary/Chest: Effort normal and breath sounds normal. No respiratory distress. She has no wheezes. She has no rales. She exhibits no tenderness. Abdominal: Soft. Bowel sounds are normal. She exhibits no distension and no mass.  There is tenderness in the left upper quadrant. There is no rebound, no guarding and no CVA tenderness. Abdomen is soft. Pain only at edge of rib cage on left side. No pelvic tenderness. Musculoskeletal: Normal range of motion. Neurological: She is alert and oriented to person, place, and time. Skin: Skin is warm and dry. No erythema. Psychiatric: She has a normal mood and affect. Her behavior is normal. Judgment and thought content normal.   Nursing note and vitals reviewed. [facility]      ED Course     Assessment & Plan:     Orders Placed This Encounter    URINE CULTURE HOLD SAMPLE    URINALYSIS W/MICROSCOPIC    CBC WITH AUTOMATED DIFF    METABOLIC PANEL, COMPREHENSIVE    LIPASE    POC URINE PREGNANCY TEST    mylanta/viscous lidocaine (KIRK)(GI COCKTAIL)    famotidine (PF) (PEPCID) injection 20 mg    sodium chloride 0.9 % bolus infusion 1,000 mL    dicyclomine (BENTYL) 10 mg/mL injection 20 mg       Discussed with Naomi Larsen MD,ED Provider    Austin Yates NP  07/21/18  7:31 PM      Procedures    UA pending. Labs not concerning and are baseline. Continue to monitor after meds given.      Austin Yates NP  07/21/18  8:35 PM    Patient requesting Zofran    Austin Yates NP  07/21/18  8:54 PM    Signed out to Dr. Andrae Fu NP  07/21/18  9:11 PM

## 2018-07-22 NOTE — ED NOTES
AIDET communication provided and informed of purposeful rounding to include collaboration of entire care team; patient acknowledged understanding. IVF infusing via pump without difficulty. Pt on continuous pulse ox and BP. Call bell within reach. Sheet and lights dimmed for comfort. Will continue to monitor.

## 2018-07-22 NOTE — ED NOTES
Patient discharged home after receiving discharge instructions from MD.  Patient voiced understanding and doesn't have any questions at this time. Patient in no distress at this time. Pt ambulated out of the ER and brother is taking her home.

## 2018-07-22 NOTE — ED NOTES
Dr. Gates Shoulder aware that pt is requesting pain medicine. No new orders received at this time.  Pt states that Norco usually helps and has a Rx for them, but pharmacy won't fill it until the 24th

## 2018-08-14 ENCOUNTER — CLINICAL SUPPORT (OUTPATIENT)
Dept: SURGERY | Age: 38
End: 2018-08-14

## 2018-08-14 VITALS — BODY MASS INDEX: 55.35 KG/M2 | WEIGHT: 293 LBS

## 2018-08-14 DIAGNOSIS — E66.01 MORBID OBESITY WITH BMI OF 50.0-59.9, ADULT (HCC): Primary | ICD-10-CM

## 2018-08-14 NOTE — PROGRESS NOTES
[facility] General Surgery at [facility]  Supervised Weight Loss     Date:   2018    Patient's Name: Monik Frias  : 1980    Insurance:  [employer]          Session: 3 of  6  Surgery: Gastric Bypass  Surgeon:  Ileana Guerrero M.D. Height: 68\"   Weight:    364      Lbs. BMI: 55   Pounds Lost since last month: 2#               Pounds Gained since last month: 0    Starting Weight: 366#   Previous Months Weight: 366#  Overall Pounds Lost: 2#  Overall Pounds Gained: 0    Other Pertinent Information: n/a     Smoking Status:  none  Alcohol Intake: none    I have reviewed with pt the guidelines of the supervised wt loss class. Pt understands the expectations of some wt loss during the wt loss trial.  Pt understands that wt gain could delay the process. I have also expressed to pt that classes need to be consecutive. Missing a class may subject pt to have to start over. Pt has received this information in writing. Changes that patient has made since last month include:  More active (swimming), portion control. Eating Habits and Behaviors  A review of the general nutrition guidelines in preparation for weight loss surgery was provided. Pts were instructed that their plate should be made up 1/2 plate coming from non-starchy vegetables, 1/4 coming from lean meat, and 1/4 of their plate coming from carbohydrates, including fruits, starches, or milk. We discussed measuring meals to 1/2 cup total per meal after surgery. Emphasis was placed on the importance of eating 3 meals a day and aiming for 60 grams of protein per day. I educated the pt on limiting liquid calories and drinking only calorie-free, sugar-free and non-carbonated beverages. We discussed the importance of drinking 64 ounces of fluid per day to prevent dehydration post-operatively. Patient's current diet habits include: eating 3 meals a day. Snacking on nuts, fresh veggies, strawberries. Avoiding refined carbohydrates, sweets, desserts. Eating a mixture of baked, grilled, broiled and fried foods. Eating out is 1-3 times per week. Drinking 40-50 oz water, 10 oz diet soda, 8 oz caffeine and 12 oz Crystal Light. Reports sometimes emotional eating and denies situational eating. Is not packing meals when away from home. Eating most meals at a table or while watching television. Takes 10-15 minutes to finish the meal. Reports food choices and lack of activity are barriers to weight loss. Physical Activity/Exercise  We talked about the importance of increasing daily physical activity and beginning to develop an exercise regimen/routine. We discussed that exercise is an important part of long term weight loss after surgery. Comments:  During class, I discussed with patient the importance of getting into an exercise routine. Pt is currently doing sit down exercises 2-3 times per week for activity. Pt has been encouraged to maintain and/or increase as tolerated. Behavior Modification       An education lesson specific to mindful eating behaviors was provided. We discussed the importance of eating mindfully after wt loss surgery to prevent food intolerance and prevent wt regain. We talked about how to eat more mindfully and identify emotional eating triggers. Tips and recommendations for how to make these changes were provided. Pt was encouraged to keep a food journal and record what they were taking in daily. Overall Assessment: Patient demonstrates appropriate lifestyle changes in preparation for weight loss surgery evidenced by reported changes and weight loss. Will continue to assess as pt works to complete supervised weight loss requirements. Patient-Set Goals:   1. Nutrition - no tv as distraction  2. Exercise - walk more frequently  3.  Behavior -attend a weight loss seminar     Bernard Plaza RD  8/14/2018

## 2018-09-18 ENCOUNTER — CLINICAL SUPPORT (OUTPATIENT)
Dept: SURGERY | Age: 38
End: 2018-09-18

## 2018-09-18 DIAGNOSIS — E66.01 MORBID OBESITY WITH BMI OF 50.0-59.9, ADULT (HCC): Primary | ICD-10-CM

## 2018-09-19 VITALS — WEIGHT: 293 LBS | BODY MASS INDEX: 55.65 KG/M2

## 2018-09-19 NOTE — PROGRESS NOTES
[facility] General Surgery at [facility]  Supervised Weight Loss     Date:   2018    Patient's Name: Nitin Franco  : 1980    Insurance:  [employer]          Session: 4 of  6  Surgery: Gastric Bypass  Surgeon:  Laureen Ku. Height: 68\" Weight:    366      Lbs. BMI: 55   Pounds Lost since last month: 0               Pounds Gained since last month: 2#    Starting Weight: 366#   Previous Months Weight: 364#  Overall Pounds Lost: 0  Overall Pounds Gained: 0    Other Pertinent Information: n/a     Smoking Status:  none  Alcohol Intake: none    I have reviewed with pt the guidelines of the supervised wt loss class. Pt understands the expectations of some wt loss during the wt loss trial.  Pt understands that wt gain could delay the process. I have also expressed to pt that classes need to be consecutive. Missing a class may subject pt to have to start over. Pt has received this information in writing. Changes that patient has made since last month include:  Meal prepping, smaller portions. Eating Habits and Behaviors  A review of the general nutrition guidelines in preparation for weight loss surgery was provided. A nutrition education lesson specific to portion control both before and after surgery was provided. Pts were instructed that their plate should be made up 1/2 plate coming from non-starchy vegetables, 1/4 coming from lean meat, and 1/4 of their plate coming from carbohydrates, including fruits, starches, or milk. We discussed measuring meals to 1/2 cup total per meal after surgery. Emphasis was placed on the importance of eating 3 meals a day and aiming for 60 grams of protein per day and drinking only calorie-free, sugar-free and non-carbonated beverages. We discussed the importance of drinking 64 ounces of fluid per day to prevent dehydration post-operatively.                        Patient's current diet habits include: eating 2-3 meals per day.snacking on cheese,olives and strawberries. Denies intake of refined carbohydrate foods and ate cake once this past month for her birthday. Eating mixture of baked, grilled, broiled and some fried foods. Eating out is 1-3 times per week. Drinking 48 oz water, 8 oz unsweetened tea, 8 oz diet soda, 6 oz coffee. Denies emotional eating. Is not packing meals when away from home. Eating most meals at a table and takes 10-15 minutes to finish the meal. Reports night eating is biggest barrier to weight loss at this time. Physical Activity/Exercise  We talked about the importance of increasing daily physical activity and beginning to develop an exercise regimen/routine. We discussed that exercise is an important part of long term weight loss after surgery. Comments:  During class, I discussed with patient the importance of getting into an exercise routine. Pt is currently walking, swimming 30 minutes daily for activity. Pt has been encouraged to maintain and increase as tolerated. Behavior Modification       We talked about how to eat more mindfully and identify emotional eating triggers. Tips and recommendations for how to make these changes were provided. Pt was encouraged to keep a food journal and record what they were taking in daily. Overall Assessment: Patient demonstrates appropriate lifestyle changes in preparation for weight loss surgery evidenced by reported changes and weight maintenance. Will continue to assess as pt works to complete supervised weight loss requirements. Patient-Set Goals:   1. Nutrition - pack meals when traveling, decrease portions  2. Exercise - more variation with exercise   3.  Behavior -chew slower, not so many distractions    Sreedhar Freeman, MACKENZIE  9/19/2018

## 2018-10-16 ENCOUNTER — CLINICAL SUPPORT (OUTPATIENT)
Dept: SURGERY | Age: 38
End: 2018-10-16

## 2018-10-16 VITALS — WEIGHT: 293 LBS | BODY MASS INDEX: 55.5 KG/M2

## 2018-10-16 DIAGNOSIS — E66.01 MORBID OBESITY WITH BMI OF 50.0-59.9, ADULT (HCC): Primary | ICD-10-CM

## 2018-10-16 NOTE — PROGRESS NOTES
[facility] Scott [facility] at [facility]  Supervised Weight Loss     Date:   10/16/2018    Patient's Name: Aiden Shown  : 1980    Insurance:  [employer]          Session: 5 of  6  Surgery: Gastric Byapss  Surgeon:  Duane Vargas M.D. Height: 68\"   Weight:    365      Lbs. BMI: 55   Pounds Lost since last month: 1#               Pounds Gained since last month: 0    Starting Weight: 366#   Previous Months Weight: 366#  Overall Pounds Lost: 1#  Overall Pounds Gained: 0    Other Pertinent Information: n/a     Smoking Status:  none  Alcohol Intake: none    I have reviewed with pt the guidelines of the supervised wt loss class. Pt understands the expectations of some wt loss during the wt loss trial.  Pt understands that wt gain could delay the process. I have also expressed to pt that classes need to be consecutive. Missing a class may subject pt to have to start over. Pt has received this information in writing. Changes that patient has made since last month include:  Slowing down while eating, using smaller plates, paying attention to fullness. Eating Habits and Behaviors  A review of the general nutrition guidelines in preparation for weight loss surgery was provided. Pts were instructed that their plate should be made up 1/2 plate coming from non-starchy vegetables, 1/4 coming from lean meat, and 1/4 of their plate coming from carbohydrates, including fruits, starches, or milk. We discussed measuring meals to 1/2 cup total per meal after surgery. Emphasis was placed on the importance of eating 3 meals a day and aiming for 60 grams of protein per day and drinking only calorie-free, sugar-free and non-carbonated beverages. We discussed the importance of drinking 64 ounces of fluid per day to prevent dehydration post-operatively. Patient's current diet habits include: eating 2-3 meals per day. Snacking on olives, nuts, carrots.  Avoiding refined carbohydrates, sweets, desserts. Eating out is once a week. Eating a combination of baked, grilled, broiled and some fried foods. Drinking 48 oz water, sweetened and unsweetened ice tea, diet soda (not daily). Denies emotional or situational eating. Eating most meals at a table and takes 15-20 minutes to finish the meal. Reports night eating and lack of activity are biggest barriers to wt loss. Physical Activity/Exercise  An education lesson specific to exercise was provided this month. We talked about the importance of increasing daily physical activity and beginning to develop an exercise regimen/routine. We discussed barriers to exercise and ways to work around those barriers. We talked about exercise as being an important part of long term weight loss after surgery. Comments:  During class, I discussed with patient the importance of getting into an exercise routine. Pt is currently not exercising d/t knee injury. Pt has been encouraged to resume when able. Behavior Modification       We talked about how to eat more mindfully and identify emotional eating triggers. Tips and recommendations for how to make these changes were provided. Pt was encouraged to keep a food journal and record what they were taking in daily. Overall Assessment: Patient demonstrates appropriate lifestyle changes in preparation for weight loss surgery evidenced by reported changes and weight loss/weight maintenance. Will continue to assess as pt works to complete supervised weight loss requirements. Patient-Set Goals:   1. Nutrition - eat breakfast   2. Exercise - start exercising after knee is healed, research exercise programs   3.  Behavior -plan better snacks     Boni Kay, MACKENZIE  10/16/2018

## 2018-11-14 ENCOUNTER — CLINICAL SUPPORT (OUTPATIENT)
Dept: SURGERY | Age: 38
End: 2018-11-14

## 2018-11-14 VITALS — BODY MASS INDEX: 55.04 KG/M2 | WEIGHT: 293 LBS

## 2018-11-14 DIAGNOSIS — E66.01 MORBID OBESITY WITH BMI OF 50.0-59.9, ADULT (HCC): Primary | ICD-10-CM

## 2018-11-14 NOTE — PROGRESS NOTES
Vidya Hoyt [facility] at [facility]  Supervised Weight Loss     Date:   2018    Patient's Name: Gin You  : 1980    Insurance:  [employer]          Session: 6 of  6  Surgery: Gastric Bypass  Surgeon:  Vineet Arriola M.D. Height: 68\"  Weight:    362      Lbs. BMI: 55   Pounds Lost since last month: 3#               Pounds Gained since last month: 0    Starting Weight: 366#   Previous Months Weight: 365#  Overall Pounds Lost: 4#  Overall Pounds Gained: 0    Other Pertinent Information: n/a     Smoking Status:   none  Alcohol Intake: none    I have reviewed with pt the guidelines of the supervised wt loss program.  Pt understands the expectations of some wt loss during the program and that wt gain could delay the process. I have also explained that classes need to be consecutive. Missing a class may result in starting over. Pt has received this information in writing. Changes that patient has made since last month include:  Eating breakfast every morning, leaner meats, more exercise. Eating Habits and Behaviors  A nutrition lesson specific to portion control and meal planning was presented. Emphasis was placed on general healthy eating using the balanced plate method. Pts were instructed that their plate should be made up 1/2 plate coming from non-starchy vegetables, 1/4 coming from lean meat, and 1/4 of their plate coming from carbohydrates, including fruits, starches, or milk. We discussed measuring meals to 1/2 cup total per meal after surgery. Emphasis was placed on the importance of eating 3 meals a day and aiming for 60 grams of protein per day and drinking only calorie-free, sugar-free and non-carbonated beverages. We discussed the importance of drinking 64 ounces of fluid per day to prevent dehydration post-operatively. Patient's current diet habits include: eating 3 meals a day. Snacking on carrots, peanuts, cheese.  Eating no refined carbohydrates and no sweets/desserts. Eating baked, grilled, broiled foods. Eating out is 1-3 times per week. Drinking 38 oz water, 8 oz unsweetened tea, 8 oz diet soda less than daily, 8 oz coffee and 8 oz Crystal Light. Denies emotional or situational eating. Sometimes packing meals when away from home. Eating most meals at a table and takes 15-20 minutes to finish the meal. Reports portion sizes and late night eating \"use to be\" biggest barriers to weight loss. Physical Activity/Exercise  We talked about the importance of increasing daily physical activity and beginning to develop an exercise regimen/routine. We talked about exercise as being an important part of long term weight loss after surgery. Comments:  During class, I discussed with patient the importance of getting into an exercise routine. Pt is currently doing chair exercises for 30 minutes daily and walking for activity. Pt has been encouraged to maintain and increase as tolerated. Behavior Modification       We talked about how to eat more mindfully and identify emotional eating triggers. Tips and recommendations for how to make these changes were provided. Pt was encouraged to keep a food journal and record what they were taking in daily. Overall Assessment: Patient demonstrates appropriate lifestyle changes in preparation for weight loss surgery evidenced by reported changes and weight loss. Demonstrates good understanding of general nutrition guidelines and appears to be an appropriate candidate for surgery at this time. Patient-Set Goals:   1. Nutrition - measure all foods, more veggies at breakfast   2. Exercise - continue exercise and walking  3.  Behavior -stop using straws     Mervin Damian, RD  11/14/2018

## 2019-01-07 ENCOUNTER — TELEPHONE (OUTPATIENT)
Dept: SURGERY | Age: 39
End: 2019-01-07

## 2019-01-07 NOTE — TELEPHONE ENCOUNTER
Patient identified with two patient identifiers. States she spoke with Jazmine Beatty and she answered all her questions.

## 2019-01-10 ENCOUNTER — OFFICE VISIT (OUTPATIENT)
Dept: SURGERY | Age: 39
End: 2019-01-10

## 2019-01-10 VITALS
WEIGHT: 293 LBS | HEART RATE: 98 BPM | RESPIRATION RATE: 20 BRPM | SYSTOLIC BLOOD PRESSURE: 130 MMHG | HEIGHT: 68 IN | OXYGEN SATURATION: 95 % | TEMPERATURE: 98.1 F | DIASTOLIC BLOOD PRESSURE: 90 MMHG | BODY MASS INDEX: 44.41 KG/M2

## 2019-01-10 DIAGNOSIS — K21.9 GASTROESOPHAGEAL REFLUX DISEASE WITHOUT ESOPHAGITIS: ICD-10-CM

## 2019-01-10 DIAGNOSIS — E66.01 OBESITY, MORBID (HCC): Primary | ICD-10-CM

## 2019-01-10 NOTE — PROGRESS NOTES
1. Have you been to the ER, urgent care clinic since your last visit? Hospitalized since your last visit? No    2. Have you seen or consulted any other health care providers outside of the [address] [place] [address] since your last visit? Include any pap smears or colon screening.  pcp

## 2019-01-11 NOTE — PATIENT INSTRUCTIONS
Learning About Bariatric Surgery  What is bariatric surgery? Bariatric surgery is surgery to help you lose weight. This type of surgery is only used for people who are very overweight and have not been able to lose weight with diet and exercise. This surgery makes the stomach smaller. Some types of surgery also change the connection between your stomach and intestines. How is bariatric surgery done? Bariatric surgery may be either \"open\" or \"laparoscopic. \" Open surgery is done through a large cut (incision) in the belly. Laparoscopic surgery is done through several small cuts. The doctor puts a lighted tube, or scope, and other surgical tools through small cuts in your belly. The doctor is able to see your organs with the scope. There are different types of bariatric surgery. Gastric sleeve surgery  The surgery is usually done through several small incisions in the belly. The doctor removes more than half of your stomach. This leaves a thin sleeve, or tube, that is about the size of a banana. Because part of your stomach has been removed, this can't be reversed. Soila-en-Y gastric bypass surgery  Soila-en-Y (say \"sidney-en-why\") surgery changes the connection between the stomach and the intestines. The doctor separates a section of your stomach from the rest of your stomach. This makes a small pouch. The new pouch will hold the food you eat. The doctor connects the stomach pouch to the middle part of the small intestine. Gastric banding surgery  The surgery is usually done through several small incisions in the belly. The doctor wraps a band around the upper part of the stomach. This creates a small pouch. The small size of the pouch means that you will get full after you eat just a small amount of food. The doctor can inflate or deflate the band to adjust the size. This lets the doctor adjust how quickly food passes from the new pouch into the stomach.  It does not change the connection between the stomach and the intestines. What can you expect after the surgery? You may stay in the hospital for one or more days after the surgery. How long you stay depends on the type of surgery you had. Most people need 2 to 4 weeks before they are ready to get back to their usual routine. For the first 2 to 6 weeks after surgery, you probably will need to follow a liquid or soft diet. Bit by bit, you will be able to eat more solid foods. Your doctor may advise you to work with a dietitian. This way you'll be sure to get enough protein, vitamins, and minerals while you are losing weight. Even with a healthy diet, you may need to take vitamin and mineral supplements. After surgery, you will not be able to eat very much at one time. You will get full quickly. Try not to eat too much at one time or eat foods that are high in fat or sugar. If you do, you may vomit, get stomach pain, or have diarrhea. You probably will lose weight very quickly in the first few months after surgery. As time goes on, your weight loss will slow down. You will have regular doctor visits to check how you are doing. Think of bariatric surgery as a tool to help you lose weight. It isn't an instant fix. You will still need to eat a healthy diet and get regular exercise. This will help you reach your weight goal and avoid regaining the weight you lose. Follow-up care is a key part of your treatment and safety. Be sure to make and go to all appointments, and call your doctor if you are having problems. It's also a good idea to know your test results and keep a list of the medicines you take. Where can you learn more? Go to http://arlene-joann.info/. Enter G469 in the search box to learn more about \"Learning About Bariatric Surgery. \"  Current as of: June 26, 2018  Content Version: 11.8  © 1995-7945 [employer]wise, Incorporated.  Care instructions adapted under license by [employer] (which disclaims liability or warranty for this information). If you have questions about a medical condition or this instruction, always ask your healthcare professional. [place] [address] any warranty or liability for your use of this information.

## 2019-01-12 NOTE — PROGRESS NOTES
Subjective: The patient is a 34 y.o. obese female seeking approval for laparoscopic gastric bypass. Body mass index is 56.87 kg/m². Bryant Vera has tried multiple diets in her  lifetime most recently trying unsupervised diets during which she was able to lose small amounts of weight. Bariatric comorbidities present are   Past Medical History:   Diagnosis Date    Asthma     GERD (gastroesophageal reflux disease)     Hypothyroid     Second hand smoke exposure        Patient Active Problem List    Diagnosis Date Noted    Obesity, morbid ([address] 05/15/2018    Normal labor 2015    Condyloma acuminata of vulva during pregnancy 2015    Papanicolaou smear of cervix with atypical squamous cells of undetermined significance (ASC-US) 2015    Cervical high risk human papillomavirus (HPV) DNA test positive 2015    Rubella non-immune status, antepartum 2015    Obesity in pregnancy with antepartum complication     Supervision of other high-risk pregnancy(V23.89) 2015     Past Medical History:   Diagnosis Date    Asthma     GERD (gastroesophageal reflux disease)     Hypothyroid     Second hand smoke exposure       Past Surgical History:   Procedure Laterality Date    HX  SECTION        Social History     Tobacco Use    Smoking status: Former Smoker     Packs/day: 1.00     Years: 15.00     Pack years: 15.00     Last attempt to quit: 2014     Years since quittin.2    Smokeless tobacco: Never Used   Substance Use Topics    Alcohol use: No      Family History   Problem Relation Age of Onset   ArgusRoseanne Arthritis-rheumatoid Mother     COPD Mother     Diabetes Father     Stroke Father     Hypertension Father     Cancer Maternal Uncle     Diabetes Paternal Grandmother     Breast Cancer Neg Hx     Ovarian Cancer Neg Hx     Uterine Cancer Neg Hx       Prior to Admission medications    Medication Sig Start Date End Date Taking?  Authorizing Provider   acetaminophen (TYLENOL EXTRA STRENGTH) 500 mg tablet Take 1,500 mg by mouth every six (6) hours as needed for Pain. Yes Other, MD Masha   etonogestrel (NEXPLANON) 68 mg impl by SubDERmal route. Yes Other, MD Masha   levothyroxine (SYNTHROID) 125 mcg tablet Take  by mouth Daily (before breakfast). Yes Other, MD Masha   famotidine (PEPCID) 20 mg tablet Take 20 mg by mouth two (2) times a day. Yes Other, MD Masha   PROAIR HFA 90 mcg/actuation inhaler Take 2 Puffs by inhalation as needed. Every 4 to 6 hours as needed 3/4/16  Yes Provider, Historical   ondansetron (ZOFRAN ODT) 4 mg disintegrating tablet Take 1 Tab by mouth every eight (8) hours as needed for Nausea. 7/21/18   Ginna GOMEZ NP   dicyclomine (BENTYL) 10 mg capsule Take 2 Caps by mouth four (4) times daily. 7/21/18   Kia Marsh MD   ondansetron (ZOFRAN ODT) 4 mg disintegrating tablet Take 1 Tab by mouth every eight (8) hours as needed for Nausea. 7/21/18   Kia Marsh MD   fluticasone-salmeterol (ADVAIR DISKUS) 250-50 mcg/dose diskus inhaler Take 1 Puff by inhalation every twelve (12) hours. Other, MD Masha   ergocalciferol (ERGOCALCIFEROL) 50,000 unit capsule  1/4/17   Provider, Historical   calcium carbonate-vitamin D3 600 mg(1,500mg) -800 unit tab TAKE 1 TABLET BY MOUTH TWICE A DAY 12/16/16   Provider, Historical     Allergies   Allergen Reactions    Percocet [Oxycodone-Acetaminophen] Nausea and Vomiting         Objective:     Visit Vitals  /90   Pulse 98   Temp 98.1 °F (36.7 °C)   Resp 20   Ht 5' 8\" (1.727 m)   Wt (!) 374 lb (169.6 kg)   SpO2 95%   BMI 56.87 kg/m²       Assessment:     Morbid obesity; no success with medical management. She has completed all pre-op pre-requisites and has been found to be a good candidate for weight loss surgery; thyroid disease better managed. Plan:     1. Continue diet, exercise regiment. 2. Will submit for pre-authorization for laparoscopic gastric bypass.     20 minutes spent with patient (greater than 50% of time in face-face consultation reviewing choice of procedure, potential risks, technical aspects of procedure, post-op diet, activity restrictions).     Signed By: Virginie Beard MD     January 12, 2019

## 2019-01-31 DIAGNOSIS — Z01.818 PRE-OP EXAM: Primary | ICD-10-CM

## 2019-02-26 ENCOUNTER — HOSPITAL ENCOUNTER (OUTPATIENT)
Dept: GENERAL RADIOLOGY | Age: 39
Discharge: HOME | End: 2019-02-26
Attending: NURSE PRACTITIONER
Payer: MEDICAID

## 2019-02-26 ENCOUNTER — HOSPITAL ENCOUNTER (OUTPATIENT)
Dept: ULTRASOUND IMAGING | Age: 39
Discharge: HOME | End: 2019-02-26
Attending: NURSE PRACTITIONER
Payer: MEDICAID

## 2019-02-26 ENCOUNTER — HOSPITAL ENCOUNTER (OUTPATIENT)
Dept: PREADMISSION TESTING | Age: 39
Discharge: HOME | End: 2019-02-26
Payer: MEDICAID

## 2019-02-26 ENCOUNTER — OFFICE VISIT (OUTPATIENT)
Dept: SURGERY | Age: 39
End: 2019-02-26

## 2019-02-26 VITALS
RESPIRATION RATE: 20 BRPM | HEART RATE: 93 BPM | HEIGHT: 68 IN | TEMPERATURE: 98.3 F | WEIGHT: 293 LBS | BODY MASS INDEX: 44.41 KG/M2 | OXYGEN SATURATION: 95 % | DIASTOLIC BLOOD PRESSURE: 81 MMHG | SYSTOLIC BLOOD PRESSURE: 118 MMHG

## 2019-02-26 VITALS
HEIGHT: 68 IN | SYSTOLIC BLOOD PRESSURE: 118 MMHG | WEIGHT: 293 LBS | HEART RATE: 93 BPM | BODY MASS INDEX: 44.41 KG/M2 | DIASTOLIC BLOOD PRESSURE: 81 MMHG | TEMPERATURE: 98.3 F

## 2019-02-26 DIAGNOSIS — K27.9 PUD (PEPTIC ULCER DISEASE): ICD-10-CM

## 2019-02-26 DIAGNOSIS — E03.9 HYPOTHYROIDISM, UNSPECIFIED TYPE: ICD-10-CM

## 2019-02-26 DIAGNOSIS — E66.01 OBESITY, MORBID (HCC): Primary | ICD-10-CM

## 2019-02-26 DIAGNOSIS — Z01.818 PRE-OP EXAM: ICD-10-CM

## 2019-02-26 DIAGNOSIS — K21.9 GASTROESOPHAGEAL REFLUX DISEASE WITHOUT ESOPHAGITIS: ICD-10-CM

## 2019-02-26 DIAGNOSIS — E55.9 VITAMIN D DEFICIENCY: ICD-10-CM

## 2019-02-26 LAB
25(OH)D3 SERPL-MCNC: 23.6 NG/ML (ref 30–100)
ALBUMIN SERPL-MCNC: 3.5 G/DL (ref 3.5–5)
ALBUMIN/GLOB SERPL: 0.9 {RATIO} (ref 1.1–2.2)
ALP SERPL-CCNC: 93 U/L (ref 45–117)
ALT SERPL-CCNC: 16 U/L (ref 12–78)
ANION GAP SERPL CALC-SCNC: 8 MMOL/L (ref 5–15)
APPEARANCE UR: CLEAR
ARTERIAL PATENCY WRIST A: YES
AST SERPL-CCNC: 9 U/L (ref 15–37)
ATRIAL RATE: 84 BPM
BACTERIA URNS QL MICRO: NEGATIVE /HPF
BASE DEFICIT BLD-SCNC: 3 MMOL/L
BASOPHILS # BLD: 0.1 K/UL (ref 0–0.1)
BASOPHILS NFR BLD: 1 % (ref 0–1)
BDY SITE: ABNORMAL
BILIRUB SERPL-MCNC: 0.3 MG/DL (ref 0.2–1)
BILIRUB UR QL: NEGATIVE
BUN SERPL-MCNC: 15 MG/DL (ref 6–20)
BUN/CREAT SERPL: 19 (ref 12–20)
CALCIUM SERPL-MCNC: 8.9 MG/DL (ref 8.5–10.1)
CALCULATED P AXIS, ECG09: 26 DEGREES
CALCULATED R AXIS, ECG10: 2 DEGREES
CALCULATED T AXIS, ECG11: 17 DEGREES
CHLORIDE SERPL-SCNC: 109 MMOL/L (ref 97–108)
CHOLEST SERPL-MCNC: 163 MG/DL
CO2 SERPL-SCNC: 23 MMOL/L (ref 21–32)
COLOR UR: ABNORMAL
CREAT SERPL-MCNC: 0.77 MG/DL (ref 0.55–1.02)
CRP SERPL-MCNC: 1.09 MG/DL (ref 0–0.6)
DIAGNOSIS, 93000: NORMAL
DIFFERENTIAL METHOD BLD: ABNORMAL
EOSINOPHIL # BLD: 0.4 K/UL (ref 0–0.4)
EOSINOPHIL NFR BLD: 3 % (ref 0–7)
EPITH CASTS URNS QL MICRO: ABNORMAL /LPF
ERYTHROCYTE [DISTWIDTH] IN BLOOD BY AUTOMATED COUNT: 14.5 % (ref 11.5–14.5)
GAS FLOW.O2 O2 DELIVERY SYS: ABNORMAL L/MIN
GLOBULIN SER CALC-MCNC: 4 G/DL (ref 2–4)
GLUCOSE SERPL-MCNC: 94 MG/DL (ref 65–100)
GLUCOSE UR STRIP.AUTO-MCNC: NEGATIVE MG/DL
HCO3 BLD-SCNC: 21.6 MMOL/L (ref 22–26)
HCT VFR BLD AUTO: 43.3 % (ref 35–47)
HGB BLD-MCNC: 13 G/DL (ref 11.5–16)
HGB UR QL STRIP: NEGATIVE
HYALINE CASTS URNS QL MICRO: ABNORMAL /LPF (ref 0–5)
IMM GRANULOCYTES # BLD AUTO: 0.1 K/UL (ref 0–0.04)
IMM GRANULOCYTES NFR BLD AUTO: 0 % (ref 0–0.5)
KETONES UR QL STRIP.AUTO: NEGATIVE MG/DL
LEUKOCYTE ESTERASE UR QL STRIP.AUTO: NEGATIVE
LYMPHOCYTES # BLD: 2.1 K/UL (ref 0.8–3.5)
LYMPHOCYTES NFR BLD: 19 % (ref 12–49)
MAGNESIUM SERPL-MCNC: 2.1 MG/DL (ref 1.6–2.4)
MCH RBC QN AUTO: 27.9 PG (ref 26–34)
MCHC RBC AUTO-ENTMCNC: 30 G/DL (ref 30–36.5)
MCV RBC AUTO: 92.9 FL (ref 80–99)
MONOCYTES # BLD: 0.6 K/UL (ref 0–1)
MONOCYTES NFR BLD: 5 % (ref 5–13)
NEUTS SEG # BLD: 8 K/UL (ref 1.8–8)
NEUTS SEG NFR BLD: 72 % (ref 32–75)
NITRITE UR QL STRIP.AUTO: NEGATIVE
NRBC # BLD: 0 K/UL (ref 0–0.01)
NRBC BLD-RTO: 0 PER 100 WBC
O2/TOTAL GAS SETTING VFR VENT: 21 %
P-R INTERVAL, ECG05: 136 MS
PCO2 BLD: 36.4 MMHG (ref 35–45)
PH BLD: 7.38 [PH] (ref 7.35–7.45)
PH UR STRIP: 5 [PH] (ref 5–8)
PLATELET # BLD AUTO: 435 K/UL (ref 150–400)
PMV BLD AUTO: 8.9 FL (ref 8.9–12.9)
PO2 BLD: 71 MMHG (ref 80–100)
POTASSIUM SERPL-SCNC: 3.9 MMOL/L (ref 3.5–5.1)
PROT SERPL-MCNC: 7.5 G/DL (ref 6.4–8.2)
PROT UR STRIP-MCNC: NEGATIVE MG/DL
Q-T INTERVAL, ECG07: 370 MS
QRS DURATION, ECG06: 94 MS
QTC CALCULATION (BEZET), ECG08: 437 MS
RBC # BLD AUTO: 4.66 M/UL (ref 3.8–5.2)
RBC #/AREA URNS HPF: ABNORMAL /HPF (ref 0–5)
SAO2 % BLD: 94 % (ref 92–97)
SODIUM SERPL-SCNC: 140 MMOL/L (ref 136–145)
SP GR UR REFRACTOMETRY: 1.02 (ref 1–1.03)
SPECIMEN TYPE: ABNORMAL
T4 FREE SERPL-MCNC: 1 NG/DL (ref 0.8–1.5)
TSH SERPL DL<=0.05 MIU/L-ACNC: 5.16 UIU/ML (ref 0.36–3.74)
UA: UC IF INDICATED,UAUC: ABNORMAL
UROBILINOGEN UR QL STRIP.AUTO: 0.2 EU/DL (ref 0.2–1)
VENTRICULAR RATE, ECG03: 84 BPM
WBC # BLD AUTO: 11.2 K/UL (ref 3.6–11)
WBC URNS QL MICRO: ABNORMAL /HPF (ref 0–4)

## 2019-02-26 PROCEDURE — 84443 ASSAY THYROID STIM HORMONE: CPT

## 2019-02-26 PROCEDURE — 84439 ASSAY OF FREE THYROXINE: CPT

## 2019-02-26 PROCEDURE — 82306 VITAMIN D 25 HYDROXY: CPT

## 2019-02-26 PROCEDURE — 87086 URINE CULTURE/COLONY COUNT: CPT

## 2019-02-26 PROCEDURE — 85025 COMPLETE CBC W/AUTO DIFF WBC: CPT

## 2019-02-26 PROCEDURE — 86140 C-REACTIVE PROTEIN: CPT

## 2019-02-26 PROCEDURE — 80053 COMPREHEN METABOLIC PANEL: CPT

## 2019-02-26 PROCEDURE — 76700 US EXAM ABDOM COMPLETE: CPT

## 2019-02-26 PROCEDURE — 36600 WITHDRAWAL OF ARTERIAL BLOOD: CPT

## 2019-02-26 PROCEDURE — 71046 X-RAY EXAM CHEST 2 VIEWS: CPT

## 2019-02-26 PROCEDURE — 36415 COLL VENOUS BLD VENIPUNCTURE: CPT

## 2019-02-26 PROCEDURE — 93005 ELECTROCARDIOGRAM TRACING: CPT

## 2019-02-26 PROCEDURE — 82803 BLOOD GASES ANY COMBINATION: CPT

## 2019-02-26 PROCEDURE — 82465 ASSAY BLD/SERUM CHOLESTEROL: CPT

## 2019-02-26 PROCEDURE — 83735 ASSAY OF MAGNESIUM: CPT

## 2019-02-26 PROCEDURE — 81001 URINALYSIS AUTO W/SCOPE: CPT

## 2019-02-26 RX ORDER — ENOXAPARIN SODIUM 100 MG/ML
40 INJECTION SUBCUTANEOUS DAILY
Qty: 14 SYRINGE | Refills: 0 | Status: SHIPPED | OUTPATIENT
Start: 2019-03-28 | End: 2019-06-26 | Stop reason: ALTCHOICE

## 2019-02-26 RX ORDER — ERGOCALCIFEROL 1.25 MG/1
50000 CAPSULE ORAL
Qty: 4 CAP | Refills: 2 | Status: SHIPPED | OUTPATIENT
Start: 2019-02-26 | End: 2019-06-15 | Stop reason: SDUPTHER

## 2019-02-26 RX ORDER — HYDROCODONE BITARTRATE AND ACETAMINOPHEN 10; 325 MG/1; MG/1
1 TABLET ORAL
COMMUNITY
End: 2019-06-14

## 2019-02-26 RX ORDER — GABAPENTIN 100 MG/1
100-200 CAPSULE ORAL 3 TIMES DAILY
Qty: 30 CAP | Refills: 0 | Status: SHIPPED | OUTPATIENT
Start: 2019-02-26 | End: 2019-03-03

## 2019-02-26 RX ORDER — ONDANSETRON 4 MG/1
4 TABLET, ORALLY DISINTEGRATING ORAL
Qty: 30 TAB | Refills: 0 | Status: SHIPPED | OUTPATIENT
Start: 2019-02-26

## 2019-02-26 RX ORDER — DOXYCYCLINE HYCLATE 100 MG
100 TABLET ORAL AS NEEDED
COMMUNITY
End: 2020-07-31

## 2019-02-26 NOTE — PERIOP NOTES
PATIENT GIVEN SURGICAL SITE INFECTION FAQ HANDOUT AND HAND WASHING TIP SHEET. PREOP INSTRUCTIONS REVIEWED AND PATIENT VERBALIZES UNDERSTANDING OF INSTRUCTIONS. PATIENT HAS BEEN GIVEN THE OPPORTUNITY TO ASK ADDITIONAL QUESTIONS. GAVE PATIENT 2 PACKAGES OF 6 CHG WIPES AND INSTRUCTIONS, PATIENT VERBALIZED UNDERSTANDING AND OPPORTUNITY TO ASK QUESTIONS. PATIENT REPORTED SHE WAS NOT TOLD OF ANY BOWEL PREP FOR THIS SURGERY. PATIENT HAS APPT AT [facility] ULTRASOUND,  YELLOW PAPER GIVEN WITH CXR AND ABG AND PATIENT INSTRUCTED TO GO TO OUTPATIENT REGISTRATION TO GET THIS TESTING DONE PER SURGEON PRIOR TO HER APPT WITH SURGEON TODAY. PATIENT VERBALIZED UNDERSTANDING.

## 2019-02-26 NOTE — PROGRESS NOTES
1. Have you been to the ER, urgent care clinic since your last visit? Hospitalized since your last visit? No    2. Have you seen or consulted any other health care providers outside of the [address] [place] [address] since your last visit? Include any pap smears or colon screening. Yes When: 2 weeks ago PCP.

## 2019-02-26 NOTE — PATIENT INSTRUCTIONS
Learning About Bariatric Surgery  What is bariatric surgery? Bariatric surgery is surgery to help you lose weight. This type of surgery is only used for people who are very overweight and have not been able to lose weight with diet and exercise. This surgery makes the stomach smaller. Some types of surgery also change the connection between your stomach and intestines. How is bariatric surgery done? Bariatric surgery may be either \"open\" or \"laparoscopic. \" Open surgery is done through a large cut (incision) in the belly. Laparoscopic surgery is done through several small cuts. The doctor puts a lighted tube, or scope, and other surgical tools through small cuts in your belly. The doctor is able to see your organs with the scope. There are different types of bariatric surgery. Gastric sleeve surgery  The surgery is usually done through several small incisions in the belly. The doctor removes more than half of your stomach. This leaves a thin sleeve, or tube, that is about the size of a banana. Because part of your stomach has been removed, this can't be reversed. Soila-en-Y gastric bypass surgery  Soila-en-Y (say \"sidney-en-why\") surgery changes the connection between the stomach and the intestines. The doctor separates a section of your stomach from the rest of your stomach. This makes a small pouch. The new pouch will hold the food you eat. The doctor connects the stomach pouch to the middle part of the small intestine. Gastric banding surgery  The surgery is usually done through several small incisions in the belly. The doctor wraps a band around the upper part of the stomach. This creates a small pouch. The small size of the pouch means that you will get full after you eat just a small amount of food. The doctor can inflate or deflate the band to adjust the size. This lets the doctor adjust how quickly food passes from the new pouch into the stomach.  It does not change the connection between the stomach and the intestines. What can you expect after the surgery? You may stay in the hospital for one or more days after the surgery. How long you stay depends on the type of surgery you had. Most people need 2 to 4 weeks before they are ready to get back to their usual routine. For the first 2 to 6 weeks after surgery, you probably will need to follow a liquid or soft diet. Bit by bit, you will be able to eat more solid foods. Your doctor may advise you to work with a dietitian. This way you'll be sure to get enough protein, vitamins, and minerals while you are losing weight. Even with a healthy diet, you may need to take vitamin and mineral supplements. After surgery, you will not be able to eat very much at one time. You will get full quickly. Try not to eat too much at one time or eat foods that are high in fat or sugar. If you do, you may vomit, get stomach pain, or have diarrhea. You probably will lose weight very quickly in the first few months after surgery. As time goes on, your weight loss will slow down. You will have regular doctor visits to check how you are doing. Think of bariatric surgery as a tool to help you lose weight. It isn't an instant fix. You will still need to eat a healthy diet and get regular exercise. This will help you reach your weight goal and avoid regaining the weight you lose. Follow-up care is a key part of your treatment and safety. Be sure to make and go to all appointments, and call your doctor if you are having problems. It's also a good idea to know your test results and keep a list of the medicines you take. Where can you learn more? Go to http://arlene-joann.info/. Enter G469 in the search box to learn more about \"Learning About Bariatric Surgery. \"  Current as of: June 25, 2018  Content Version: 11.9  © 6031-2152 [employer].  Care instructions adapted under license by [employer] (which disclaims liability or warranty for this information). If you have questions about a medical condition or this instruction, always ask your healthcare professional. [address] any warranty or liability for your use of this information. Take 1000mg of Tylenol with lunch and dinner the day before surgery. You may drink clear liquids up to 2 hours before surgery. Do NOT start medications prescribed until after surgery.

## 2019-02-26 NOTE — PROGRESS NOTES
HISTORY OF PRESENT ILLNESS  History and Physical.     The patient is a 34 y.o. obese female here for history and physical for Gastric bypass surgery. Body mass index is 58.54 kg/m². Visit Vitals  /81   Pulse 93   Temp 98.3 °F (36.8 °C) (Oral)   Resp 20   Ht 5' 8\" (1.727 m)   Wt (!) 385 lb (174.6 kg)   SpO2 95%   BMI 58.54 kg/m²         Patient has an advanced directive: NO        Ms. Patsy Keating has a reminder for a \"due or due soon\" health maintenance. I have asked that she contact her primary care provider for follow-up on this health maintenance.       Patient Active Problem List    Diagnosis Date Noted    Obesity, morbid ([address] 05/15/2018    Normal labor 2015    Condyloma acuminata of vulva during pregnancy 2015    Papanicolaou smear of cervix with atypical squamous cells of undetermined significance (ASC-US) 2015    Cervical high risk human papillomavirus (HPV) DNA test positive 2015    Rubella non-immune status, antepartum 2015    Obesity in pregnancy with antepartum complication     Supervision of other high-risk pregnancy(V23.89) 2015     Past Medical History:   Diagnosis Date    Adverse effect of anesthesia     WOKE UP DURING ENDOSCOPY WITH KETAMINE AND PATIENT REPORTS FEELING LIKE SHE WAS HALLUCINATING - BUT DID OK WITH PROPOFOL IN PAST WITH NO ISSUES PER PATIENT    Arthritis     BILATERAL KNEES- OA    Asthma     GERD (gastroesophageal reflux disease)     Hiatal hernia     Hypothyroid     Morbid obesity ([address]     PUD (peptic ulcer disease)     PRE ULCER -    Second hand smoke exposure       Past Surgical History:   Procedure Laterality Date    HX  SECTION      HX ENDOSCOPY        Social History     Tobacco Use    Smoking status: Former Smoker     Packs/day: 1.00     Years: 15.00     Pack years: 15.00     Last attempt to quit: 2014     Years since quittin.3    Smokeless tobacco: Never Used   Substance Use Topics    Alcohol use: No      Family History   Problem Relation Age of Onset   Gabe Arthritis-rheumatoid Mother     COPD Mother     Diabetes Father     Stroke Father     Hypertension Father     Cancer Maternal Uncle     Diabetes Paternal Grandmother     Breast Cancer Neg Hx     Ovarian Cancer Neg Hx     Uterine Cancer Neg Hx       Prior to Admission medications    Medication Sig Start Date End Date Taking? Authorizing Provider   doxycycline (VIBRA-TABS) 100 mg tablet Take 100 mg by mouth daily. Yes Provider, Historical   HYDROcodone-acetaminophen (NORCO)  mg tablet Take 1 Tab by mouth every six (6) hours as needed for Pain. Yes Provider, Historical   acetaminophen (TYLENOL EXTRA STRENGTH) 500 mg tablet Take 1,500 mg by mouth every six (6) hours as needed for Pain. Yes Other, MD Masha   etonogestrel (NEXPLANON) 68 mg impl by SubDERmal route. BIRTH CONTROL - 5/23/18 INSERTION DATE   Yes Other, MD Masha   ondansetron (ZOFRAN ODT) 4 mg disintegrating tablet Take 1 Tab by mouth every eight (8) hours as needed for Nausea. 7/21/18  Yes Emiliano GOMEZ, CHIKIS   dicyclomine (BENTYL) 10 mg capsule Take 2 Caps by mouth four (4) times daily. Patient taking differently: Take 20 mg by mouth as needed. 7/21/18  Yes Luther Hermosillo MD   levothyroxine (SYNTHROID) 125 mcg tablet Take 175 mcg by mouth Daily (before breakfast). Yes Other, MD Masha   famotidine (PEPCID) 20 mg tablet Take 20 mg by mouth daily. Yes Other, MD Masha   PROAIR HFA 90 mcg/actuation inhaler Take 2 Puffs by inhalation every four (4) hours as needed for Wheezing or Shortness of Breath.  Every 4 to 6 hours as needed 3/4/16  Yes Provider, Historical   calcium carbonate-vitamin D3 600 mg(1,500mg) -800 unit tab TAKE 1 TABLET BY MOUTH TWICE A DAY 12/16/16   Provider, Historical     Allergies   Allergen Reactions    Percocet [Oxycodone-Acetaminophen] Nausea and Vomiting       HPI    Review of Systems   Constitutional: Negative for chills, fever and malaise/fatigue. HENT: Negative for congestion, hearing loss, sinus pain, sore throat and tinnitus. Eyes: Negative for blurred vision, double vision, pain and discharge. Wears glasses   Respiratory: Positive for shortness of breath (with exertion, asthma, former smoker. ). Negative for cough, sputum production and wheezing. Cardiovascular: Negative for chest pain, palpitations and leg swelling. Gastrointestinal: Negative for abdominal pain, constipation, diarrhea, heartburn (takes Pepcid), nausea and vomiting. Genitourinary: Negative for dysuria, frequency and urgency. Musculoskeletal: Positive for joint pain (knees). Negative for back pain and neck pain. Takes Norco for PMDD and cramps and gastric pain. Skin: Negative for itching and rash. Neurological: Negative for dizziness, seizures and headaches. Psychiatric/Behavioral: Negative for depression. Physical Exam   Constitutional: She is oriented to person, place, and time. She appears well-developed and well-nourished. Cardiovascular: Normal rate and regular rhythm. Exam reveals no gallop and no friction rub. No murmur heard. Pulmonary/Chest: Effort normal and breath sounds normal.   Abdominal: Soft. Bowel sounds are normal. She exhibits no distension. There is no tenderness. No masses or hernias noted. Neurological: She is alert and oriented to person, place, and time. Skin: Skin is warm and dry. Psychiatric: She has a normal mood and affect. ASSESSMENT and PLAN    Assessment:     Morbid obesity with body mass index of 58. Co-morbids listed below      Plan:   1 & 2. Morbid Obesity and BMI- Patient is schedule for Gastric bypass with  on 3/28/2019 at [address] patient in regard to post diet restrictions, follow- up office visits, follow- up care. Patient as received educational booklet and vitamin list. Reviewed liver shrinking diet.  Post op medications prescribed: Zofran, Vit D, Lovenox and Neurontin. She will start the liver shrinking diet 3 weeks before surgery     Reviewed ERAS protocol: Take 1000mg of Tylenol with lunch and dinner the day before surgery. You may drink clear liquids up to 2 hours before surgery. Do NOT start medications prescribed until after surgery. 3 &4. GERd/PUD-takes Pepcid. 5. Hypothyroidism- Advised patient that her TSH was 5.16 this morning. She states that her PCP just increased her synthroid. 6. Vit D deficiency- Start Ergocalciferol. Pt verbalized understanding and questions were answered to the best of my knowledge and ability.              Signed By: Levy Godfrey NP     February 26, 2019

## 2019-02-27 ENCOUNTER — DOCUMENTATION ONLY (OUTPATIENT)
Dept: SURGERY | Age: 39
End: 2019-02-27

## 2019-02-28 ENCOUNTER — DOCUMENTATION ONLY (OUTPATIENT)
Dept: SURGERY | Age: 39
End: 2019-02-28

## 2019-02-28 LAB
BACTERIA SPEC CULT: NORMAL
CC UR VC: NORMAL
SERVICE CMNT-IMP: NORMAL

## 2019-02-28 NOTE — PROGRESS NOTES
PLEASE NOTE ABNORMAL LABS:   ABG   CRP: 1.09   WBC: 11.2   PLT: 435   TSH: 5.16   VIT D: 23.6        Roel Bae NP made aware of labs.

## 2019-02-28 NOTE — PERIOP NOTES
MESSAGE SENT TO JENS NUNES LPN REGARDING ABNORMAL LABS. PLEASE NOTE ABNORMAL LABS: 
ABG 
CRP: 1.09 
WBC: 11.2 PLT: 435 TSH: 5.16 
VIT D: 23.6

## 2019-03-07 ENCOUNTER — OFFICE VISIT (OUTPATIENT)
Dept: SURGERY | Age: 39
End: 2019-03-07

## 2019-03-07 VITALS
WEIGHT: 293 LBS | DIASTOLIC BLOOD PRESSURE: 96 MMHG | SYSTOLIC BLOOD PRESSURE: 146 MMHG | HEART RATE: 114 BPM | OXYGEN SATURATION: 96 % | HEIGHT: 68 IN | RESPIRATION RATE: 20 BRPM | BODY MASS INDEX: 44.41 KG/M2 | TEMPERATURE: 98.1 F

## 2019-03-07 DIAGNOSIS — E66.01 OBESITY, MORBID (HCC): Primary | ICD-10-CM

## 2019-03-07 DIAGNOSIS — K21.9 GASTROESOPHAGEAL REFLUX DISEASE WITHOUT ESOPHAGITIS: ICD-10-CM

## 2019-03-07 NOTE — PROGRESS NOTES
1. Have you been to the ER, urgent care clinic since your last visit? Hospitalized since your last visit? No    2. Have you seen or consulted any other health care providers outside of the [address] [place] [address] since your last visit? Include any pap smears or colon screening.  No

## 2019-03-08 NOTE — PATIENT INSTRUCTIONS
Learning About How to Prepare for Weight-Loss Surgery  How can you prepare for weight-loss surgery? Having weight-loss surgery (also called bariatric surgery) is a big step. You can prepare for surgery by having a plan. Your plan may include your goals for losing weight and how to makes changes in your diet, activity, and lifestyle to help raise your chances of success. One way to prepare for surgery is to think about your goal or reason why you want to reach a healthy weight. Do you want to lower your blood pressure, cholesterol, or blood sugar? Do you want to be able to sleep better, play with your kids, or walk around the block? Having a reason can help you stay with your plan and meet your goals. Your weight-loss surgery team can help you meet your goals and get ready for surgery. [facility] work with a team that's trained to help you lose weight and make healthy changes in your life. This team may include:  · A medical doctor or nurse to help manage your care and schedule tests before surgery. · A surgeon who specializes in weight-loss surgery. · A registered dietitian to help you plan meals and make changes in the way you eat. · An exercise specialist to help you be more active and get stronger. · A therapist or counselor to help you learn why you eat and teach you ways to deal with stress and your emotions. Your team will also be there to help you prepare for life after surgery. They will help you adjust to new ways of eating and changes to your body. How will weight-loss surgery affect your life? You have likely thought a lot about how surgery may affect your life--how you will eat, how your body will look, or how you will feel. Some people feel overwhelmed with these changes. But planning can help you prepare for the changes and meet your weight-loss goals. One important step in your plan is to learn about the ways surgery will affect your life. These may include:  · A slimmer you.  You probably will lose weight very quickly in the first few months after surgery. As time goes on, your weight loss will slow down. How much weight you lose depends on what type of surgery you had and how well your new eating and activity plans are working for you. · A new way of eating. Success in reaching and keeping a healthy weight depends on making lifelong changes in how you eat. After surgery, you raise your chances of success if you:  ? Eat just a few ounces of food at a time. ? Eat very slowly and chew your food to mush. ? Don't drink for 30 minutes before you eat, during your meal, and for 30 minutes after you eat. ? Are careful about drinking alcohol. ? Avoid foods that are high in fat or sugar. ? Take vitamin and mineral supplements. · A healthier you. Weight-loss surgery can have some real health benefits. Problems like diabetes, high blood pressure, and sleep apnea may go away--or at least become easier to manage. · A more active you. After surgery, being active on most days of the week will help you reach your weight goal and avoid gaining back the weight you lose. · A lot of extra skin. When you lose weight quickly, you may have a lot of extra skin. That's normal. You can have surgery to remove the extra skin if it bothers you. There are going to be some ups and downs while you get used to these changes. So another way to adjust is to identify who can help support you. Getting support from friends and family can help. And joining a support group for people who have had the surgery can be a big help too, because they know what you're going through. As you know, it's a big decision to have weight-loss surgery. But when you have a plan, you can focus on losing weight and living a healthier life. So what steps can you take to prepare for weight-loss surgery? Will you set some goals? Will you learn about how surgery can affect your life? How about asking family or friends for help?  Write out your plan. Then get ready. Where can you learn more? Go to http://arlene-joann.info/. Enter S436 in the search box to learn more about \"Learning About How to Prepare for Weight-Loss Surgery. \"  Current as of: June 25, 2018  Content Version: 11.9  © 8729-1583 [employer] (acquired by [employer]. Care instructions adapted under license by [employer] (which disclaims liability or warranty for this information). If you have questions about a medical condition or this instruction, always ask your healthcare professional. [address] any warranty or liability for your use of this information.

## 2019-03-08 NOTE — PROGRESS NOTES
Subjective: The patient is a 34 y.o. obese female scheduled for LRYGBP 3/28. Body mass index is 59.91 kg/m². Aleisha Betancur has tried multiple diets in her  lifetime most recently trying unsupervised diets during which she was able to lose small amounts of weight.     Bariatric comorbidities present are   Past Medical History:   Diagnosis Date    Adverse effect of anesthesia     WOKE UP DURING ENDOSCOPY WITH KETAMINE AND PATIENT REPORTS FEELING LIKE SHE WAS HALLUCINATING - BUT DID OK WITH PROPOFOL IN PAST WITH NO ISSUES PER PATIENT    Arthritis     BILATERAL KNEES- OA    Asthma     GERD (gastroesophageal reflux disease)     Hiatal hernia     Hypothyroid     Morbid obesity ([address]     PUD (peptic ulcer disease)     PRE ULCER -    Second hand smoke exposure        Patient Active Problem List    Diagnosis Date Noted    Obesity, morbid ([address] 05/15/2018    Normal labor 2015    Condyloma acuminata of vulva during pregnancy 2015    Papanicolaou smear of cervix with atypical squamous cells of undetermined significance (ASC-US) 2015    Cervical high risk human papillomavirus (HPV) DNA test positive 2015    Rubella non-immune status, antepartum 2015    Obesity in pregnancy with antepartum complication     Supervision of other high-risk pregnancy(V23.89) 2015     Past Medical History:   Diagnosis Date    Adverse effect of anesthesia     WOKE UP DURING ENDOSCOPY WITH KETAMINE AND PATIENT REPORTS FEELING LIKE SHE WAS HALLUCINATING - BUT DID OK WITH PROPOFOL IN PAST WITH NO ISSUES PER PATIENT    Arthritis     BILATERAL KNEES- OA    Asthma     GERD (gastroesophageal reflux disease)     Hiatal hernia     Hypothyroid     Morbid obesity ([address]     PUD (peptic ulcer disease)     PRE ULCER -    Second hand smoke exposure       Past Surgical History:   Procedure Laterality Date    HX  SECTION      HX ENDOSCOPY        Social History     Tobacco Use    Smoking status: Former Smoker     Packs/day: 1.00     Years: 15.00     Pack years: 15.00     Last attempt to quit: 2014     Years since quittin.3    Smokeless tobacco: Never Used   Substance Use Topics    Alcohol use: No      Family History   Problem Relation Age of Onset   [place]diriz[place] Prior Arthritis-rheumatoid Mother     COPD Mother     Diabetes Father     Stroke Father     Hypertension Father     Cancer Maternal Uncle     Diabetes Paternal Grandmother     Breast Cancer Neg Hx     Ovarian Cancer Neg Hx     Uterine Cancer Neg Hx       Prior to Admission medications    Medication Sig Start Date End Date Taking? Authorizing Provider   HYDROcodone-acetaminophen (NORCO)  mg tablet Take 1 Tab by mouth every six (6) hours as needed for Pain. Yes Provider, Historical   acetaminophen (TYLENOL EXTRA STRENGTH) 500 mg tablet Take 1,500 mg by mouth every six (6) hours as needed for Pain. Yes Other, MD Masha   etonogestrel (NEXPLANON) 68 mg impl by SubDERmal route. BIRTH CONTROL - 18 INSERTION DATE   Yes Other, MD Masha   levothyroxine (SYNTHROID) 125 mcg tablet Take 175 mcg by mouth Daily (before breakfast). Yes Other, MD Masha   famotidine (PEPCID) 20 mg tablet Take 20 mg by mouth daily. Yes Other, MD Masha   PROAIR HFA 90 mcg/actuation inhaler Take 2 Puffs by inhalation every four (4) hours as needed for Wheezing or Shortness of Breath. Every 4 to 6 hours as needed 3/4/16  Yes Provider, Historical   doxycycline (VIBRA-TABS) 100 mg tablet Take 100 mg by mouth daily. Provider, Historical   ondansetron (ZOFRAN ODT) 4 mg disintegrating tablet Take 1 Tab by mouth every eight (8) hours as needed for Nausea. 19   Skyler Nelson NP   ergocalciferol (ERGOCALCIFEROL) 50,000 unit capsule Take 1 Cap by mouth every seven (7) days. 19   Skyler Nelson NP   enoxaparin (LOVENOX) 40 mg/0.4 mL 0.4 mL by SubCUTAneous route daily.  3/28/19   Skyler Nelson NP   dicyclomine (BENTYL) 10 mg capsule Take 2 Caps by mouth four (4) times daily. Patient taking differently: Take 20 mg by mouth as needed. 7/21/18   Mariusz Villagomez MD   calcium carbonate-vitamin D3 600 mg(1,500mg) -800 unit tab TAKE 1 TABLET BY MOUTH TWICE A DAY 12/16/16   Provider, Historical     Allergies   Allergen Reactions    Percocet [Oxycodone-Acetaminophen] Nausea and Vomiting        Objective:     Visit Vitals  BP (!) 146/96   Pulse (!) 114   Temp 98.1 °F (36.7 °C)   Resp 20   Ht 5' 8\" (1.727 m)   Wt (!) 394 lb (178.7 kg)   SpO2 96%   BMI 59.91 kg/m²       Assessment:     Morbid obesity with comorbidities; no success with medical management. Plan:     laparoscopic gastric bypass surgery    All of her questions have been answered and she has signed a consent for this operation following review of the detailed informed consent document. She was counseled on the 3 week preoperative liver shrinking diet. The postoperative dietary changes and discharge information has been given to her in the form of a booklet. 25 minutes spent with patient greater than 50% of time in face-face consultation reviewing potential risks, anticipated hospital course, activity restrictions, post-op diet.       Signed By: Reese River MD     March 7, 2019

## 2019-03-09 ENCOUNTER — HOSPITAL ENCOUNTER (EMERGENCY)
Age: 39
Discharge: HOME | End: 2019-03-09
Attending: EMERGENCY MEDICINE | Admitting: EMERGENCY MEDICINE
Payer: MEDICAID

## 2019-03-09 ENCOUNTER — APPOINTMENT (OUTPATIENT)
Dept: GENERAL RADIOLOGY | Age: 39
End: 2019-03-09
Attending: NURSE PRACTITIONER
Payer: MEDICAID

## 2019-03-09 VITALS
OXYGEN SATURATION: 96 % | HEIGHT: 68 IN | DIASTOLIC BLOOD PRESSURE: 68 MMHG | TEMPERATURE: 99.3 F | SYSTOLIC BLOOD PRESSURE: 124 MMHG | RESPIRATION RATE: 20 BRPM | HEART RATE: 118 BPM | WEIGHT: 293 LBS | BODY MASS INDEX: 44.41 KG/M2

## 2019-03-09 DIAGNOSIS — J11.1 FLU SYNDROME: Primary | ICD-10-CM

## 2019-03-09 PROCEDURE — 99283 EMERGENCY DEPT VISIT LOW MDM: CPT

## 2019-03-09 PROCEDURE — 74011250636 HC RX REV CODE- 250/636: Performed by: NURSE PRACTITIONER

## 2019-03-09 PROCEDURE — 74011250637 HC RX REV CODE- 250/637: Performed by: NURSE PRACTITIONER

## 2019-03-09 PROCEDURE — 96372 THER/PROPH/DIAG INJ SC/IM: CPT

## 2019-03-09 PROCEDURE — 71046 X-RAY EXAM CHEST 2 VIEWS: CPT

## 2019-03-09 PROCEDURE — 74011000250 HC RX REV CODE- 250: Performed by: NURSE PRACTITIONER

## 2019-03-09 PROCEDURE — 94640 AIRWAY INHALATION TREATMENT: CPT

## 2019-03-09 PROCEDURE — 77030013140 HC MSK NEB VYRM -A

## 2019-03-09 RX ORDER — CODEINE PHOSPHATE AND GUAIFENESIN 10; 100 MG/5ML; MG/5ML
10 SOLUTION ORAL
Qty: 120 ML | Refills: 0 | Status: SHIPPED | OUTPATIENT
Start: 2019-03-09 | End: 2019-03-12

## 2019-03-09 RX ORDER — ALBUTEROL SULFATE 0.83 MG/ML
2.5 SOLUTION RESPIRATORY (INHALATION)
Qty: 24 EACH | Refills: 0 | Status: SHIPPED | OUTPATIENT
Start: 2019-03-09

## 2019-03-09 RX ORDER — GUAIFENESIN 100 MG/5ML
100 SOLUTION ORAL
Status: COMPLETED | OUTPATIENT
Start: 2019-03-09 | End: 2019-03-09

## 2019-03-09 RX ORDER — KETOROLAC TROMETHAMINE 30 MG/ML
60 INJECTION, SOLUTION INTRAMUSCULAR; INTRAVENOUS ONCE
Status: COMPLETED | OUTPATIENT
Start: 2019-03-09 | End: 2019-03-09

## 2019-03-09 RX ORDER — OSELTAMIVIR PHOSPHATE 75 MG/1
75 CAPSULE ORAL 2 TIMES DAILY
Qty: 10 CAP | Refills: 0 | Status: SHIPPED | OUTPATIENT
Start: 2019-03-09 | End: 2019-03-14

## 2019-03-09 RX ORDER — PREDNISONE 20 MG/1
40 TABLET ORAL DAILY
Qty: 10 TAB | Refills: 0 | Status: SHIPPED | OUTPATIENT
Start: 2019-03-09 | End: 2019-03-14

## 2019-03-09 RX ADMIN — KETOROLAC TROMETHAMINE 60 MG: 60 INJECTION, SOLUTION INTRAMUSCULAR at 20:28

## 2019-03-09 RX ADMIN — ALBUTEROL SULFATE 1 DOSE: 2.5 SOLUTION RESPIRATORY (INHALATION) at 20:29

## 2019-03-09 RX ADMIN — GUAIFENESIN 100 MG: 200 SOLUTION ORAL at 20:28

## 2019-03-10 NOTE — ED NOTES
Discharge note: The patient was discharged home in stable condition. The patient is alert and oriented, is in no respiratory distress and has vital signs within normal limits. The patient's diagnosis, condition and treatment were explained to patient by Charlette Gilman NP. The patient expressed understanding of discharge instructions, prescriptions, and plan of care. A discharge plan has been developed. A  was not involved in the process. Patient offered a wheelchair to ED lobby for discharge but declined at this time. Patient ambulated with a steady gate to ED lobby to go home.

## 2019-03-10 NOTE — ED PROVIDER NOTES
Pt is a 33 y/o female with a h/o asthma who presents today with c/o productive cough with brown sputum, severe bodyaches, temp 101.2, ST and bilateral earache that started yesterday. She has known contacts at home that are being treated for the flu. She is taking Tylenol and using her inhaler for her symtpoms but it is no improving. She did not get her flu shot this year. No other complaints.               Past Medical History:   Diagnosis Date    Adverse effect of anesthesia     WOKE UP DURING ENDOSCOPY WITH KETAMINE AND PATIENT REPORTS FEELING LIKE SHE WAS HALLUCINATING - BUT DID OK WITH PROPOFOL IN PAST WITH NO ISSUES PER PATIENT    Arthritis     BILATERAL KNEES- OA    Asthma     GERD (gastroesophageal reflux disease)     Hiatal hernia     Hypothyroid     Morbid obesity ([address]     PUD (peptic ulcer disease)     PRE ULCER -    Second hand smoke exposure        Past Surgical History:   Procedure Laterality Date    HX  SECTION      HX ENDOSCOPY           Family History:   Problem Relation Age of Onset   [facility] Arthritis-rheumatoid Mother     COPD Mother     Diabetes Father     Stroke Father     Hypertension Father     Cancer Maternal Uncle     Diabetes Paternal Grandmother     Breast Cancer Neg Hx     Ovarian Cancer Neg Hx     Uterine Cancer Neg Hx        Social History     Socioeconomic History    Marital status: SINGLE     Spouse name: Not on file    Number of children: Not on file    Years of education: Not on file    Highest education level: Not on file   Social Needs    Financial resource strain: Not on file    Food insecurity - worry: Not on file    Food insecurity - inability: Not on file   [employer] needs - medical: Not on file   [employer] needs - non-medical: Not on file   Occupational History    Not on file   Tobacco Use    Smoking status: Former Smoker     Packs/day: 1.00     Years: 15.00     Pack years: 15.00     Last attempt to quit: 2014     Years since quittin.3    Smokeless tobacco: Never Used   Substance and Sexual Activity    Alcohol use: No    Drug use: No    Sexual activity: No     Partners: Male   Other Topics Concern    Not on file   Social History Narrative    Not on file         ALLERGIES: Percocet [oxycodone-acetaminophen]    Review of Systems   Constitutional: Positive for chills and fever. Respiratory: Positive for cough, shortness of breath and wheezing. Cardiovascular: Negative for chest pain. Gastrointestinal: Negative for abdominal pain, diarrhea, nausea and vomiting. Genitourinary: Negative. Musculoskeletal: Positive for myalgias. Skin: Negative. All other systems reviewed and are negative. Vitals:    19   BP: 146/67   Pulse: (!) 118   Resp: 24   Temp: 99.3 °F (37.4 °C)   SpO2: 99%   Weight: (!) 178.7 kg (394 lb)   Height: 5' 8\" (1.727 m)            Physical Exam   Constitutional: She is oriented to person, place, and time. She appears well-developed and well-nourished. HENT:   Right Ear: A middle ear effusion is present. Left Ear: A middle ear effusion is present. Nose: Nose normal.   Mouth/Throat: Uvula is midline, oropharynx is clear and moist and mucous membranes are normal.   Tonsillar swelling mild with no erythema or exudate   Eyes: Conjunctivae are normal.   Neck: Neck supple. Cardiovascular: Normal rate, regular rhythm and normal heart sounds. No murmur heard. Pulmonary/Chest: Effort normal. No respiratory distress. She has wheezes (scattered end inspiratory wheezing). Abdominal: Soft. Musculoskeletal: Normal range of motion. Neurological: She is alert and oriented to person, place, and time. Skin: Skin is warm and dry. Psychiatric: She has a normal mood and affect. Her behavior is normal.   Nursing note and vitals reviewed.        MDM  Number of Diagnoses or Management Options  Flu syndrome:   Diagnosis management comments: Pt is a 35 y/o with a h/o of asthma who presents with flu like illness with known contacts. CXR negative for infectious process. She was given guiafenasin, toradol and duoneb fo rher cough an dsligh wheezing. Will plan to treat impirically with tamiflu since her symptoms started only yesterday, steroids and nebulizer solution and antitussive medication. She has vollow up for next week.           Procedures

## 2019-03-10 NOTE — ED TRIAGE NOTES
Yesterday, pt started with productive cough (medium brown) and fever (101.2 at home).  Sore throat and bilateral ear pain, generalized body aches

## 2019-03-20 ENCOUNTER — TELEPHONE (OUTPATIENT)
Dept: SURGERY | Age: 39
End: 2019-03-20

## 2019-05-28 ENCOUNTER — HOSPITAL ENCOUNTER (OUTPATIENT)
Dept: PREADMISSION TESTING | Age: 39
Discharge: HOME | End: 2019-05-28
Payer: MEDICAID

## 2019-05-28 ENCOUNTER — OFFICE VISIT (OUTPATIENT)
Dept: SURGERY | Age: 39
End: 2019-05-28

## 2019-05-28 VITALS
RESPIRATION RATE: 20 BRPM | HEIGHT: 68 IN | BODY MASS INDEX: 44.41 KG/M2 | SYSTOLIC BLOOD PRESSURE: 131 MMHG | WEIGHT: 293 LBS | TEMPERATURE: 98 F | HEART RATE: 90 BPM | DIASTOLIC BLOOD PRESSURE: 85 MMHG

## 2019-05-28 VITALS
HEART RATE: 90 BPM | OXYGEN SATURATION: 95 % | HEIGHT: 68 IN | RESPIRATION RATE: 20 BRPM | BODY MASS INDEX: 44.41 KG/M2 | DIASTOLIC BLOOD PRESSURE: 85 MMHG | WEIGHT: 293 LBS | TEMPERATURE: 98 F | SYSTOLIC BLOOD PRESSURE: 131 MMHG

## 2019-05-28 DIAGNOSIS — E03.9 HYPOTHYROIDISM, UNSPECIFIED TYPE: ICD-10-CM

## 2019-05-28 DIAGNOSIS — K27.9 PUD (PEPTIC ULCER DISEASE): ICD-10-CM

## 2019-05-28 DIAGNOSIS — E66.01 MORBID OBESITY WITH BMI OF 50.0-59.9, ADULT (HCC): Primary | ICD-10-CM

## 2019-05-28 DIAGNOSIS — K21.9 GASTROESOPHAGEAL REFLUX DISEASE WITHOUT ESOPHAGITIS: ICD-10-CM

## 2019-05-28 DIAGNOSIS — E55.9 VITAMIN D DEFICIENCY: ICD-10-CM

## 2019-05-28 LAB
ALBUMIN SERPL-MCNC: 3.6 G/DL (ref 3.5–5)
ALBUMIN/GLOB SERPL: 0.9 {RATIO} (ref 1.1–2.2)
ALP SERPL-CCNC: 89 U/L (ref 45–117)
ALT SERPL-CCNC: 24 U/L (ref 12–78)
ANION GAP SERPL CALC-SCNC: 6 MMOL/L (ref 5–15)
AST SERPL-CCNC: 10 U/L (ref 15–37)
BASOPHILS # BLD: 0.1 K/UL (ref 0–0.1)
BASOPHILS NFR BLD: 1 % (ref 0–1)
BILIRUB SERPL-MCNC: 0.5 MG/DL (ref 0.2–1)
BUN SERPL-MCNC: 17 MG/DL (ref 6–20)
BUN/CREAT SERPL: 22 (ref 12–20)
CALCIUM SERPL-MCNC: 9.1 MG/DL (ref 8.5–10.1)
CHLORIDE SERPL-SCNC: 109 MMOL/L (ref 97–108)
CO2 SERPL-SCNC: 26 MMOL/L (ref 21–32)
CREAT SERPL-MCNC: 0.77 MG/DL (ref 0.55–1.02)
DIFFERENTIAL METHOD BLD: ABNORMAL
EOSINOPHIL # BLD: 0.4 K/UL (ref 0–0.4)
EOSINOPHIL NFR BLD: 4 % (ref 0–7)
ERYTHROCYTE [DISTWIDTH] IN BLOOD BY AUTOMATED COUNT: 15 % (ref 11.5–14.5)
GLOBULIN SER CALC-MCNC: 3.9 G/DL (ref 2–4)
GLUCOSE SERPL-MCNC: 84 MG/DL (ref 65–100)
HCT VFR BLD AUTO: 42.7 % (ref 35–47)
HGB BLD-MCNC: 13.1 G/DL (ref 11.5–16)
IMM GRANULOCYTES # BLD AUTO: 0 K/UL (ref 0–0.04)
IMM GRANULOCYTES NFR BLD AUTO: 0 % (ref 0–0.5)
LYMPHOCYTES # BLD: 1.8 K/UL (ref 0.8–3.5)
LYMPHOCYTES NFR BLD: 20 % (ref 12–49)
MAGNESIUM SERPL-MCNC: 2.4 MG/DL (ref 1.6–2.4)
MCH RBC QN AUTO: 28.2 PG (ref 26–34)
MCHC RBC AUTO-ENTMCNC: 30.7 G/DL (ref 30–36.5)
MCV RBC AUTO: 91.8 FL (ref 80–99)
MONOCYTES # BLD: 0.6 K/UL (ref 0–1)
MONOCYTES NFR BLD: 6 % (ref 5–13)
NEUTS SEG # BLD: 6.3 K/UL (ref 1.8–8)
NEUTS SEG NFR BLD: 69 % (ref 32–75)
NRBC # BLD: 0 K/UL (ref 0–0.01)
NRBC BLD-RTO: 0 PER 100 WBC
PLATELET # BLD AUTO: 421 K/UL (ref 150–400)
PMV BLD AUTO: 9 FL (ref 8.9–12.9)
POTASSIUM SERPL-SCNC: 3.8 MMOL/L (ref 3.5–5.1)
PROT SERPL-MCNC: 7.5 G/DL (ref 6.4–8.2)
RBC # BLD AUTO: 4.65 M/UL (ref 3.8–5.2)
SODIUM SERPL-SCNC: 141 MMOL/L (ref 136–145)
WBC # BLD AUTO: 9.1 K/UL (ref 3.6–11)

## 2019-05-28 PROCEDURE — 80053 COMPREHEN METABOLIC PANEL: CPT

## 2019-05-28 PROCEDURE — 85025 COMPLETE CBC W/AUTO DIFF WBC: CPT

## 2019-05-28 PROCEDURE — 83735 ASSAY OF MAGNESIUM: CPT

## 2019-05-28 PROCEDURE — 36415 COLL VENOUS BLD VENIPUNCTURE: CPT

## 2019-05-28 RX ORDER — FLUTICASONE FUROATE AND VILANTEROL 100; 25 UG/1; UG/1
1 POWDER RESPIRATORY (INHALATION) DAILY
COMMUNITY

## 2019-05-28 RX ORDER — LEVOTHYROXINE SODIUM 175 UG/1
175 TABLET ORAL
COMMUNITY
End: 2020-07-31

## 2019-05-28 NOTE — PERIOP NOTES
Patient given surgical site infection FAQ handout and CHG liquid Preop instructions reviewed and patient verbalizes understanding of instructions. Patient has been given the opportunity to ask additional questions.

## 2019-05-28 NOTE — PROGRESS NOTES
1. Have you been to the ER, urgent care clinic since your last visit? Hospitalized since your last visit? no    2. Have you seen or consulted any other health care providers outside of the [address] [place] [address] since your last visit? Include any pap smears or colon screening.  no

## 2019-05-28 NOTE — PROGRESS NOTES
HISTORY OF PRESENT ILLNESS  History and Physical.     The patient is a 34 y.o. obese female here for history and physical for Gastric bypass surgery. Body mass index is 59.6 kg/m². Visit Vitals  /85   Pulse 90   Temp 98 °F (36.7 °C) (Oral)   Resp 20   Ht 5' 8\" (1.727 m)   Wt (!) 392 lb (177.8 kg)   LMP 2019 (Approximate)   SpO2 95%   BMI 59.60 kg/m²         Patient has an advanced directive:  Not on file. Ms. Amaris Adame has a reminder for a \"due or due soon\" health maintenance. I have asked that she contact her primary care provider for follow-up on this health maintenance.       Patient Active Problem List    Diagnosis Date Noted    Obesity, morbid ([address] 05/15/2018    Normal labor 2015    Condyloma acuminata of vulva during pregnancy 2015    Papanicolaou smear of cervix with atypical squamous cells of undetermined significance (ASC-US) 2015    Cervical high risk human papillomavirus (HPV) DNA test positive 2015    Rubella non-immune status, antepartum 2015    Obesity in pregnancy with antepartum complication     Supervision of other high-risk pregnancy(V23.89) 2015     Past Medical History:   Diagnosis Date    Adverse effect of anesthesia     WOKE UP DURING ENDOSCOPY WITH KETAMINE AND PATIENT REPORTS FEELING LIKE SHE WAS HALLUCINATING - BUT DID OK WITH PROPOFOL IN PAST WITH NO ISSUES PER PATIENT    Arthritis     BILATERAL KNEES- OA    Asthma     Chronic pain     KNEES    GERD (gastroesophageal reflux disease)     Hiatal hernia     Hypothyroid     Morbid obesity ([address]     PUD (peptic ulcer disease)     PRE ULCER -    Second hand smoke exposure       Past Surgical History:   Procedure Laterality Date    HX  SECTION      HX ENDOSCOPY      HX GI  2016 2018    ENDOSCOPY      Social History     Tobacco Use    Smoking status: Former Smoker     Packs/day: 1.00     Years: 15.00     Pack years: 15.00     Last attempt to quit: 2014     Years since quittin.5    Smokeless tobacco: Never Used   Substance Use Topics    Alcohol use: Yes     Comment: OCCASIONALLY      Family History   Problem Relation Age of Onset   [place] Arthritis-rheumatoid Mother     COPD Mother     Diabetes Father     Stroke Father     Hypertension Father     Cancer Maternal Uncle     Diabetes Paternal Grandmother     No Known Problems Brother     Breast Cancer Neg Hx     Ovarian Cancer Neg Hx     Uterine Cancer Neg Hx     Anesth Problems Neg Hx       Prior to Admission medications    Medication Sig Start Date End Date Taking? Authorizing Provider   levothyroxine (SYNTHROID) 175 mcg tablet Take 175 mcg by mouth Daily (before breakfast). Yes Provider, Historical   fluticasone furoate-vilanterol (BREO ELLIPTA) 100-25 mcg/dose inhaler Take 1 Puff by inhalation daily. Indications: AT 3PM   Yes Provider, Historical   albuterol (PROVENTIL VENTOLIN) 2.5 mg /3 mL (0.083 %) nebulizer solution 3 mL by Nebulization route every four (4) hours as needed for Wheezing. 3/9/19  Yes Roxanne Benson, NP   doxycycline (VIBRA-TABS) 100 mg tablet Take 100 mg by mouth as needed (ACNE). Yes Provider, Historical   HYDROcodone-acetaminophen (NORCO)  mg tablet Take 1 Tab by mouth every six (6) hours as needed for Pain. Yes Provider, Historical   acetaminophen (TYLENOL EXTRA STRENGTH) 500 mg tablet Take 1,500 mg by mouth every six (6) hours as needed for Pain. Yes Other, MD Masha   etonogestrel (NEXPLANON) 68 mg impl by SubDERmal route. BIRTH CONTROL - 18 INSERTION DATE   Yes Other, MD Masha   famotidine (PEPCID) 20 mg tablet Take 20 mg by mouth daily. Yes Other, MD Masha   PROAIR HFA 90 mcg/actuation inhaler Take 2 Puffs by inhalation every four (4) hours as needed for Wheezing or Shortness of Breath.  Every 4 to 6 hours as needed 3/4/16  Yes Provider, Historical   ondansetron (ZOFRAN ODT) 4 mg disintegrating tablet Take 1 Tab by mouth every eight (8) hours as needed for Nausea. 2/26/19   Manual Pronto, NP   ergocalciferol (ERGOCALCIFEROL) 50,000 unit capsule Take 1 Cap by mouth every seven (7) days. 2/26/19   Manual Pronto, NP   enoxaparin (LOVENOX) 40 mg/0.4 mL 0.4 mL by SubCUTAneous route daily. 3/28/19   Manual Pronto, NP     Allergies   Allergen Reactions    Percocet [Oxycodone-Acetaminophen] Nausea and Vomiting       HPI    Review of Systems   Constitutional: Negative for chills, fever and malaise/fatigue. HENT: Negative for congestion, ear pain, hearing loss, sinus pain, sore throat and tinnitus. Eyes: Negative for blurred vision, double vision, pain, discharge and redness. Wears glasses   Respiratory: Positive for cough, shortness of breath and wheezing. Hx of asthma, former smoker   Cardiovascular: Negative for chest pain, palpitations and leg swelling. Gastrointestinal: Negative for abdominal pain, constipation, diarrhea, heartburn, nausea and vomiting. Genitourinary: Negative for dysuria and urgency. Musculoskeletal: Positive for joint pain (knee pain). Negative for back pain and neck pain. Skin: Negative for itching and rash. Neurological: Negative for dizziness, seizures and headaches. Psychiatric/Behavioral: Negative for depression. Physical Exam   Constitutional: She is oriented to person, place, and time. She appears well-developed and well-nourished. Cardiovascular: Normal rate and regular rhythm. Exam reveals no gallop and no friction rub. No murmur heard. Pulmonary/Chest: Effort normal and breath sounds normal.   Abdominal: Soft. Bowel sounds are normal. She exhibits no distension. There is no tenderness. No masses or hernias noted. Neurological: She is alert and oriented to person, place, and time. Skin: Skin is warm and dry. Psychiatric: She has a normal mood and affect. ASSESSMENT and PLAN     Morbid obesity with body mass index of 58. Co-morbids listed below          ICD-10-CM ICD-9-CM    1. Morbid obesity with BMI of 50.0-59.9, adult ([facility]) E66.01 278.01     Z68.43 V85.43    2. BMI 50.0-59.9, adult [facility] Z68.43 V85.43    3. Gastroesophageal reflux disease without esophagitis K21.9 530.81    4. PUD (peptic ulcer disease) K27.9 533.90    5. Hypothyroidism, unspecified type E03.9 244.9    6. Vitamin D deficiency E55.9 268.9        Plan:   1 & 2. Morbid Obesity and BMI- Patient is schedule for Gastric bypass with  on 6/12/2019at [address] patient in regard to post diet restrictions, follow- up office visits, follow- up care. Patient as received educational booklet and vitamin list. Reviewed liver shrinking diet. Post op medications prescribed: Zofran, Vit D, Lovenox and Neurontin. She will start the liver shrinking diet 3 weeks before surgery     Reviewed ERAS protocol: Take 1000mg of Tylenol with lunch and dinner the day before surgery. You may drink clear liquids up to 2 hours before surgery. Do NOT start medications prescribed until after surgery. 3 &4. GERd/PUD-takes Pepcid. 5. Hypothyroidism- Her thyroid medication was increased after her last visit. 6. Vit D deficiency- Start Ergocalciferol.      Pt verbalized understanding and questions were answered to the best of my knowledge and ability.

## 2019-05-28 NOTE — PATIENT INSTRUCTIONS
Learning About How to Prepare for Weight-Loss Surgery  How can you prepare for weight-loss surgery? Having weight-loss surgery (also called bariatric surgery) is a big step. You can prepare for surgery by having a plan. Your plan may include your goals for losing weight and how to makes changes in your diet, activity, and lifestyle to help raise your chances of success. One way to prepare for surgery is to think about your goal or reason why you want to reach a healthy weight. Do you want to lower your blood pressure, cholesterol, or blood sugar? Do you want to be able to sleep better, play with your kids, or walk around the block? Having a reason can help you stay with your plan and meet your goals. Your weight-loss surgery team can help you meet your goals and get ready for surgery. Ida Sweeney work with a team that's trained to help you lose weight and make healthy changes in your life. This team may include:  · A medical doctor or nurse to help manage your care and schedule tests before surgery. · A surgeon who specializes in weight-loss surgery. · A registered dietitian to help you plan meals and make changes in the way you eat. · An exercise specialist to help you be more active and get stronger. · A therapist or counselor to help you learn why you eat and teach you ways to deal with stress and your emotions. Your team will also be there to help you prepare for life after surgery. They will help you adjust to new ways of eating and changes to your body. How will weight-loss surgery affect your life? You have likely thought a lot about how surgery may affect your life--how you will eat, how your body will look, or how you will feel. Some people feel overwhelmed with these changes. But planning can help you prepare for the changes and meet your weight-loss goals. One important step in your plan is to learn about the ways surgery will affect your life. These may include:  · A slimmer you.  You probably will lose weight very quickly in the first few months after surgery. As time goes on, your weight loss will slow down. How much weight you lose depends on what type of surgery you had and how well your new eating and activity plans are working for you. · A new way of eating. Success in reaching and keeping a healthy weight depends on making lifelong changes in how you eat. After surgery, you raise your chances of success if you:  ? Eat just a few ounces of food at a time. ? Eat very slowly and chew your food to mush. ? Don't drink for 30 minutes before you eat, during your meal, and for 30 minutes after you eat. ? Are careful about drinking alcohol. ? Avoid foods that are high in fat or sugar. ? Take vitamin and mineral supplements. · A healthier you. Weight-loss surgery can have some real health benefits. Problems like diabetes, high blood pressure, and sleep apnea may go away--or at least become easier to manage. · A more active you. After surgery, being active on most days of the week will help you reach your weight goal and avoid gaining back the weight you lose. · A lot of extra skin. When you lose weight quickly, you may have a lot of extra skin. That's normal. You can have surgery to remove the extra skin if it bothers you. There are going to be some ups and downs while you get used to these changes. So another way to adjust is to identify who can help support you. Getting support from friends and family can help. And joining a support group for people who have had the surgery can be a big help too, because they know what you're going through. As you know, it's a big decision to have weight-loss surgery. But when you have a plan, you can focus on losing weight and living a healthier life. So what steps can you take to prepare for weight-loss surgery? Will you set some goals? Will you learn about how surgery can affect your life? How about asking family or friends for help?  Write out your plan. Then get ready. Where can you learn more? Go to http://arlene-joann.info/. Enter D133 in the search box to learn more about \"Learning About How to Prepare for Weight-Loss Surgery. \"  Current as of: June 25, 2018  Content Version: 11.9  © 9924-5952 [employer]. Care instructions adapted under license by [employer] (which disclaims liability or warranty for this information). If you have questions about a medical condition or this instruction, always ask your healthcare professional. [address] any warranty or liability for your use of this information.

## 2019-05-29 ENCOUNTER — TELEPHONE (OUTPATIENT)
Dept: SURGERY | Age: 39
End: 2019-05-29

## 2019-05-29 NOTE — TELEPHONE ENCOUNTER
I called the patient and she said someone called from our office but did not leave a message, I checked with Naina Luo NP and she said one of our PSR's called her this morning to reschedule one of her post op appointments. PSR will call her back now.

## 2019-06-04 ENCOUNTER — TELEPHONE (OUTPATIENT)
Dept: SURGERY | Age: 39
End: 2019-06-04

## 2019-06-04 NOTE — TELEPHONE ENCOUNTER
I called the patient after speaking to Dr Keerthi Pineda and I let her know it was okay to take the Cortisone injections prior to her surgery. Pt in agreement.

## 2019-06-04 NOTE — TELEPHONE ENCOUNTER
Please call pt back regarding a Cortizone shot pt wants to know if she can take the shot before surgery or should she take it after surgery.

## 2019-06-11 ENCOUNTER — ANESTHESIA EVENT (OUTPATIENT)
Dept: SURGERY | Age: 39
DRG: 403 | End: 2019-06-11
Payer: MEDICAID

## 2019-06-12 ENCOUNTER — HOSPITAL ENCOUNTER (INPATIENT)
Age: 39
LOS: 2 days | Discharge: HOME | DRG: 403 | End: 2019-06-14
Attending: SURGERY | Admitting: SURGERY
Payer: MEDICAID

## 2019-06-12 ENCOUNTER — ANESTHESIA (OUTPATIENT)
Dept: SURGERY | Age: 39
DRG: 403 | End: 2019-06-12
Payer: MEDICAID

## 2019-06-12 DIAGNOSIS — Z98.84 S/P GASTRIC BYPASS: Primary | ICD-10-CM

## 2019-06-12 PROBLEM — E66.01 MORBID OBESITY (HCC): Status: ACTIVE | Noted: 2019-06-12

## 2019-06-12 LAB — HCG UR QL: NEGATIVE

## 2019-06-12 PROCEDURE — 77030002916 HC SUT ETHLN J&J -A: Performed by: SURGERY

## 2019-06-12 PROCEDURE — 94664 DEMO&/EVAL PT USE INHALER: CPT

## 2019-06-12 PROCEDURE — 74011000250 HC RX REV CODE- 250: Performed by: SURGERY

## 2019-06-12 PROCEDURE — 0D164ZA BYPASS STOMACH TO JEJUNUM, PERCUTANEOUS ENDOSCOPIC APPROACH: ICD-10-PCS | Performed by: SURGERY

## 2019-06-12 PROCEDURE — 76210000016 HC OR PH I REC 1 TO 1.5 HR: Performed by: SURGERY

## 2019-06-12 PROCEDURE — 77030020263 HC SOL INJ SOD CL0.9% LFCR 1000ML: Performed by: SURGERY

## 2019-06-12 PROCEDURE — 77030034154 HC SHR COAG HARM ACE J&J -F: Performed by: SURGERY

## 2019-06-12 PROCEDURE — 77030009852 HC PCH RTVR ENDOSC COVD -B: Performed by: SURGERY

## 2019-06-12 PROCEDURE — 0DJ08ZZ INSPECTION OF UPPER INTESTINAL TRACT, VIA NATURAL OR ARTIFICIAL OPENING ENDOSCOPIC: ICD-10-PCS | Performed by: SURGERY

## 2019-06-12 PROCEDURE — 77030008437 HC REINF STRP REINF SEMGD WLGO -C: Performed by: SURGERY

## 2019-06-12 PROCEDURE — 74011250636 HC RX REV CODE- 250/636

## 2019-06-12 PROCEDURE — 77030008684 HC TU ET CUF COVD -B: Performed by: ANESTHESIOLOGY

## 2019-06-12 PROCEDURE — 77030035051: Performed by: SURGERY

## 2019-06-12 PROCEDURE — 81025 URINE PREGNANCY TEST: CPT

## 2019-06-12 PROCEDURE — 77030034821 HC DEV ENDOSC DST ORVIL COVD -C: Performed by: SURGERY

## 2019-06-12 PROCEDURE — 76060000036 HC ANESTHESIA 2.5 TO 3 HR: Performed by: SURGERY

## 2019-06-12 PROCEDURE — 77030020747 HC TU INSUF ENDOSC TELE -A: Performed by: SURGERY

## 2019-06-12 PROCEDURE — 74011250636 HC RX REV CODE- 250/636: Performed by: ANESTHESIOLOGY

## 2019-06-12 PROCEDURE — 77030035042 HC TRCR ENDOSC OPTCL BLDLSS COVD -D: Performed by: SURGERY

## 2019-06-12 PROCEDURE — 74011000250 HC RX REV CODE- 250

## 2019-06-12 PROCEDURE — 76010000132 HC OR TIME 2.5 TO 3 HR: Performed by: SURGERY

## 2019-06-12 PROCEDURE — 77030037032 HC INSRT SCIS CLICKLLINE DISP STOR -B: Performed by: SURGERY

## 2019-06-12 PROCEDURE — 77030012407 HC DRN WND BARD -B: Performed by: SURGERY

## 2019-06-12 PROCEDURE — 74011250637 HC RX REV CODE- 250/637: Performed by: SURGERY

## 2019-06-12 PROCEDURE — 74011000258 HC RX REV CODE- 258: Performed by: SURGERY

## 2019-06-12 PROCEDURE — 77030026438 HC STYL ET INTUB CARD -A: Performed by: ANESTHESIOLOGY

## 2019-06-12 PROCEDURE — 77030018836 HC SOL IRR NACL ICUM -A: Performed by: SURGERY

## 2019-06-12 PROCEDURE — 77030016151 HC PROTCTR LNS DFOG COVD -B: Performed by: SURGERY

## 2019-06-12 PROCEDURE — 77030022764 HC INTRO ENDOSC EEA COVD -B: Performed by: SURGERY

## 2019-06-12 PROCEDURE — 77030039266 HC ADH SKN EXOFIN S2SG -A: Performed by: SURGERY

## 2019-06-12 PROCEDURE — 77030031139 HC SUT VCRL2 J&J -A: Performed by: SURGERY

## 2019-06-12 PROCEDURE — 77030008023 HC RELD SUT ENDOSC COVD -B: Performed by: SURGERY

## 2019-06-12 PROCEDURE — 77030002895 HC DEV VASC CLOSR COVD -B: Performed by: SURGERY

## 2019-06-12 PROCEDURE — 77030020782 HC GWN BAIR PAWS FLX 3M -B

## 2019-06-12 PROCEDURE — 77030020053 HC ELECTRD LAPSCP COVD -B: Performed by: SURGERY

## 2019-06-12 PROCEDURE — 77030011640 HC PAD GRND REM COVD -A: Performed by: SURGERY

## 2019-06-12 PROCEDURE — 77030009965 HC RELD STPLR ENDOS COVD -D: Performed by: SURGERY

## 2019-06-12 PROCEDURE — 77030027138 HC INCENT SPIROMETER -A

## 2019-06-12 PROCEDURE — 77030009956 HC RELD ENDOSTCH COVD -B: Performed by: SURGERY

## 2019-06-12 PROCEDURE — 77030029684 HC NEB SM VOL KT MONA -A

## 2019-06-12 PROCEDURE — 65660000000 HC RM CCU STEPDOWN

## 2019-06-12 PROCEDURE — 77030032490 HC SLV COMPR SCD KNE COVD -B: Performed by: SURGERY

## 2019-06-12 PROCEDURE — 77030002933 HC SUT MCRYL J&J -A: Performed by: SURGERY

## 2019-06-12 PROCEDURE — 77030008728 HC TU GAST LAPSCP APOL -C: Performed by: SURGERY

## 2019-06-12 PROCEDURE — 77030035045 HC TRCR ENDOSC VRSPRT BLDLSS COVD -B: Performed by: SURGERY

## 2019-06-12 PROCEDURE — 74011250636 HC RX REV CODE- 250/636: Performed by: SURGERY

## 2019-06-12 PROCEDURE — 77030030826 HC RETRCTR WND ALEXS AMR -B: Performed by: SURGERY

## 2019-06-12 PROCEDURE — 77030035048 HC TRCR ENDOSC OPTCL COVD -B: Performed by: SURGERY

## 2019-06-12 PROCEDURE — 77030038157 HC DEV PWR CNTR DISP SIGNIA COVD -C: Performed by: SURGERY

## 2019-06-12 PROCEDURE — 94640 AIRWAY INHALATION TREATMENT: CPT

## 2019-06-12 PROCEDURE — 77030008756 HC TU IRR SUC STRY -B: Performed by: SURGERY

## 2019-06-12 PROCEDURE — 77030018846 HC SOL IRR STRL H20 ICUM -A: Performed by: SURGERY

## 2019-06-12 PROCEDURE — 77030009957 HC RELD ENDOSTCH COVD -C: Performed by: SURGERY

## 2019-06-12 PROCEDURE — 77030010286 HC STPLR ENDOSC COVD -D: Performed by: SURGERY

## 2019-06-12 PROCEDURE — 77030013567 HC DRN WND RESERV BARD -A: Performed by: SURGERY

## 2019-06-12 PROCEDURE — 77030032435: Performed by: SURGERY

## 2019-06-12 PROCEDURE — 88300 SURGICAL PATH GROSS: CPT

## 2019-06-12 RX ORDER — SUCCINYLCHOLINE CHLORIDE 20 MG/ML
INJECTION INTRAMUSCULAR; INTRAVENOUS AS NEEDED
Status: DISCONTINUED | OUTPATIENT
Start: 2019-06-12 | End: 2019-06-12 | Stop reason: HOSPADM

## 2019-06-12 RX ORDER — SODIUM CHLORIDE 0.9 % (FLUSH) 0.9 %
5-40 SYRINGE (ML) INJECTION AS NEEDED
Status: DISCONTINUED | OUTPATIENT
Start: 2019-06-12 | End: 2019-06-14 | Stop reason: HOSPADM

## 2019-06-12 RX ORDER — MAGNESIUM SULFATE HEPTAHYDRATE 40 MG/ML
INJECTION, SOLUTION INTRAVENOUS AS NEEDED
Status: DISCONTINUED | OUTPATIENT
Start: 2019-06-12 | End: 2019-06-12 | Stop reason: HOSPADM

## 2019-06-12 RX ORDER — KETAMINE HYDROCHLORIDE 10 MG/ML
INJECTION, SOLUTION INTRAMUSCULAR; INTRAVENOUS AS NEEDED
Status: DISCONTINUED | OUTPATIENT
Start: 2019-06-12 | End: 2019-06-12 | Stop reason: HOSPADM

## 2019-06-12 RX ORDER — FENTANYL CITRATE 50 UG/ML
50 INJECTION, SOLUTION INTRAMUSCULAR; INTRAVENOUS AS NEEDED
Status: DISCONTINUED | OUTPATIENT
Start: 2019-06-12 | End: 2019-06-12 | Stop reason: HOSPADM

## 2019-06-12 RX ORDER — MIDAZOLAM HYDROCHLORIDE 1 MG/ML
0.5 INJECTION, SOLUTION INTRAMUSCULAR; INTRAVENOUS
Status: DISCONTINUED | OUTPATIENT
Start: 2019-06-12 | End: 2019-06-12 | Stop reason: HOSPADM

## 2019-06-12 RX ORDER — BUDESONIDE 0.5 MG/2ML
500 INHALANT ORAL
Status: DISCONTINUED | OUTPATIENT
Start: 2019-06-12 | End: 2019-06-14 | Stop reason: HOSPADM

## 2019-06-12 RX ORDER — FENTANYL CITRATE 50 UG/ML
25 INJECTION, SOLUTION INTRAMUSCULAR; INTRAVENOUS
Status: DISCONTINUED | OUTPATIENT
Start: 2019-06-12 | End: 2019-06-12 | Stop reason: HOSPADM

## 2019-06-12 RX ORDER — GLYCOPYRROLATE 0.2 MG/ML
0.2 INJECTION INTRAMUSCULAR; INTRAVENOUS
Status: DISCONTINUED | OUTPATIENT
Start: 2019-06-12 | End: 2019-06-12 | Stop reason: HOSPADM

## 2019-06-12 RX ORDER — SODIUM CHLORIDE 9 MG/ML
25 INJECTION, SOLUTION INTRAVENOUS CONTINUOUS
Status: DISCONTINUED | OUTPATIENT
Start: 2019-06-12 | End: 2019-06-12 | Stop reason: HOSPADM

## 2019-06-12 RX ORDER — SODIUM CHLORIDE 0.9 % (FLUSH) 0.9 %
5-40 SYRINGE (ML) INJECTION EVERY 8 HOURS
Status: DISCONTINUED | OUTPATIENT
Start: 2019-06-12 | End: 2019-06-14 | Stop reason: HOSPADM

## 2019-06-12 RX ORDER — ONDANSETRON 2 MG/ML
INJECTION INTRAMUSCULAR; INTRAVENOUS AS NEEDED
Status: DISCONTINUED | OUTPATIENT
Start: 2019-06-12 | End: 2019-06-12 | Stop reason: HOSPADM

## 2019-06-12 RX ORDER — SODIUM CHLORIDE 0.9 % (FLUSH) 0.9 %
5-40 SYRINGE (ML) INJECTION EVERY 8 HOURS
Status: DISCONTINUED | OUTPATIENT
Start: 2019-06-12 | End: 2019-06-12 | Stop reason: HOSPADM

## 2019-06-12 RX ORDER — ENOXAPARIN SODIUM 100 MG/ML
40 INJECTION SUBCUTANEOUS EVERY 24 HOURS
Status: DISCONTINUED | OUTPATIENT
Start: 2019-06-13 | End: 2019-06-14 | Stop reason: HOSPADM

## 2019-06-12 RX ORDER — ONDANSETRON 2 MG/ML
4 INJECTION INTRAMUSCULAR; INTRAVENOUS AS NEEDED
Status: DISCONTINUED | OUTPATIENT
Start: 2019-06-12 | End: 2019-06-12 | Stop reason: HOSPADM

## 2019-06-12 RX ORDER — ENOXAPARIN SODIUM 100 MG/ML
40 INJECTION SUBCUTANEOUS
Status: COMPLETED | OUTPATIENT
Start: 2019-06-12 | End: 2019-06-12

## 2019-06-12 RX ORDER — ROPIVACAINE HYDROCHLORIDE 5 MG/ML
30 INJECTION, SOLUTION EPIDURAL; INFILTRATION; PERINEURAL AS NEEDED
Status: DISCONTINUED | OUTPATIENT
Start: 2019-06-12 | End: 2019-06-12 | Stop reason: HOSPADM

## 2019-06-12 RX ORDER — LIDOCAINE HYDROCHLORIDE ANHYDROUS AND DEXTROSE MONOHYDRATE .8; 5 G/100ML; G/100ML
INJECTION, SOLUTION INTRAVENOUS
Status: DISCONTINUED | OUTPATIENT
Start: 2019-06-12 | End: 2019-06-12 | Stop reason: HOSPADM

## 2019-06-12 RX ORDER — SODIUM CHLORIDE, SODIUM LACTATE, POTASSIUM CHLORIDE, CALCIUM CHLORIDE 600; 310; 30; 20 MG/100ML; MG/100ML; MG/100ML; MG/100ML
1000 INJECTION, SOLUTION INTRAVENOUS CONTINUOUS
Status: DISCONTINUED | OUTPATIENT
Start: 2019-06-12 | End: 2019-06-12 | Stop reason: HOSPADM

## 2019-06-12 RX ORDER — ALBUTEROL SULFATE 0.83 MG/ML
2.5 SOLUTION RESPIRATORY (INHALATION) AS NEEDED
Status: DISCONTINUED | OUTPATIENT
Start: 2019-06-12 | End: 2019-06-12 | Stop reason: HOSPADM

## 2019-06-12 RX ORDER — HYOSCYAMINE SULFATE 0.12 MG/1
0.12 TABLET SUBLINGUAL
Status: DISCONTINUED | OUTPATIENT
Start: 2019-06-12 | End: 2019-06-14 | Stop reason: HOSPADM

## 2019-06-12 RX ORDER — HYDROMORPHONE HYDROCHLORIDE 2 MG/1
4 TABLET ORAL
Status: DISCONTINUED | OUTPATIENT
Start: 2019-06-12 | End: 2019-06-14 | Stop reason: HOSPADM

## 2019-06-12 RX ORDER — BUPIVACAINE HYDROCHLORIDE 5 MG/ML
50 INJECTION, SOLUTION EPIDURAL; INTRACAUDAL ONCE
Status: COMPLETED | OUTPATIENT
Start: 2019-06-12 | End: 2019-06-12

## 2019-06-12 RX ORDER — MIDAZOLAM HYDROCHLORIDE 1 MG/ML
1 INJECTION, SOLUTION INTRAMUSCULAR; INTRAVENOUS AS NEEDED
Status: DISCONTINUED | OUTPATIENT
Start: 2019-06-12 | End: 2019-06-12 | Stop reason: HOSPADM

## 2019-06-12 RX ORDER — ONDANSETRON 2 MG/ML
4 INJECTION INTRAMUSCULAR; INTRAVENOUS
Status: DISCONTINUED | OUTPATIENT
Start: 2019-06-12 | End: 2019-06-14 | Stop reason: HOSPADM

## 2019-06-12 RX ORDER — DEXAMETHASONE SODIUM PHOSPHATE 4 MG/ML
INJECTION, SOLUTION INTRA-ARTICULAR; INTRALESIONAL; INTRAMUSCULAR; INTRAVENOUS; SOFT TISSUE AS NEEDED
Status: DISCONTINUED | OUTPATIENT
Start: 2019-06-12 | End: 2019-06-12 | Stop reason: HOSPADM

## 2019-06-12 RX ORDER — ALBUTEROL SULFATE 0.83 MG/ML
2.5 SOLUTION RESPIRATORY (INHALATION)
Status: DISCONTINUED | OUTPATIENT
Start: 2019-06-12 | End: 2019-06-14 | Stop reason: HOSPADM

## 2019-06-12 RX ORDER — SODIUM CHLORIDE, SODIUM LACTATE, POTASSIUM CHLORIDE, CALCIUM CHLORIDE 600; 310; 30; 20 MG/100ML; MG/100ML; MG/100ML; MG/100ML
125 INJECTION, SOLUTION INTRAVENOUS CONTINUOUS
Status: DISCONTINUED | OUTPATIENT
Start: 2019-06-12 | End: 2019-06-14 | Stop reason: HOSPADM

## 2019-06-12 RX ORDER — HYDROMORPHONE HYDROCHLORIDE 1 MG/ML
0.2 INJECTION, SOLUTION INTRAMUSCULAR; INTRAVENOUS; SUBCUTANEOUS
Status: DISCONTINUED | OUTPATIENT
Start: 2019-06-12 | End: 2019-06-12 | Stop reason: HOSPADM

## 2019-06-12 RX ORDER — PROPOFOL 10 MG/ML
INJECTION, EMULSION INTRAVENOUS AS NEEDED
Status: DISCONTINUED | OUTPATIENT
Start: 2019-06-12 | End: 2019-06-12 | Stop reason: HOSPADM

## 2019-06-12 RX ORDER — ALBUTEROL SULFATE 90 UG/1
2 AEROSOL, METERED RESPIRATORY (INHALATION)
Status: DISCONTINUED | OUTPATIENT
Start: 2019-06-12 | End: 2019-06-12 | Stop reason: CLARIF

## 2019-06-12 RX ORDER — GABAPENTIN 100 MG/1
200 CAPSULE ORAL 2 TIMES DAILY
Status: DISCONTINUED | OUTPATIENT
Start: 2019-06-12 | End: 2019-06-14 | Stop reason: HOSPADM

## 2019-06-12 RX ORDER — NALOXONE HYDROCHLORIDE 0.4 MG/ML
0.4 INJECTION, SOLUTION INTRAMUSCULAR; INTRAVENOUS; SUBCUTANEOUS AS NEEDED
Status: DISCONTINUED | OUTPATIENT
Start: 2019-06-12 | End: 2019-06-14 | Stop reason: HOSPADM

## 2019-06-12 RX ORDER — SODIUM CHLORIDE, SODIUM LACTATE, POTASSIUM CHLORIDE, CALCIUM CHLORIDE 600; 310; 30; 20 MG/100ML; MG/100ML; MG/100ML; MG/100ML
INJECTION, SOLUTION INTRAVENOUS
Status: DISCONTINUED | OUTPATIENT
Start: 2019-06-12 | End: 2019-06-12 | Stop reason: HOSPADM

## 2019-06-12 RX ORDER — ACETAMINOPHEN 500 MG
1000 TABLET ORAL EVERY 6 HOURS
Status: DISCONTINUED | OUTPATIENT
Start: 2019-06-12 | End: 2019-06-14 | Stop reason: HOSPADM

## 2019-06-12 RX ORDER — MIDAZOLAM HYDROCHLORIDE 1 MG/ML
INJECTION, SOLUTION INTRAMUSCULAR; INTRAVENOUS AS NEEDED
Status: DISCONTINUED | OUTPATIENT
Start: 2019-06-12 | End: 2019-06-12 | Stop reason: HOSPADM

## 2019-06-12 RX ORDER — DEXMEDETOMIDINE HYDROCHLORIDE 4 UG/ML
INJECTION, SOLUTION INTRAVENOUS AS NEEDED
Status: DISCONTINUED | OUTPATIENT
Start: 2019-06-12 | End: 2019-06-12 | Stop reason: HOSPADM

## 2019-06-12 RX ORDER — ARFORMOTEROL TARTRATE 15 UG/2ML
15 SOLUTION RESPIRATORY (INHALATION)
Status: DISCONTINUED | OUTPATIENT
Start: 2019-06-12 | End: 2019-06-14 | Stop reason: HOSPADM

## 2019-06-12 RX ORDER — HYDROCODONE BITARTRATE AND ACETAMINOPHEN 5; 325 MG/1; MG/1
1 TABLET ORAL AS NEEDED
Status: DISCONTINUED | OUTPATIENT
Start: 2019-06-12 | End: 2019-06-12 | Stop reason: HOSPADM

## 2019-06-12 RX ORDER — ROCURONIUM BROMIDE 10 MG/ML
INJECTION, SOLUTION INTRAVENOUS AS NEEDED
Status: DISCONTINUED | OUTPATIENT
Start: 2019-06-12 | End: 2019-06-12 | Stop reason: HOSPADM

## 2019-06-12 RX ORDER — LORAZEPAM 2 MG/ML
1 INJECTION INTRAMUSCULAR
Status: DISCONTINUED | OUTPATIENT
Start: 2019-06-12 | End: 2019-06-14 | Stop reason: HOSPADM

## 2019-06-12 RX ORDER — FENTANYL CITRATE 50 UG/ML
INJECTION, SOLUTION INTRAMUSCULAR; INTRAVENOUS AS NEEDED
Status: DISCONTINUED | OUTPATIENT
Start: 2019-06-12 | End: 2019-06-12 | Stop reason: HOSPADM

## 2019-06-12 RX ORDER — SCOLOPAMINE TRANSDERMAL SYSTEM 1 MG/1
1 PATCH, EXTENDED RELEASE TRANSDERMAL ONCE
Status: DISCONTINUED | OUTPATIENT
Start: 2019-06-12 | End: 2019-06-14 | Stop reason: HOSPADM

## 2019-06-12 RX ORDER — HYDROMORPHONE HYDROCHLORIDE 1 MG/ML
1 INJECTION, SOLUTION INTRAMUSCULAR; INTRAVENOUS; SUBCUTANEOUS
Status: DISCONTINUED | OUTPATIENT
Start: 2019-06-12 | End: 2019-06-14 | Stop reason: HOSPADM

## 2019-06-12 RX ORDER — LIDOCAINE HYDROCHLORIDE ANHYDROUS AND DEXTROSE MONOHYDRATE .8; 5 G/100ML; G/100ML
1 INJECTION, SOLUTION INTRAVENOUS CONTINUOUS
Status: ACTIVE | OUTPATIENT
Start: 2019-06-12 | End: 2019-06-13

## 2019-06-12 RX ORDER — SODIUM CHLORIDE 0.9 % (FLUSH) 0.9 %
5-40 SYRINGE (ML) INJECTION AS NEEDED
Status: DISCONTINUED | OUTPATIENT
Start: 2019-06-12 | End: 2019-06-12 | Stop reason: HOSPADM

## 2019-06-12 RX ORDER — MORPHINE SULFATE 10 MG/ML
2 INJECTION, SOLUTION INTRAMUSCULAR; INTRAVENOUS
Status: DISCONTINUED | OUTPATIENT
Start: 2019-06-12 | End: 2019-06-12 | Stop reason: HOSPADM

## 2019-06-12 RX ORDER — LIDOCAINE HYDROCHLORIDE 20 MG/ML
INJECTION, SOLUTION EPIDURAL; INFILTRATION; INTRACAUDAL; PERINEURAL AS NEEDED
Status: DISCONTINUED | OUTPATIENT
Start: 2019-06-12 | End: 2019-06-12 | Stop reason: HOSPADM

## 2019-06-12 RX ORDER — LIDOCAINE HYDROCHLORIDE 10 MG/ML
0.1 INJECTION, SOLUTION EPIDURAL; INFILTRATION; INTRACAUDAL; PERINEURAL AS NEEDED
Status: DISCONTINUED | OUTPATIENT
Start: 2019-06-12 | End: 2019-06-12 | Stop reason: HOSPADM

## 2019-06-12 RX ORDER — DIPHENHYDRAMINE HYDROCHLORIDE 50 MG/ML
12.5 INJECTION, SOLUTION INTRAMUSCULAR; INTRAVENOUS AS NEEDED
Status: DISCONTINUED | OUTPATIENT
Start: 2019-06-12 | End: 2019-06-12 | Stop reason: HOSPADM

## 2019-06-12 RX ORDER — ACETAMINOPHEN 325 MG/1
650 TABLET ORAL ONCE
Status: DISCONTINUED | OUTPATIENT
Start: 2019-06-12 | End: 2019-06-12 | Stop reason: HOSPADM

## 2019-06-12 RX ORDER — GABAPENTIN 250 MG/5ML
600 SOLUTION ORAL ONCE
Status: COMPLETED | OUTPATIENT
Start: 2019-06-12 | End: 2019-06-12

## 2019-06-12 RX ADMIN — ROCURONIUM BROMIDE 10 MG: 10 INJECTION, SOLUTION INTRAVENOUS at 11:39

## 2019-06-12 RX ADMIN — ROCURONIUM BROMIDE 10 MG: 10 INJECTION, SOLUTION INTRAVENOUS at 09:55

## 2019-06-12 RX ADMIN — ONDANSETRON 4 MG: 2 INJECTION INTRAMUSCULAR; INTRAVENOUS at 19:12

## 2019-06-12 RX ADMIN — GABAPENTIN 600 MG: 250 SOLUTION ORAL at 08:02

## 2019-06-12 RX ADMIN — HYOSCYAMINE SULFATE 0.12 MG: 0.12 TABLET ORAL at 12:43

## 2019-06-12 RX ADMIN — CEFOTETAN DISODIUM 2 G: 2 INJECTION, POWDER, FOR SOLUTION INTRAMUSCULAR; INTRAVENOUS at 21:52

## 2019-06-12 RX ADMIN — BUDESONIDE 500 MCG: 0.5 INHALANT RESPIRATORY (INHALATION) at 19:52

## 2019-06-12 RX ADMIN — SODIUM CHLORIDE, SODIUM LACTATE, POTASSIUM CHLORIDE, AND CALCIUM CHLORIDE 125 ML/HR: 600; 310; 30; 20 INJECTION, SOLUTION INTRAVENOUS at 21:52

## 2019-06-12 RX ADMIN — ONDANSETRON 4 MG: 2 INJECTION INTRAMUSCULAR; INTRAVENOUS at 10:10

## 2019-06-12 RX ADMIN — LIDOCAINE HYDROCHLORIDE 100 MG: 20 INJECTION, SOLUTION EPIDURAL; INFILTRATION; INTRACAUDAL; PERINEURAL at 09:55

## 2019-06-12 RX ADMIN — ENOXAPARIN SODIUM 40 MG: 40 INJECTION SUBCUTANEOUS at 09:31

## 2019-06-12 RX ADMIN — DEXMEDETOMIDINE HYDROCHLORIDE 16 MCG: 4 INJECTION, SOLUTION INTRAVENOUS at 12:06

## 2019-06-12 RX ADMIN — Medication 10 ML: at 16:06

## 2019-06-12 RX ADMIN — FENTANYL CITRATE 100 MCG: 50 INJECTION, SOLUTION INTRAMUSCULAR; INTRAVENOUS at 09:55

## 2019-06-12 RX ADMIN — HYDROMORPHONE HYDROCHLORIDE 0.2 MG: 1 INJECTION, SOLUTION INTRAMUSCULAR; INTRAVENOUS; SUBCUTANEOUS at 13:40

## 2019-06-12 RX ADMIN — PROPOFOL 100 MG: 10 INJECTION, EMULSION INTRAVENOUS at 11:18

## 2019-06-12 RX ADMIN — ROCURONIUM BROMIDE 5 MG: 10 INJECTION, SOLUTION INTRAVENOUS at 11:18

## 2019-06-12 RX ADMIN — KETAMINE HYDROCHLORIDE 50 MG: 10 INJECTION, SOLUTION INTRAMUSCULAR; INTRAVENOUS at 10:05

## 2019-06-12 RX ADMIN — DEXMEDETOMIDINE HYDROCHLORIDE 12 MCG: 4 INJECTION, SOLUTION INTRAVENOUS at 12:00

## 2019-06-12 RX ADMIN — HYOSCYAMINE SULFATE 0.12 MG: 0.12 TABLET ORAL at 22:34

## 2019-06-12 RX ADMIN — HYDROMORPHONE HYDROCHLORIDE 0.2 MG: 1 INJECTION, SOLUTION INTRAMUSCULAR; INTRAVENOUS; SUBCUTANEOUS at 12:45

## 2019-06-12 RX ADMIN — LORAZEPAM 1 MG: 2 INJECTION, SOLUTION INTRAMUSCULAR; INTRAVENOUS at 12:43

## 2019-06-12 RX ADMIN — HYDROMORPHONE HYDROCHLORIDE 1 MG: 1 INJECTION, SOLUTION INTRAMUSCULAR; INTRAVENOUS; SUBCUTANEOUS at 22:31

## 2019-06-12 RX ADMIN — SUCCINYLCHOLINE CHLORIDE 200 MG: 20 INJECTION INTRAMUSCULAR; INTRAVENOUS at 09:55

## 2019-06-12 RX ADMIN — MAGNESIUM SULFATE HEPTAHYDRATE 2 G: 40 INJECTION, SOLUTION INTRAVENOUS at 10:05

## 2019-06-12 RX ADMIN — LIDOCAINE HYDROCHLORIDE ANHYDROUS AND DEXTROSE MONOHYDRATE 2 MG/KG/HR: .8; 5 INJECTION, SOLUTION INTRAVENOUS at 10:05

## 2019-06-12 RX ADMIN — ONDANSETRON 4 MG: 2 INJECTION INTRAMUSCULAR; INTRAVENOUS at 16:05

## 2019-06-12 RX ADMIN — SODIUM CHLORIDE, SODIUM LACTATE, POTASSIUM CHLORIDE, AND CALCIUM CHLORIDE 125 ML/HR: 600; 310; 30; 20 INJECTION, SOLUTION INTRAVENOUS at 14:48

## 2019-06-12 RX ADMIN — DEXMEDETOMIDINE HYDROCHLORIDE 12 MCG: 4 INJECTION, SOLUTION INTRAVENOUS at 10:41

## 2019-06-12 RX ADMIN — DEXAMETHASONE SODIUM PHOSPHATE 8 MG: 4 INJECTION, SOLUTION INTRA-ARTICULAR; INTRALESIONAL; INTRAMUSCULAR; INTRAVENOUS; SOFT TISSUE at 10:10

## 2019-06-12 RX ADMIN — HYDROMORPHONE HYDROCHLORIDE 1 MG: 1 INJECTION, SOLUTION INTRAMUSCULAR; INTRAVENOUS; SUBCUTANEOUS at 19:13

## 2019-06-12 RX ADMIN — ACETAMINOPHEN 1000 MG: 650 SOLUTION ORAL at 08:03

## 2019-06-12 RX ADMIN — HYDROMORPHONE HYDROCHLORIDE 0.2 MG: 1 INJECTION, SOLUTION INTRAMUSCULAR; INTRAVENOUS; SUBCUTANEOUS at 14:18

## 2019-06-12 RX ADMIN — GABAPENTIN 200 MG: 100 CAPSULE ORAL at 19:13

## 2019-06-12 RX ADMIN — SODIUM CHLORIDE, SODIUM LACTATE, POTASSIUM CHLORIDE, CALCIUM CHLORIDE: 600; 310; 30; 20 INJECTION, SOLUTION INTRAVENOUS at 09:55

## 2019-06-12 RX ADMIN — SODIUM CHLORIDE, SODIUM LACTATE, POTASSIUM CHLORIDE, AND CALCIUM CHLORIDE 1000 ML: 600; 310; 30; 20 INJECTION, SOLUTION INTRAVENOUS at 08:11

## 2019-06-12 RX ADMIN — ACETAMINOPHEN 1000 MG: 500 TABLET ORAL at 19:13

## 2019-06-12 RX ADMIN — HYDROMORPHONE HYDROCHLORIDE 0.2 MG: 1 INJECTION, SOLUTION INTRAMUSCULAR; INTRAVENOUS; SUBCUTANEOUS at 13:00

## 2019-06-12 RX ADMIN — HYDROMORPHONE HYDROCHLORIDE 1 MG: 1 INJECTION, SOLUTION INTRAMUSCULAR; INTRAVENOUS; SUBCUTANEOUS at 16:05

## 2019-06-12 RX ADMIN — ACETAMINOPHEN 1000 MG: 500 TABLET ORAL at 13:43

## 2019-06-12 RX ADMIN — HYDROMORPHONE HYDROCHLORIDE 0.2 MG: 1 INJECTION, SOLUTION INTRAMUSCULAR; INTRAVENOUS; SUBCUTANEOUS at 15:08

## 2019-06-12 RX ADMIN — CEFOTETAN DISODIUM 2 G: 2 INJECTION, POWDER, FOR SOLUTION INTRAMUSCULAR; INTRAVENOUS at 10:05

## 2019-06-12 RX ADMIN — PROPOFOL 300 MG: 10 INJECTION, EMULSION INTRAVENOUS at 09:55

## 2019-06-12 RX ADMIN — ARFORMOTEROL TARTRATE 15 MCG: 15 SOLUTION RESPIRATORY (INHALATION) at 19:53

## 2019-06-12 RX ADMIN — MIDAZOLAM HYDROCHLORIDE 2 MG: 1 INJECTION, SOLUTION INTRAMUSCULAR; INTRAVENOUS at 09:52

## 2019-06-12 NOTE — BRIEF OP NOTE
BRIEF OPERATIVE NOTE    Date of Procedure: 6/12/2019   Preoperative Diagnosis: MORBID OBESITY  Postoperative Diagnosis: MORBID OBESITY    Procedure(s):  LAPAROSCOPIC GASTRIC BYPASS WITH ENDOSCOPY WITH ERAS PROTOCOL  ESOPHAGOGASTRODUODENOSCOPY (EGD)  Surgeon(s) and Role:     Rene Cox MD - Primary         Surgical Assistant: Bart Carlos    Surgical Staff:  Circ-1: Dasha Velez  Physician Assistant: GARRETT Webb  Scrub Tech-1: Sara Seymour  Scrub RN-Relief: Marilou Malloy RN  Float Staff: Dena Hashimoto, RN  Event Time In Time Out   Incision Start 1032    Incision Close       Anesthesia: General   Estimated Blood Loss: 60 cc  Specimens:   ID Type Source Tests Collected by Time Destination   1 : PORTION OF SMALL BOWEL AND ANASTOMOSIS RINGS Fresh Intestine  Celina Brewer MD 6/12/2019 1057 Pathology      Findings: 30 cc gastric pouch; 100 cm AC/AG Soila; no air leak or hemorrhage on endoscopy   Complications: none  Implants:   Implant Name Type Inv.  Item Serial No.  Lot No. LRB No. Used Action   GORE SEAMGUARD   NA  [id number] N/A 2 Implanted   GORE SEAMGUARD   NA  [id number]/A 4 Implanted

## 2019-06-12 NOTE — ANESTHESIA POSTPROCEDURE EVALUATION
Post-Anesthesia Evaluation and Assessment    Patient: Owen Mccann MRN: [id number]  SSN: [id number]    YOB: 1980  Age: 34 y.o. Sex: female      I have evaluated the patient and they are stable and ready for discharge from the PACU. Cardiovascular Function/Vital Signs  Visit Vitals  /55   Pulse 79   Temp 36.6 °C (97.8 °F)   Resp 20   Ht 5' 8\" (1.727 m)   Wt (!) 178 kg (392 lb 6.7 oz)   SpO2 99%   BMI 59.67 kg/m²       Patient is status post General anesthesia for Procedure(s):  LAPAROSCOPIC GASTRIC BYPASS WITH ENDOSCOPY WITH ERAS PROTOCOL  ESOPHAGOGASTRODUODENOSCOPY (EGD). Nausea/Vomiting: None    Postoperative hydration reviewed and adequate. Pain:  Pain Scale 1: Numeric (0 - 10) (06/12/19 1509)  Pain Intensity 1: 8 (06/12/19 1509)   Managed    Neurological Status:   Neuro (WDL): Exceptions to WDL (06/12/19 1232)  Neuro  Neurologic State: Agitated; Anesthetized;Drowsy; Eyes open spontaneously (06/12/19 1232)   At baseline    Mental Status, Level of Consciousness: Alert and  oriented to person, place, and time    Pulmonary Status:   O2 Device: Nasal cannula (06/12/19 1236)   Adequate oxygenation and airway patent    Complications related to anesthesia: None    Post-anesthesia assessment completed. No concerns    Signed By: Cony Salazar MD     June 12, 2019              Procedure(s):  LAPAROSCOPIC GASTRIC BYPASS WITH ENDOSCOPY WITH ERAS PROTOCOL  ESOPHAGOGASTRODUODENOSCOPY (EGD). general    <BSHSIANPOST>    Vitals Value Taken Time   /55 6/12/2019  3:15 PM   Temp 36.6 °C (97.8 °F) 6/12/2019  2:50 PM   Pulse 79 6/12/2019  3:21 PM   Resp 19 6/12/2019  3:21 PM   SpO2 100 % 6/12/2019  3:21 PM   Vitals shown include unvalidated device data.

## 2019-06-12 NOTE — ANESTHESIA PREPROCEDURE EVALUATION
Relevant Problems   No relevant active problems       Anesthetic History   No history of anesthetic complications            Review of Systems / Medical History  Patient summary reviewed, nursing notes reviewed and pertinent labs reviewed    Pulmonary            Asthma : well controlled       Neuro/Psych   Within defined limits           Cardiovascular                  Exercise tolerance: >4 METS     GI/Hepatic/Renal     GERD: well controlled           Endo/Other      Hypothyroidism: well controlled  Morbid obesity and arthritis     Other Findings              Physical Exam    Airway  Mallampati: II  TM Distance: > 6 cm  Neck ROM: normal range of motion   Mouth opening: Normal     Cardiovascular  Regular rate and rhythm,  S1 and S2 normal,  no murmur, click, rub, or gallop             Dental  No notable dental hx       Pulmonary  Breath sounds clear to auscultation               Abdominal  GI exam deferred       Other Findings            Anesthetic Plan    ASA: 3  Anesthesia type: general          Induction: Intravenous  Anesthetic plan and risks discussed with: Patient

## 2019-06-12 NOTE — ROUTINE PROCESS
Patient: Matt Serna MRN: [id number]  SSN: [id number] YOB: 1980  Age: 34 y.o. Sex: female Patient is status post Procedure(s): LAPAROSCOPIC GASTRIC BYPASS WITH ENDOSCOPY WITH ERAS PROTOCOL 
ESOPHAGOGASTRODUODENOSCOPY (EGD). Surgeon(s) and Role: Alan Yao MD - Primary Local/Dose/Irrigation:  0,5% marcaine with epi 30 ml Peripheral IV 06/12/19 Left Wrist (Active) Site Assessment Clean, dry, & intact 6/12/2019  8:33 AM  
Phlebitis Assessment 0 6/12/2019  8:33 AM  
Infiltration Assessment 0 6/12/2019  8:33 AM  
Dressing Status Clean, dry, & intact 6/12/2019  8:33 AM  
Dressing Type Transparent 6/12/2019  8:33 AM  
Hub Color/Line Status Pink; Infusing 6/12/2019  8:33 AM  
   
Peripheral IV 06/12/19 Left Hand (Active) Les-Wright Drain 06/12/19 Left Abdomen (Active) Dressing/Packing:  Wound Abdomen 5 sites and a drain site -Dressing Type: Topical skin adhesive/glue (06/12/19 1215) Splint/Cast:  ] Other:  None

## 2019-06-12 NOTE — H&P
HISTORY & PHYSICAL     The patient is a 34 y.o. White female with morbid obesity; no success with medical management.            Patient Active Problem List     Diagnosis Date Noted    Obesity, morbid ([address] 05/15/2018    Normal labor 2015    Condyloma acuminata of vulva during pregnancy 2015    Papanicolaou smear of cervix with atypical squamous cells of undetermined significance (ASC-US) 2015    Cervical high risk human papillomavirus (HPV) DNA test positive 2015    Rubella non-immune status, antepartum 2015    Obesity in pregnancy with antepartum complication     Supervision of other high-risk pregnancy(V23.89) 2015           Past Medical History:   Diagnosis Date    Adverse effect of anesthesia       WOKE UP DURING ENDOSCOPY WITH KETAMINE AND PATIENT REPORTS FEELING LIKE SHE WAS HALLUCINATING - BUT DID OK WITH PROPOFOL IN PAST WITH NO ISSUES PER PATIENT    Arthritis       BILATERAL KNEES- OA    Asthma      Chronic pain       KNEES    GERD (gastroesophageal reflux disease)      Hiatal hernia      Hypothyroid      Morbid obesity ([address]      PUD (peptic ulcer disease)       PRE ULCER -    Second hand smoke exposure              Past Surgical History:   Procedure Laterality Date    HX  SECTION       HX ENDOSCOPY        HX GI   2016     ENDOSCOPY      Social History            Tobacco Use    Smoking status: Former Smoker       Packs/day: 1.00       Years: 15.00       Pack years: 15.00       Last attempt to quit: 2014       Years since quittin.5    Smokeless tobacco: Never Used   Substance Use Topics    Alcohol use:  Yes       Comment: OCCASIONALLY            Family History   Problem Relation Age of Onset   [facility] Arthritis-rheumatoid Mother      COPD Mother      Diabetes Father      Stroke Father      Hypertension Father      Cancer Maternal Uncle      Diabetes Paternal Grandmother      No Known Problems Brother      Breast Cancer Neg Hx      Ovarian Cancer Neg Hx      Uterine Cancer Neg Hx      Anesth Problems Neg Hx                Prior to Admission medications    Medication Sig Start Date End Date Taking? Authorizing Provider   levothyroxine (SYNTHROID) 175 mcg tablet Take 175 mcg by mouth Daily (before breakfast).     Yes Provider, Historical   fluticasone furoate-vilanterol (BREO ELLIPTA) 100-25 mcg/dose inhaler Take 1 Puff by inhalation daily. Indications: AT 3PM     Yes Provider, Historical   albuterol (PROVENTIL VENTOLIN) 2.5 mg /3 mL (0.083 %) nebulizer solution 3 mL by Nebulization route every four (4) hours as needed for Wheezing. 3/9/19   Yes Jennifer Benson NP   doxycycline (VIBRA-TABS) 100 mg tablet Take 100 mg by mouth as needed (ACNE).     Yes Provider, Historical   HYDROcodone-acetaminophen (NORCO)  mg tablet Take 1 Tab by mouth every six (6) hours as needed for Pain.     Yes Provider, Historical   acetaminophen (TYLENOL EXTRA STRENGTH) 500 mg tablet Take 1,500 mg by mouth every six (6) hours as needed for Pain.     Yes Other, MD Masha   etonogestrel (NEXPLANON) 68 mg impl by SubDERmal route. BIRTH CONTROL - 5/23/18 INSERTION DATE     Yes Other, MD Masha   famotidine (PEPCID) 20 mg tablet Take 20 mg by mouth daily.     Yes Other, MD Masha   PROAIR HFA 90 mcg/actuation inhaler Take 2 Puffs by inhalation every four (4) hours as needed for Wheezing or Shortness of Breath. Every 4 to 6 hours as needed 3/4/16   Yes Provider, Historical   ondansetron (ZOFRAN ODT) 4 mg disintegrating tablet Take 1 Tab by mouth every eight (8) hours as needed for Nausea. 2/26/19     Mara PALAFOX NP   ergocalciferol (ERGOCALCIFEROL) 50,000 unit capsule Take 1 Cap by mouth every seven (7) days. 2/26/19     Mine Bolden, NP   enoxaparin (LOVENOX) 40 mg/0.4 mL 0.4 mL by SubCUTAneous route daily.  3/28/19     Mine Bolden NP           Allergies   Allergen Reactions    Percocet [Oxycodone-Acetaminophen] Nausea and Vomiting       Review of Systems   Constitutional: Negative for chills, fever and malaise/fatigue. HENT: Negative for congestion, ear pain, hearing loss, sinus pain, sore throat and tinnitus. Eyes: Negative for blurred vision, double vision, pain, discharge and redness. Wears glasses   Respiratory: Positive for cough, shortness of breath and wheezing. Hx of asthma, former smoker   Cardiovascular: Negative for chest pain, palpitations and leg swelling. Gastrointestinal: Negative for abdominal pain, constipation, diarrhea, heartburn, nausea and vomiting. Genitourinary: Negative for dysuria and urgency. Musculoskeletal: Positive for joint pain (knee pain). Negative for back pain and neck pain. Skin: Negative for itching and rash. Neurological: Negative for dizziness, seizures and headaches. Psychiatric/Behavioral: Negative for depression.       Physical Exam   Visit Vitals  /73 (BP 1 Location: Right arm, BP Patient Position: At rest)   Pulse 79   Temp 98.6 °F (37 °C)   Resp 18   Ht 5' 8\" (1.727 m)   Wt (!) 392 lb 6.7 oz (178 kg)   LMP 05/21/2019 (Approximate)   SpO2 100%   BMI 59.67 kg/m²       Constitutional: She is oriented to person, place, and time. She appears well-developed and well-nourished. Cardiovascular: Normal rate and regular rhythm. Exam reveals no gallop and no friction rub. No murmur heard. Pulmonary/Chest: Effort normal and breath sounds normal.   Abdominal: Soft. Bowel sounds are normal. She exhibits no distension. There is no tenderness. No masses or hernias noted. Neurological: She is alert and oriented to person, place, and time. Skin: Skin is warm and dry. Psychiatric: She has a normal mood and affect.         ASSESSMENT and PLAN     Morbid obesity with body mass index of 59.7; Co-morbids listed below; no success with medical management.          ICD-10-CM ICD-9-CM     1. Morbid obesity with BMI of 50.0-59.9, adult ([facility]) E66.01 278.01       Z68.43 V85.43     2.  BMI 50. 0-59.9, adult [facility] Z68.43 V85.43     3. Gastroesophageal reflux disease without esophagitis K21.9 530.81     4. PUD (peptic ulcer disease) K27.9 533.90     5. Hypothyroidism, unspecified type E03.9 244. 9     6. Vitamin D deficiency E55.9 268. 9          Plan laparoscopic gastric bypass with endoscopy. Technical aspects of procedure reviewed along with risks (to include but not limited to bleeding, infection, leak, VTE, open procedure, GERD/ulcer/stricture/dysphagia, poor weight loss/weight regain); also discussed alternatives, activity restrictions, post-op diet. She understands and desires to proceed.  All questions answered.

## 2019-06-12 NOTE — PERIOP NOTES
TRANSFER - OUT REPORT:    Verbal report given to [facility]    (name) on Scout Paige  being transferred to  for routine post - op       Report consisted of patients Situation, Background, Assessment and   Recommendations(SBAR). Time Pre op antibiotic given:1005  Anesthesia Stop time: 5099  Bowden Present on Transfer to floor:no  Order for Bowden on Chart:no  Discharge Prescriptions with Chart:no    Information from the following report(s) SBAR, OR Summary, Intake/Output and MAR was reviewed with the receiving nurse. Opportunity for questions and clarification was provided. Is the patient on 02? YES       L/Min 1       Other     Is the patient on a monitor? NO    Is the nurse transporting with the patient? NO    Surgical Waiting Area notified of patient's transfer from PACU? YES      The following personal items collected during your admission accompanied patient upon transfer:   Dental Appliance: Dental Appliances: None  Vision: Visual Aid: Glasses(sent to pacu)  Hearing Aid:    Jewelry: Jewelry: None  Clothing: Clothing: Other (comment)(1 bag of clothes returned)  Other Valuables:  Other Valuables: Eyeglasses(placed in clothes bag)  Valuables sent to safe:

## 2019-06-13 LAB
ANION GAP SERPL CALC-SCNC: 6 MMOL/L (ref 5–15)
BASOPHILS # BLD: 0 K/UL (ref 0–0.1)
BASOPHILS NFR BLD: 0 % (ref 0–1)
BUN SERPL-MCNC: 12 MG/DL (ref 6–20)
BUN/CREAT SERPL: 12 (ref 12–20)
CALCIUM SERPL-MCNC: 8.6 MG/DL (ref 8.5–10.1)
CHLORIDE SERPL-SCNC: 106 MMOL/L (ref 97–108)
CO2 SERPL-SCNC: 25 MMOL/L (ref 21–32)
CREAT SERPL-MCNC: 1.03 MG/DL (ref 0.55–1.02)
DIFFERENTIAL METHOD BLD: ABNORMAL
EOSINOPHIL # BLD: 0 K/UL (ref 0–0.4)
EOSINOPHIL NFR BLD: 0 % (ref 0–7)
ERYTHROCYTE [DISTWIDTH] IN BLOOD BY AUTOMATED COUNT: 14.5 % (ref 11.5–14.5)
GLUCOSE SERPL-MCNC: 109 MG/DL (ref 65–100)
HCT VFR BLD AUTO: 40.3 % (ref 35–47)
HGB BLD-MCNC: 12.6 G/DL (ref 11.5–16)
IMM GRANULOCYTES # BLD AUTO: 0.1 K/UL (ref 0–0.04)
IMM GRANULOCYTES NFR BLD AUTO: 1 % (ref 0–0.5)
LYMPHOCYTES # BLD: 1.4 K/UL (ref 0.8–3.5)
LYMPHOCYTES NFR BLD: 7 % (ref 12–49)
MCH RBC QN AUTO: 28.3 PG (ref 26–34)
MCHC RBC AUTO-ENTMCNC: 31.3 G/DL (ref 30–36.5)
MCV RBC AUTO: 90.6 FL (ref 80–99)
MONOCYTES # BLD: 1.5 K/UL (ref 0–1)
MONOCYTES NFR BLD: 7 % (ref 5–13)
NEUTS SEG # BLD: 17.4 K/UL (ref 1.8–8)
NEUTS SEG NFR BLD: 85 % (ref 32–75)
NRBC # BLD: 0 K/UL (ref 0–0.01)
NRBC BLD-RTO: 0 PER 100 WBC
PLATELET # BLD AUTO: 358 K/UL (ref 150–400)
PMV BLD AUTO: 8.8 FL (ref 8.9–12.9)
POTASSIUM SERPL-SCNC: 4 MMOL/L (ref 3.5–5.1)
RBC # BLD AUTO: 4.45 M/UL (ref 3.8–5.2)
SODIUM SERPL-SCNC: 137 MMOL/L (ref 136–145)
WBC # BLD AUTO: 20.4 K/UL (ref 3.6–11)

## 2019-06-13 PROCEDURE — 65660000000 HC RM CCU STEPDOWN

## 2019-06-13 PROCEDURE — 74011000250 HC RX REV CODE- 250: Performed by: SURGERY

## 2019-06-13 PROCEDURE — 74011250636 HC RX REV CODE- 250/636: Performed by: SURGERY

## 2019-06-13 PROCEDURE — 94664 DEMO&/EVAL PT USE INHALER: CPT

## 2019-06-13 PROCEDURE — 74011250637 HC RX REV CODE- 250/637: Performed by: SURGERY

## 2019-06-13 PROCEDURE — 94640 AIRWAY INHALATION TREATMENT: CPT

## 2019-06-13 PROCEDURE — 80048 BASIC METABOLIC PNL TOTAL CA: CPT

## 2019-06-13 PROCEDURE — 77010033678 HC OXYGEN DAILY

## 2019-06-13 PROCEDURE — 85025 COMPLETE CBC W/AUTO DIFF WBC: CPT

## 2019-06-13 PROCEDURE — 36415 COLL VENOUS BLD VENIPUNCTURE: CPT

## 2019-06-13 RX ORDER — HYDROMORPHONE HYDROCHLORIDE 2 MG/1
2-4 TABLET ORAL
Qty: 30 TAB | Refills: 0 | Status: SHIPPED | OUTPATIENT
Start: 2019-06-13 | End: 2019-06-14

## 2019-06-13 RX ADMIN — ONDANSETRON 4 MG: 2 INJECTION INTRAMUSCULAR; INTRAVENOUS at 02:15

## 2019-06-13 RX ADMIN — SODIUM CHLORIDE, SODIUM LACTATE, POTASSIUM CHLORIDE, AND CALCIUM CHLORIDE 125 ML/HR: 600; 310; 30; 20 INJECTION, SOLUTION INTRAVENOUS at 06:13

## 2019-06-13 RX ADMIN — GABAPENTIN 200 MG: 100 CAPSULE ORAL at 10:00

## 2019-06-13 RX ADMIN — ONDANSETRON 4 MG: 2 INJECTION INTRAMUSCULAR; INTRAVENOUS at 19:08

## 2019-06-13 RX ADMIN — ONDANSETRON 4 MG: 2 INJECTION INTRAMUSCULAR; INTRAVENOUS at 10:15

## 2019-06-13 RX ADMIN — HYOSCYAMINE SULFATE 0.12 MG: 0.12 TABLET ORAL at 19:09

## 2019-06-13 RX ADMIN — ONDANSETRON 4 MG: 2 INJECTION INTRAMUSCULAR; INTRAVENOUS at 06:04

## 2019-06-13 RX ADMIN — ENOXAPARIN SODIUM 40 MG: 40 INJECTION SUBCUTANEOUS at 10:01

## 2019-06-13 RX ADMIN — HYDROMORPHONE HYDROCHLORIDE 4 MG: 2 TABLET ORAL at 14:17

## 2019-06-13 RX ADMIN — Medication 10 ML: at 14:18

## 2019-06-13 RX ADMIN — ACETAMINOPHEN 1000 MG: 500 TABLET ORAL at 20:08

## 2019-06-13 RX ADMIN — SODIUM CHLORIDE, SODIUM LACTATE, POTASSIUM CHLORIDE, AND CALCIUM CHLORIDE 125 ML/HR: 600; 310; 30; 20 INJECTION, SOLUTION INTRAVENOUS at 14:15

## 2019-06-13 RX ADMIN — ACETAMINOPHEN 1000 MG: 500 TABLET ORAL at 10:00

## 2019-06-13 RX ADMIN — BUDESONIDE 500 MCG: 0.5 INHALANT RESPIRATORY (INHALATION) at 22:52

## 2019-06-13 RX ADMIN — LEVOTHYROXINE SODIUM 175 MCG: 150 TABLET ORAL at 06:46

## 2019-06-13 RX ADMIN — HYDROMORPHONE HYDROCHLORIDE 4 MG: 2 TABLET ORAL at 02:09

## 2019-06-13 RX ADMIN — BUDESONIDE 500 MCG: 0.5 INHALANT RESPIRATORY (INHALATION) at 07:43

## 2019-06-13 RX ADMIN — GABAPENTIN 200 MG: 100 CAPSULE ORAL at 19:09

## 2019-06-13 RX ADMIN — ACETAMINOPHEN 1000 MG: 500 TABLET ORAL at 02:09

## 2019-06-13 RX ADMIN — HYOSCYAMINE SULFATE 0.12 MG: 0.12 TABLET ORAL at 04:20

## 2019-06-13 RX ADMIN — ACETAMINOPHEN 1000 MG: 500 TABLET ORAL at 14:17

## 2019-06-13 RX ADMIN — ARFORMOTEROL TARTRATE 15 MCG: 15 SOLUTION RESPIRATORY (INHALATION) at 22:52

## 2019-06-13 RX ADMIN — HYDROMORPHONE HYDROCHLORIDE 1 MG: 1 INJECTION, SOLUTION INTRAMUSCULAR; INTRAVENOUS; SUBCUTANEOUS at 20:09

## 2019-06-13 RX ADMIN — HYDROMORPHONE HYDROCHLORIDE 1 MG: 1 INJECTION, SOLUTION INTRAMUSCULAR; INTRAVENOUS; SUBCUTANEOUS at 10:17

## 2019-06-13 RX ADMIN — ARFORMOTEROL TARTRATE 15 MCG: 15 SOLUTION RESPIRATORY (INHALATION) at 07:44

## 2019-06-13 RX ADMIN — HYDROMORPHONE HYDROCHLORIDE 1 MG: 1 INJECTION, SOLUTION INTRAMUSCULAR; INTRAVENOUS; SUBCUTANEOUS at 05:58

## 2019-06-13 RX ADMIN — Medication 10 ML: at 20:10

## 2019-06-13 NOTE — PROGRESS NOTES
Patient tolerating ice chips and oral medications. Patient has walked and been up to the chair. Patient voided 100 cc so far. Patient's pain starting to become under control with IV Dilaudid. Patient using Incentive spirometer 10x hour and getting it to 2250. Patient has had some nausea but no emesis reported.

## 2019-06-13 NOTE — PROGRESS NOTES
Progress Note    Patient: Fernando Austin MRN: [id number]  SSN: [id number]    YOB: 1980  Age: 34 y.o. Sex: female      Admit Date: 2019    1 Day Post-Op    Procedure:  Procedure(s):  LAPAROSCOPIC GASTRIC BYPASS WITH ENDOSCOPY WITH ERAS PROTOCOL    Subjective:     Patient notes improving pain control; she has started bariatric liquids; ambulating, performing spirometry. Objective:     Visit Vitals  /75 (BP 1 Location: Right arm, BP Patient Position: At rest)   Pulse 82   Temp 98.1 °F (36.7 °C)   Resp 20   Ht 5' 8\" (1.727 m)   Wt (!) 392 lb 6.7 oz (178 kg)   SpO2 97%   BMI 59.67 kg/m²       Temp (24hrs), Av.9 °F (36.6 °C), Min:97.3 °F (36.3 °C), Max:98.6 °F (37 °C)      Physical Exam:    LUNG: clear to auscultation bilaterally, HEART: regular rate and rhythm, S1, S2 normal, no murmur. ABDOMEN: Obese, non-distended, soft. Wounds dry and intact. Serous drain fluid; appropriate incisional pain with palpation.     Data Review: VS, I/O's, Labs    Lab Review:   Recent Results (from the past 12 hour(s))   METABOLIC PANEL, BASIC    Collection Time: 19  2:33 AM   Result Value Ref Range    Sodium 137 136 - 145 mmol/L    Potassium 4.0 3.5 - 5.1 mmol/L    Chloride 106 97 - 108 mmol/L    CO2 25 21 - 32 mmol/L    Anion gap 6 5 - 15 mmol/L    Glucose 109 (H) 65 - 100 mg/dL    BUN 12 6 - 20 MG/DL    Creatinine 1.03 (H) 0.55 - 1.02 MG/DL    BUN/Creatinine ratio 12 12 - 20      GFR est AA >60 >60 ml/min/1.73m2    GFR est non-AA 59 (L) >60 ml/min/1.73m2    Calcium 8.6 8.5 - 10.1 MG/DL   CBC WITH AUTOMATED DIFF    Collection Time: 19  2:33 AM   Result Value Ref Range    WBC 20.4 (H) 3.6 - 11.0 K/uL    RBC 4.45 3.80 - 5.20 M/uL    HGB 12.6 11.5 - 16.0 g/dL    HCT 40.3 35.0 - 47.0 %    MCV 90.6 80.0 - 99.0 FL    MCH 28.3 26.0 - 34.0 PG    MCHC 31.3 30.0 - 36.5 g/dL    RDW 14.5 11.5 - 14.5 %    PLATELET 097 886 - 522 K/uL    MPV 8.8 (L) 8.9 - 12.9 FL    NRBC 0.0 0  WBC    ABSOLUTE NRBC 0. 00 0.00 - 0.01 K/uL    NEUTROPHILS 85 (H) 32 - 75 %    LYMPHOCYTES 7 (L) 12 - 49 %    MONOCYTES 7 5 - 13 %    EOSINOPHILS 0 0 - 7 %    BASOPHILS 0 0 - 1 %    IMMATURE GRANULOCYTES 1 (H) 0.0 - 0.5 %    ABS. NEUTROPHILS 17.4 (H) 1.8 - 8.0 K/UL    ABS. LYMPHOCYTES 1.4 0.8 - 3.5 K/UL    ABS. MONOCYTES 1.5 (H) 0.0 - 1.0 K/UL    ABS. EOSINOPHILS 0.0 0.0 - 0.4 K/UL    ABS. BASOPHILS 0.0 0.0 - 0.1 K/UL    ABS. IMM. GRANS. 0.1 (H) 0.00 - 0.04 K/UL    DF AUTOMATED         Assessment:     Hospital Problems  Date Reviewed: 3/7/2019          Codes Class Noted POA    [facility] ([facility] ICD-10-CM: E66.01  ICD-9-CM: 278.01  2019 Unknown              Plan/Recommendations/Medical Decision Makin. Continue bariatric liquids-goal 4 oz/hr. 2. Oral analgesics. 3. Spirometry, ambulation. 4. DVT prophylaxis.

## 2019-06-13 NOTE — PROGRESS NOTES
Transition of Care Plan:         -PT consult  -Potential home health need  -Discharge home with family soon      Cm met with patient at bedside to explain role and offer support. Patient stated she was still in a lot of pain, claiming she was trying to wait on consuming her liquids until after taking her pain medication, in hopes that it would help more. Cm offered reassurance that it would get better. Patient lives with her aunt, father and her 3year-old daughter. She is a part-time student, unemployed. Patient requested I order a walker for her. Cm suggested we have PT evaluate her for appropriateness first.                  Reason for Admission:   Gastric bypass                   RRAT Score:     4                Plan for utilizing home health:    TBD                      Current Advanced Directive/Advance Care Plan: None on file, patient not interested at this time                         Care Management Interventions  PCP Verified by CM: Yes  Palliative Care Criteria Met (RRAT>21 & CHF Dx)?: No  Mode of Transport at Discharge: Other (see comment)  MyChart Signup: No  Discharge Durable Medical Equipment: No  Health Maintenance Reviewed: Yes  Physical Therapy Consult: Yes(patient requested walker)  Occupational Therapy Consult: No  Speech Therapy Consult: No  Current Support Network: Relative's Home  Confirm Follow Up Transport: Family  Plan discussed with Pt/Family/Caregiver: Yes  Freedom of Choice Offered: Yes   Resource Information Provided?: No  Discharge Location  Discharge Placement: Home with family assistance     Ruba Caldwell

## 2019-06-13 NOTE — PROGRESS NOTES
Bedside, Verbal shift change report given to Alin Ruggiero RN (oncoming nurse) by Panda Pastor RN (offgoing nurse). Report included the following information SBAR, Kardex, OR Summary, Procedure Summary, Intake/Output, MAR and Recent Results.

## 2019-06-13 NOTE — ROUTINE PROCESS
Patient up in chair this morning and almost done with one round of bariatric liquids and tolerating fine. Doing Incentive spirometry. Bedside shift change report given to Doris Whatley (oncoming nurse) by Tim Pacheco (offgoing nurse). Report included the following information SBAR, Kardex, Intake/Output, MAR and Recent Results.

## 2019-06-13 NOTE — OP NOTES
[address] [facility]  OPERATIVE REPORT    Name:  Cassandra Fleming  MR#:  [id number]  :  1980  ACCOUNT #:  [de-identified]  DATE OF SERVICE:  2019    PRIMARY PREOPERATIVE DIAGNOSIS:  Morbid obesity. SECONDARY PREOPERATIVE DIAGNOSES:  1. Gastroesophageal reflux disease. 2.  Degenerative joint disease. 3.  Asthma. 4.  Hypothyroidism. POSTOPERATIVE DIAGNOSES:  1. Gastroesophageal reflux disease. 2.  Degenerative joint disease. 3.  Asthma. 4.  Hypothyroidism. PROCEDURES PERFORMED:  1. Laparoscopic Soila-en-Y gastric bypass. 2.  Intraoperative upper endoscopy. SURGEON:  Faustino Tang MD    ASSISTANT:  GARRETT Walters    ANESTHESIA:  General endotracheal.    COMPLICATIONS:  None. SPECIMENS REMOVED:  Segment of small intestine with anastomotic donuts. IMPLANTS:  None. ESTIMATED BLOOD LOSS:  60 mL. DRAIN:  A 19-mm Juan drain. COUNTS:  Sponge count correct. Needle count correct. INDICATIONS:  The patient is a 47-year-old white female with a height of 68 inches, weight of 392 pounds, with a resultant body mass index of 59.7 kg per meter square on a medium frame. She has the above-listed obesity-related conditions. All medical efforts at weight loss have been unsuccessful. After extensive preoperative counseling, patient education, and medical screening, it was felt she would be a good candidate for weight reduction surgery. She presents to [facility] today for laparoscopic gastric bypass. I have asked GARRETT Walters to assist with the procedure given the technical complexity of laparoscopic bariatric surgery. She will run the laparoscope, assist in creation of the jejunojejunostomy, and assist in creation of both the gastric pouch and gastrojejunostomy. FINDINGS:  1. Creation of a 30 mL gastric pouch with 879 cm antecolic, antegastric Soila limb. 2.  No insufflation air leak on upper endoscopy.   Small bleeding point controlled with transfixing suture with resolution of anastomotic bleeding. PROCEDURE:  The patient was identified as the correct patient in the preoperative holding area and informed consent was confirmed. After answering the patient's remaining questions and confirming she had received 40 mg of Lovenox subcutaneously, she was taken to the operating room and placed on the operating room table in the supine position. Sequential compression devices were placed on both lower extremities. Following the uneventful initiation of general anesthesia, she was carefully secured to the operating room table with footboard and safety strap in place. All potential pressure points were padded with egg crate. Her abdomen was prepped and draped in usual sterile fashion. Final time-out was performed, and it was confirmed she had received intravenous antibiotics. A 5-mm trocar was inserted through a small right upper quadrant skin incision using an Optiview technique. After confirming intraperitoneal location of the trocar tip, insufflation with carbon dioxide gas was initiated. Once adequate working sites had been developed, a 5-mm, 30-degree laparoscope was inserted. No signs of trocar injury were present. The liver was noted be somewhat enlarged. A 5-mm trocar was inserted 4 cm superior to the umbilicus using visual guidance with the laparoscope. Two left upper quadrant trocars, one 5 mm and another 15 mm, were inserted using identical technique. A right upper quadrant 12-mm trocar was inserted using visual guidance with the laparoscope. The omentum was retracted into the upper abdomen and the omentum was serially ligated and divided from its free edge to the antimesenteric border of the transverse colon using the Harmonic scalpel. With the transverse colon elevated anteriorly and cephalad, the ligament of Treitz was identified and 100 cm of bowel were measured distal to this landmark.   The jejunum was divided at this point using a tan load linear stapler firing. The distal edge of the transected bowel was marked with 0 Surgidac suture. The small bowel mesentery was mobilized using the Harmonic scalpel. Once adequate mobility of the alimentary limb had been achieved, an additional 110 cm of bowel were measured distal to this transection site. This segment of bowel was brought alongside the biliopancreatic limb in side-to-side fashion. This orientation was maintained with 0 Surgidac stay sutures. The Harmonic scalpel was used to make an opening in each segment of bowel, through which a 60-mm tan load linear stapler was carefully inserted in to each segment of bowel. After confirming correct insertion of the stapler, it was closed and fired. The staple line was noted to be hemostatic. The remaining opening in the bowel was closed with running 2-0 Polysorb suture. A tension relieving, anti-obstruction suture was placed at the distal edge of the staple line. The mesenteric defect was closed with a running 0 Surgidac suture. The Soila limb was delivered into the upper abdomen with care to avoid twisting of bowel or its blood supply. The patient was placed in the steep reverse Trendelenburg position. The [facility] liver retractor was inserted through a small subxiphoid incision. With the liver retracted anteriorly and cephalad, the esophageal hiatus was visualized. No signs of hiatal hernia were present. The fat pad was freed from the left ariel of the diaphragm using the Harmonic scalpel and blunt dissection. This likewise freed the angle of His from the diaphragm. The pars flaccida portion of the gastrohepatic ligament was incised using the Harmonic scalpel. This allowed atraumatic entry into the lesser sac and retrogastric area. A site 4 cm distal to the gastroesophageal junction was chosen. The gastrohepatic ligament was serially ligated and divided at this level using the Harmonic scalpel.   A 30 mL gastric pouch was constructed with multiple firings of purple load linear stapler firings using Seamguard reinforcement material.  The calibration tube was used to size pouch  construction. Following pouch construction, the calibration tube was removed. A wound protector was placed at the left side with 50-mm fascial defect. A nasogastric tube attached to a 25 EEA stapler anvil was passed transorally, with the tip of the tubing positioned 1 cm anterior to the transverse pouch staple line. Electrocautery was used to make an opening at the site, and the tube was delivered in to the abdominal space with anesthesia staff providing jaw thrust.  The tube was  from the anvil once the anvil had been positioned in the pouch, and the tube was removed from the patient's body. The Soila limb was delivered in to the left upper quadrant with care to avoid twisting of the bowel or its blood supply. The Harmonic scalpel was used to make an opening in the Soila limb, and through this opening a 25 EEA stapler was passed intraluminally to a healthy site of bowel. The stapler spike was deployed to the antimesenteric border of the Soila limb and mated with the anvil within the gastric pouch. The stapler was slowly closed with care to avoid twisting of the pouch for incorporation of extraneous tissue. After confirming correct stapler closure, it was fired and removed. Both proximal and distal donuts were noted to be intact. The open redundant segment of the Soila limb was amputated with a tan load linear stapler firing once the blood supply had been controlled using the Harmonic scalpel. This segment of bowel was placed within a retrieval bag and removed from the patient's body. Once pneumoperitoneum had been reestablished, tension relieving sutures were placed between the antimesenteric border of the Soila limb, the gastric pouch, and the excluded stomach. An intestinal clamp was placed on the Soila limb distal to the anastomosis.   The patient was placed in the supine position. Sterile saline was instilled into the upper abdomen. Intraoperative upper endoscopy was performed. The gastroscope was passed transorally, through the gastroesophageal junction, and in to the gastric pouch. With insufflation using the gastroscope, adequate pouch and Soila limb distention were noted. No insufflation air leak occurred. A small bleeding point was identified at the 1 o'clock intraluminal view of the anastomosis. This correlated with the lesser curve aspect of the anastomosis from a laparoscopic perspective. The bowel was decompressed, followed by placement of a figure-of-eight 3-0 Vicryl suture at this location. Intraoperative upper endoscopy was repeated, with resolution of the small bleeding point following transfixing suture placement. The pouch and Soila limb were decompressed and the gastroscope was removed. Sterile saline was evacuated from the upper abdomen. The intestinal clamp was removed. Era Britta space was closed with a running 0 Surgidac suture. A 19-mm Juan drain was inserted into the abdominal space. This allowed to lie adjacent to the gastrojejunal anastomosis and brought out through the left subcostal 5-mm trocar wound. It was secured to the skin with a 2-0 nylon suture. After confirming adequate hemostasis, the Claire liver retractor was removed, followed by removal of the wound protector. My assistant then changed gloves. The 15-mm fascial defect was closed with 0 Vicryl suture using a laparoscopic suture passer. Pneumoperitoneum was released, and all trocars were removed. All wounds were infiltrated with 0.5% Marcaine without epinephrine. All skin edges were reapproximated with a combination of subcuticular 4-0 Monocryl suture and Dermabond. The patient tolerated the procedure well. She was extubated in the operating room and transported to the recovery area in stable condition.   The attending surgeon, Dr. Maty Bower, was scrubbed and present for the entire procedure.       Edmund Morrison MD      [id number]  D:  06/12/2019 19:58  T:  06/12/2019 20:04  JOB #:  [id number]

## 2019-06-13 NOTE — PROGRESS NOTES
Patient requesting a walker for mobility at home and in the hospital. Will order a Pt consult.    Bere Pitt NP

## 2019-06-13 NOTE — DISCHARGE INSTRUCTIONS
Gastric Bypass  Patient Discharge Instructions  [facility]  [phone]    1. Activities: You may be active walking, stair climbing, and doing light weight activities with one to two-pound weights, sitting in a chair using different arm motions. Major restrictions include driving until your first office visit and lifting anything heavier than ten pounds. It is very important that you walk frequently. In addition to walking, you should continue to use your incentive spirometer (breathing exercises) throughout the day. 2. Caring for vour incision (s}: Your surgical incision(s) will be covered with Derma bond (super glue). You may shower as desired and simply pat dry the area over the incision(s). There may occasionally be seepage of yellowish to yellowish-maroon dissolved fat from your wound, which is of no major concern as long as the wound is not red, hardened in the area of the drainage, or if the drainage has a foul smell. If there are any questions, call our office for further instructions. If there is this type of dissolved fat drainage,  the shower and gently express the fluid from your wound. Then keep gauze pads over the area to protect both the wound and your clothes. 3. When to call the office immediately:      *Chest pain (not associated with eating/drinking)  *Shortness of breath (more than normal)  *Sudden pain and/or redness in calf  *Fever greater than 101F  *Persistent nausea and/or vomiting (unable to keep down any liquids)  *Bleeding from incision(s)  *Severe abdominal pain  *Any other concerning symptoms    4. Diet: There are three main priorities as far as your diet is concerned---    a. Clear Liquids-drink from 1 oz. cups or standard shot glass. These liquids are non-carbonated, no added sugar, and not irritating to your stomach.  They may include water, tea, coffee\" 100% no added sugar juices (avoid citrus) , clear broth, blenderized clear soups such as [employer] Healthy Request Chicken Noodle and Chicken & Rice, Sugar-free products including popsicles, Martin-Aid, and Crystal Lite. b. Vitamins: Multi-vitamin with iron in the morning and evening, making sure  it's not the first thing or the last thing taken. The other Vitamin B-12 and  vitamin A & D capsule may be taken once during the day anytime that you  can make a routine of it.    c. Protein Intake: During your initial two-three weeks when your body is switching from burning glucose to directly burning fat, there is an attempt by your body to use protein as a fuel. To protect that protein, we like you to exercise as listed above, and to try to take in 50-60 grams of protein per day. This can be done with four 8 oz. serving of skim milk and Low Carb [place] Instant Breakfast. Each 8 oz. should be considered a meal-breakfast, lunch, or supper, and during this mealtime it should be the sole ingested substance. Stop your clear liquids for 20 minutes before your start on the instant breakfast, which is sipped 1 oz. at a time. You may also try the following substitute for [place] Instant Breakfast: skim milk-fat free powdered milk + Egg Beaters + flavor extract. If desired, ice may be added and blenderized in the . If the milk upsets your stomach, you may purchase Lactaid tablets from any drug store. Lactaid skim milk may be purchased at most major supermarkets. Another alternative is Boost Diabetic, which is Lactose-free and ready to drink. There are many protein supplements on the market. Whey and Soy based protein powders/drinks are acceptable. If you have any questions about specific products please call the office. If you are having difficulty with your diet, please call the office. 5. Medications: Take no more than 2 pills at a time. Wait 20 minutes between pills. a. Vitamins as listed above---multi-vitamin with iron twice a day, B-12 once a day, vitamin A& D once a day.     b. Acid reducing medicine--Will be prescribed by your surgeon at discharge. c. Mylanta Plus--one to two teaspoons as needed for belching or gas (other gas relieving medicines are also acceptable Beano, GasX, etc). d. Bowel Regulation--mild laxatives are permissible such as Milk of Magnesia, Dulcolax (either by mouth or suppository). If you are accustomed to using a [id number] laxative not mentioned, you may continue to use it. Fibercon tablets or Benefiber may be taken as a fiber supplement to regulate bowel movements. Fibercon tablets should be broken in half and taken in half and taken one half in the morning and one half in the evening. Benefiber (1 tsp.) can be added to your protein drink(s). It has no taste or added thickness. Imodium AD may be taken as needed for diarrhea.    e. Pain medication-one to two every four hours as needed for pain control. If pain is mild, try extra-strength Tylenol first.    f. Do not take any pain or arthritis medication such as Aspirin, Advil, Aleve, Naprosyn, or any other nonsteroidal anti-inflammatory medication unless approved by your surgeon. A Tylenol product is OK.    g. Preadmission medications may be resumed, but this should be discussed without your doctor before your discharge from the hospital.    Future Appointments   Date Time Provider Rosina Nava   6/26/2019  2:20 PM CHIKIS Anton   7/8/2019  2:40 PM CHIKIS Ramos   7/22/2019  2:40 PM CHIKIS Anton       Patient Education        Enoxaparin (Lovenox): Care Instructions  Your Care Instructions      Enoxaparin (Lovenox) is an anticoagulant medicine. It is one of a class of anticoagulants called low molecular weight heparin. Many people call these medicines blood thinners. They don't actually thin the blood, but they increase the time it takes a blood clot to form.  This reduces the chance of a blood clot in the leg veins (deep vein thrombosis) or in the lungs (pulmonary embolism). Enoxaparin is a shot (injection). You or someone caring for you will inject it once or twice a day. Most people need shots for 5 to 10 days, but in some cases it can be longer. Your doctor will tell you how long you need to have the shots. Enoxaparin is used to:  · Treat deep vein thrombosis (DVT), which is a blood clot in the legs, pelvis, or arms. · Reduce the chance of getting blood clots after certain surgeries. For example, you may take enoxaparin after knee or hip replacement surgery. · Reduce the chance of getting blood clots in people who are likely to get them and who are not active for a long period of time. For example, you may need enoxaparin if you need to stay in bed for a long time because of a health problem. · Reduce the chance of blood clots when another blood thinner is stopped for a short time. For example, if you take warfarin and need surgery, your doctor may ask you to stop taking warfarin for a short time before the surgery. You will take enoxaparin to help prevent blood clots before the surgery. After the surgery, your doctor will tell you when it is safe to start taking warfarin again. This is called bridge therapy. Follow-up care is a key part of your treatment and safety. Be sure to make and go to all appointments, and call your doctor if you are having problems. It's also a good idea to know your test results and keep a list of the medicines you take. How can you care for yourself at home? How to inject enoxaparin  You will get a prescription for prefilled syringes. Inject the medicine at the same time every day unless your doctor gives you other instructions. 1. Wash and dry your hands. 2. Sit or lie in a position that lets you see your belly. 3. Clean the injection site with an alcohol pad or swab, and let it dry. Choose a site on the right or left side of your belly, at least 2 inches from your belly button.  Change the site each time you inject the medicine. 4. Remove the needle cap by pulling it straight off. Don't twist it. 5. Hold the syringe like a pencil in one hand. With the other hand, pinch an area of the injection site skin. You should have a \"fold\" in the skin. 6. Insert the entire needle straight down into the fold of skin. Don't insert the needle at an angle. 7. Press the plunger with your thumb until the syringe is empty. 8. Pull the needle straight out and let go of the skin. 9. Point the needle away from you and press down on the plunger. The needle will be covered. Take precautions  · Don't rub the injection site. This could cause bruising. · Don't push air bubbles out of the syringe unless your doctor tells you to. Each syringe comes with air bubbles. · Don't stop taking enoxaparin without talking to your doctor. · If you are taking a blood thinner, be sure you get instructions about how to take your medicine safely. Blood thinners can cause serious bleeding problems. · Talk to your doctor before you take any prescription medicines, over-the-counter medicines, antibiotics, vitamins, or herbal products. · Don't take the following medicines unless your doctor says it's okay:  ? Aspirin, products like aspirin (salicylates), or products that contain aspirin  ? Nonsteroidal anti-inflammatory drugs (NSAIDs), such as ibuprofen (Advil, Motrin) and naproxen (Aleve)  · Store enoxaparin at room temperature. Don't put it in the refrigerator or freezer. When should you call for help? Call 911 anytime you think you may need emergency care. For example, call if:    · You passed out (lost consciousness).     · You have signs of severe bleeding, such as:  ? A severe headache that is different from past headaches. ? Vomiting blood or what looks like coffee grounds.   ? Passing maroon or very bloody stools.    Call your doctor now or seek immediate medical care if:    · You have unexpected bleeding, including:  ? Blood in stools or black stools that look like tar.  ? Blood in your urine. ? Bruises or blood spots under the skin.     · You feel dizzy or lightheaded.    Watch closely for changes in your health, and be sure to contact your doctor if:    · You do not get better as expected. Where can you learn more? Go to http://arlene-joann.info/. Enter P266 in the search box to learn more about \"Enoxaparin (Lovenox): Care Instructions. \"  Current as of: July 22, 2018  Content Version: 11.9  © 0934-1016 [employer]. Care instructions adapted under license by [employer] (which disclaims liability or warranty for this information). If you have questions about a medical condition or this instruction, always ask your healthcare professional. [address] any warranty or liability for your use of this information.

## 2019-06-14 VITALS
DIASTOLIC BLOOD PRESSURE: 70 MMHG | HEIGHT: 68 IN | BODY MASS INDEX: 44.41 KG/M2 | RESPIRATION RATE: 18 BRPM | OXYGEN SATURATION: 93 % | WEIGHT: 293 LBS | TEMPERATURE: 98.5 F | HEART RATE: 103 BPM | SYSTOLIC BLOOD PRESSURE: 105 MMHG

## 2019-06-14 LAB
ANION GAP SERPL CALC-SCNC: 7 MMOL/L (ref 5–15)
BASOPHILS # BLD: 0 K/UL (ref 0–0.1)
BASOPHILS NFR BLD: 0 % (ref 0–1)
BUN SERPL-MCNC: 8 MG/DL (ref 6–20)
BUN/CREAT SERPL: 12 (ref 12–20)
CALCIUM SERPL-MCNC: 8.2 MG/DL (ref 8.5–10.1)
CHLORIDE SERPL-SCNC: 106 MMOL/L (ref 97–108)
CO2 SERPL-SCNC: 26 MMOL/L (ref 21–32)
CREAT SERPL-MCNC: 0.67 MG/DL (ref 0.55–1.02)
DIFFERENTIAL METHOD BLD: ABNORMAL
EOSINOPHIL # BLD: 0 K/UL (ref 0–0.4)
EOSINOPHIL NFR BLD: 0 % (ref 0–7)
ERYTHROCYTE [DISTWIDTH] IN BLOOD BY AUTOMATED COUNT: 14.5 % (ref 11.5–14.5)
GLUCOSE SERPL-MCNC: 80 MG/DL (ref 65–100)
HCT VFR BLD AUTO: 37.3 % (ref 35–47)
HGB BLD-MCNC: 11.5 G/DL (ref 11.5–16)
IMM GRANULOCYTES # BLD AUTO: 0.1 K/UL (ref 0–0.04)
IMM GRANULOCYTES NFR BLD AUTO: 1 % (ref 0–0.5)
LYMPHOCYTES # BLD: 1.6 K/UL (ref 0.8–3.5)
LYMPHOCYTES NFR BLD: 13 % (ref 12–49)
MCH RBC QN AUTO: 28 PG (ref 26–34)
MCHC RBC AUTO-ENTMCNC: 30.8 G/DL (ref 30–36.5)
MCV RBC AUTO: 91 FL (ref 80–99)
MONOCYTES # BLD: 1 K/UL (ref 0–1)
MONOCYTES NFR BLD: 8 % (ref 5–13)
NEUTS SEG # BLD: 9.6 K/UL (ref 1.8–8)
NEUTS SEG NFR BLD: 78 % (ref 32–75)
NRBC # BLD: 0.02 K/UL (ref 0–0.01)
NRBC BLD-RTO: 0.2 PER 100 WBC
PLATELET # BLD AUTO: 317 K/UL (ref 150–400)
PMV BLD AUTO: 9 FL (ref 8.9–12.9)
POTASSIUM SERPL-SCNC: 3.5 MMOL/L (ref 3.5–5.1)
RBC # BLD AUTO: 4.1 M/UL (ref 3.8–5.2)
SODIUM SERPL-SCNC: 139 MMOL/L (ref 136–145)
WBC # BLD AUTO: 12.3 K/UL (ref 3.6–11)

## 2019-06-14 PROCEDURE — 74011000250 HC RX REV CODE- 250: Performed by: SURGERY

## 2019-06-14 PROCEDURE — 80048 BASIC METABOLIC PNL TOTAL CA: CPT

## 2019-06-14 PROCEDURE — 36415 COLL VENOUS BLD VENIPUNCTURE: CPT

## 2019-06-14 PROCEDURE — 94640 AIRWAY INHALATION TREATMENT: CPT

## 2019-06-14 PROCEDURE — 74011250637 HC RX REV CODE- 250/637: Performed by: PHYSICIAN ASSISTANT

## 2019-06-14 PROCEDURE — 97161 PT EVAL LOW COMPLEX 20 MIN: CPT

## 2019-06-14 PROCEDURE — 85025 COMPLETE CBC W/AUTO DIFF WBC: CPT

## 2019-06-14 PROCEDURE — 74011250636 HC RX REV CODE- 250/636: Performed by: SURGERY

## 2019-06-14 PROCEDURE — 74011250637 HC RX REV CODE- 250/637: Performed by: SURGERY

## 2019-06-14 PROCEDURE — 97116 GAIT TRAINING THERAPY: CPT

## 2019-06-14 RX ORDER — HYDROCODONE BITARTRATE AND ACETAMINOPHEN 10; 325 MG/1; MG/1
1 TABLET ORAL
Qty: 28 TAB | Refills: 0 | Status: SHIPPED | OUTPATIENT
Start: 2019-06-14 | End: 2019-06-21

## 2019-06-14 RX ORDER — HYDROCODONE BITARTRATE AND ACETAMINOPHEN 10; 325 MG/1; MG/1
1 TABLET ORAL
Status: DISCONTINUED | OUTPATIENT
Start: 2019-06-14 | End: 2019-06-14 | Stop reason: HOSPADM

## 2019-06-14 RX ADMIN — BUDESONIDE 500 MCG: 0.5 INHALANT RESPIRATORY (INHALATION) at 07:43

## 2019-06-14 RX ADMIN — ENOXAPARIN SODIUM 40 MG: 40 INJECTION SUBCUTANEOUS at 10:08

## 2019-06-14 RX ADMIN — HYDROMORPHONE HYDROCHLORIDE 1 MG: 1 INJECTION, SOLUTION INTRAMUSCULAR; INTRAVENOUS; SUBCUTANEOUS at 01:12

## 2019-06-14 RX ADMIN — HYDROMORPHONE HYDROCHLORIDE 1 MG: 1 INJECTION, SOLUTION INTRAMUSCULAR; INTRAVENOUS; SUBCUTANEOUS at 04:57

## 2019-06-14 RX ADMIN — ONDANSETRON 4 MG: 2 INJECTION INTRAMUSCULAR; INTRAVENOUS at 10:45

## 2019-06-14 RX ADMIN — SODIUM CHLORIDE, SODIUM LACTATE, POTASSIUM CHLORIDE, AND CALCIUM CHLORIDE 125 ML/HR: 600; 310; 30; 20 INJECTION, SOLUTION INTRAVENOUS at 05:03

## 2019-06-14 RX ADMIN — ONDANSETRON 4 MG: 2 INJECTION INTRAMUSCULAR; INTRAVENOUS at 04:57

## 2019-06-14 RX ADMIN — ACETAMINOPHEN 1000 MG: 500 TABLET ORAL at 02:25

## 2019-06-14 RX ADMIN — LEVOTHYROXINE SODIUM 175 MCG: 150 TABLET ORAL at 06:47

## 2019-06-14 RX ADMIN — HYDROCODONE BITARTRATE AND ACETAMINOPHEN 1 TABLET: 10; 325 TABLET ORAL at 10:04

## 2019-06-14 RX ADMIN — ARFORMOTEROL TARTRATE 15 MCG: 15 SOLUTION RESPIRATORY (INHALATION) at 07:42

## 2019-06-14 RX ADMIN — GABAPENTIN 200 MG: 100 CAPSULE ORAL at 10:05

## 2019-06-14 RX ADMIN — ACETAMINOPHEN 500 MG: 500 TABLET ORAL at 10:05

## 2019-06-14 RX ADMIN — HYOSCYAMINE SULFATE 0.12 MG: 0.12 TABLET ORAL at 04:57

## 2019-06-14 NOTE — DISCHARGE SUMMARY
Physician Discharge Summary     Patient ID:  Irineo Galeazzi  [id number]  20 y.o.  1980    Allergies: Percocet [oxycodone-acetaminophen]    Admit Date: 6/12/2019    Discharge Date: 6/14/2019    * Admission Diagnoses: Morbid obesity ([facility] [E66.01]    * Discharge Diagnoses:    Hospital Problems as of 6/14/2019 Date Reviewed: 3/7/2019          Codes Class Noted - Resolved POA    Morbid obesity ([facility] ICD-10-CM: E66.01  ICD-9-CM: 278.01  6/12/2019 - Present Unknown               Admission Condition: Good    * Discharge Condition: good    * Procedures: Procedure(s):  LAPAROSCOPIC GASTRIC BYPASS WITH ENDOSCOPY WITH ERAS PROTOCOL  ESOPHAGOGASTRODUODENOSCOPY (EGD)    * Hospital Course:   Normal hospital course for this procedure. Pt started bariatric liquids without issue  Seen by Dietician to review diet and vitamins  Transitioned to oral analgesics and pain well controlled   Pt had MINA drain removed and DC to home  POD 2 doing well with follow up appointment in place        Consults: nutrition     Significant Diagnostic Studies: na    * Disposition: Home    Discharge Medications:   Current Discharge Medication List      START taking these medications    Details   HYDROmorphone (DILAUDID) 2 mg tablet Take 1-2 Tabs by mouth every four (4) hours as needed for Pain for up to 5 days. Max Daily Amount: 24 mg. Qty: 30 Tab, Refills: 0    Associated Diagnoses: S/P gastric bypass         CONTINUE these medications which have NOT CHANGED    Details   levothyroxine (SYNTHROID) 175 mcg tablet Take 175 mcg by mouth Daily (before breakfast). fluticasone furoate-vilanterol (BREO ELLIPTA) 100-25 mcg/dose inhaler Take 1 Puff by inhalation daily. Indications: AT 3PM      enoxaparin (LOVENOX) 40 mg/0.4 mL 0.4 mL by SubCUTAneous route daily. Qty: 14 Syringe, Refills: 0      etonogestrel (NEXPLANON) 68 mg impl by SubDERmal route. BIRTH CONTROL - 5/23/18 INSERTION DATE      famotidine (PEPCID) 20 mg tablet Take 20 mg by mouth daily. PROAIR HFA 90 mcg/actuation inhaler Take 2 Puffs by inhalation every four (4) hours as needed for Wheezing or Shortness of Breath. Every 4 to 6 hours as needed  Refills: 5      albuterol (PROVENTIL VENTOLIN) 2.5 mg /3 mL (0.083 %) nebulizer solution 3 mL by Nebulization route every four (4) hours as needed for Wheezing. Qty: 24 Each, Refills: 0      doxycycline (VIBRA-TABS) 100 mg tablet Take 100 mg by mouth as needed (ACNE). ondansetron (ZOFRAN ODT) 4 mg disintegrating tablet Take 1 Tab by mouth every eight (8) hours as needed for Nausea. Qty: 30 Tab, Refills: 0      ergocalciferol (ERGOCALCIFEROL) 50,000 unit capsule Take 1 Cap by mouth every seven (7) days. Qty: 4 Cap, Refills: 2      acetaminophen (TYLENOL EXTRA STRENGTH) 500 mg tablet Take 1,500 mg by mouth every six (6) hours as needed for Pain. STOP taking these medications       HYDROcodone-acetaminophen (NORCO)  mg tablet Comments:   Reason for Stopping:               * Follow-up Care/Patient Instructions:   Activity: No driving while on analgesics and No heavy lifting for 4 weeks  Diet: Bariatric full liquids for 2 weeks post op  Wound Care: Keep wound clean and dry    Future Appointments   Date Time Provider Rosina Nava   6/26/2019  2:20 PM CHIKIS Burleson   7/8/2019  2:40 PM CHIKIS Torres   7/22/2019  2:40 PM CHIKIS Burleson         Follow-up Information     Follow up With Specialties Details Why Contact Info    Analilia Pete MD [facility], [address] [address] [phone]          Follow-up tests/labs na    Signed:  GARRETT Cook  6/14/2019  8:30 AM

## 2019-06-14 NOTE — PROGRESS NOTES
Surgery Progress Note  Subjective:     Pt is POD#2  after laparoscopic gastric bypass procedure. Pt complains of some post op incisional pain, no acute issues overnight,   Today Pt's Pain is present - adequately treated  No SOB. No CP. Pt is ambulating. no nausea and no vomiting. no fever or chills. Pt is on Bariatric full liquid diet with good PO intake--feels a bit full at times but steadily drinking well     Objective:     Patient Vitals for the past 8 hrs:   BP Temp Pulse Resp SpO2   06/14/19 0743     93 %   06/14/19 0714 135/76 99.4 °F (37.4 °C) (!) 110 18 91 %   06/14/19 0435 110/67 98.3 °F (36.8 °C) 89 18 95 %     No intake/output data recorded. 06/12 1901 - 06/14 0700  In: 600 [P.O.:600]  Out: 3220 [Urine:3100; Drains:120]ip  Physical Exam:    General: alert, cooperative, no distress, OOb in the chair drinking water   Cardiac: normal S1 and S2  Lungs: Normal chest wall and respirations. Clear to auscultation.   Abdomen: soft, nondistended, tenderness mild - in the upper abdomen, without guarding, without rebound, MINA in place with ss output noted   Wounds:clean, dry, no drainage  Neuro: alert, oriented x 3, no defects noted in general exam.  Extremities: no edema      CBC:   Lab Results   Component Value Date/Time    WBC 12.3 (H) 06/14/2019 05:10 AM    RBC 4.10 06/14/2019 05:10 AM    HGB 11.5 06/14/2019 05:10 AM    HCT 37.3 06/14/2019 05:10 AM    PLATELET 647 83/57/7986 05:10 AM     BMP:   Lab Results   Component Value Date/Time    Glucose 80 06/14/2019 05:10 AM    Sodium 139 06/14/2019 05:10 AM    Potassium 3.5 06/14/2019 05:10 AM    Chloride 106 06/14/2019 05:10 AM    CO2 26 06/14/2019 05:10 AM    BUN 8 06/14/2019 05:10 AM    Creatinine 0.67 06/14/2019 05:10 AM    Calcium 8.2 (L) 06/14/2019 05:10 AM     CMP:  Lab Results   Component Value Date/Time    Glucose 80 06/14/2019 05:10 AM    Sodium 139 06/14/2019 05:10 AM    Potassium 3.5 06/14/2019 05:10 AM    Chloride 106 06/14/2019 05:10 AM    CO2 26 2019 05:10 AM    BUN 8 2019 05:10 AM    Creatinine 0.67 2019 05:10 AM    Calcium 8.2 (L) 2019 05:10 AM    Anion gap 7 2019 05:10 AM    BUN/Creatinine ratio 12 2019 05:10 AM    Alk. phosphatase 89 2019 11:23 AM    Protein, total 7.5 2019 11:23 AM    Albumin 3.6 2019 11:23 AM    Globulin 3.9 2019 11:23 AM    A-G Ratio 0.9 (L) 2019 11:23 AM     Radiology review: na      Assessment:   Pt is POD #2 after Laparoscopic RNY Gastric Bypass  Stable    Plan: Tolerating diet with good PO intake  Cleared for discharge to home today  walk in mae  Pain management  GI and DVT prophylaxis  D/C  MINA Drain  continue oral analgesics   labs are reviewed, up to date and normal  Home medications reviewed  All instructions including diet, medications, activity and follow-up reviewed with pt.  lovenox teaching completed, home on lovenox for 2 weeks   Dr. Paul Ruth covering   Milena Argueta PAC    Addendum 910 AM   After I visited with pt, she informed nursing staff that oral dilaudid does not control her pain, she is asking for IV dilaudid  and she feels that Norco 10mg works better for her--she took it after a  with good control,  Will change to Narco now and hopefully it works well for her.   Still on track for DC to home today   Chaparro Ritter

## 2019-06-14 NOTE — PROGRESS NOTES
Lovenox teaching completed. Patient confirmed understanding with adequate teach back and demonstration. Patient kit provided.

## 2019-06-14 NOTE — PROGRESS NOTES
Bedside shift change report given to Aaliyah Jim (oncoming nurse) by Esther Horne (offgoing nurse). Report included the following information SBAR, Kardex, Intake/Output, MAR and Recent Results.    t

## 2019-06-14 NOTE — PROGRESS NOTES
Transitions of Care Plan:  -Discharge home with family today  - no home health needs  -patient to purchase her own bariatric walker  -Follow up with surgery 06/26/19    SRuba Guerrero

## 2019-06-14 NOTE — PROGRESS NOTES
Bedside shift change report given to Faby De La Torre RN (oncoming nurse) by Ana Mccormick RN (offgoing nurse). Report included the following information SBAR and Kardex.

## 2019-06-14 NOTE — PROGRESS NOTES
PHYSICAL THERAPY EVALUATION WITH DISCHARGE  Patient: Linda Welch (2 y.o. female)  Date: 2019  Primary Diagnosis: Morbid obesity ([facility] [E66.01]  Procedure(s) (LRB):  LAPAROSCOPIC GASTRIC BYPASS WITH ENDOSCOPY WITH ERAS PROTOCOL (N/A)  ESOPHAGOGASTRODUODENOSCOPY (EGD) (N/A) 2 Days Post-Op   Precautions: fall       ASSESSMENT :   Based on the objective data described below, patient presents with Modified independent overall for functional mobility. Gait training completed at [facility], 70 feet and using a rolling walker. She has been ambulating longer distances in the hallway but pain limiting gait training today. She is steadier with a rolling walker and reports less discomfort in the surgical site. Will benefit from rolling walker at home. No other PT needs at discharge. The following are barriers to independence while in acute care:   -Cognitive and/or behavioral: none  -Medical condition: ROM, functional reach, functional endurance and pain tolerance    -Other:       Discharge recommendations: None     Equipment recommendations for successful discharge (if) home: rolling walker       PLAN :  Discharging further skilled acute physical therapy at this time. SUBJECTIVE:   Patient stated It feels better already.     OBJECTIVE DATA SUMMARY:   HISTORY:    Past Medical History:   Diagnosis Date    Adverse effect of anesthesia     WOKE UP DURING ENDOSCOPY WITH KETAMINE AND PATIENT REPORTS FEELING LIKE SHE WAS HALLUCINATING - BUT DID OK WITH PROPOFOL IN PAST WITH NO ISSUES PER PATIENT    Arthritis     BILATERAL KNEES- OA    Asthma     Chronic pain     KNEES    GERD (gastroesophageal reflux disease)     Hiatal hernia     Hypothyroid     Morbid obesity ([facility]     PUD (peptic ulcer disease)     PRE ULCER -    Second hand smoke exposure      Past Surgical History:   Procedure Laterality Date    HX  SECTION      HX ENDOSCOPY      HX GI  2016 2018    ENDOSCOPY     Prior Level of Function/Home Situation: lives with Aunt at home, independent with mobility  Personal factors and/or comorbidities impacting plan of care: pain         EXAMINATION/PRESENTATION/DECISION MAKING:   Critical Behavior:  Neurologic State: Alert, Appropriate for age       Range Of Motion:  AROM: Generally decreased, functional                       Strength:    Strength: Generally decreased, functional                    Tone & Sensation:          intact, WFL                        Coordination:   WFL  Functional Mobility:  Bed Mobility:     Supine to Sit: (OOB, educated on log roll technique)        Transfers:  Sit to Stand: Additional time;Modified independent  Stand to Sit: Modified independent                       Balance:   Sitting: Intact  Standing: Intact; With support  Ambulation/Gait Training:  Distance (ft): 70 Feet (ft)  Assistive Device: Walker, rolling  Ambulation - Level of Assistance: Modified independent        Gait Abnormalities: Antalgic;Decreased step clearance;Trunk sway increased        Base of Support: Widened     Speed/Mel: Slow        Stairs:     Stairs - Level of Assistance: (declined today)        Functional Measure:  Barthel Index:    Bathin  Bladder: 10  Bowels: 10  Groomin  Dressin  Feeding: 10  Mobility: 10  Stairs: 5  Toilet Use: 5  Transfer (Bed to Chair and Back): 10  Total: 70/100       Percentage of impairment   0%   1-19%   20-39%   40-59%   60-79%   80-99%   100%   Barthel Score 0-100 100 99-80 79-60 59-40 20-39 1-19   0     The Barthel ADL Index: Guidelines  1. The index should be used as a record of what a patient does, not as a record of what a patient could do. 2. The main aim is to establish degree of independence from any help, physical or verbal, however minor and for whatever reason. 3. The need for supervision renders the patient not independent. 4. A patient's performance should be established using the best available evidence.  Asking the patient, friends/relatives and nurses are the usual sources, but direct observation and common sense are also important. However direct testing is not needed. 5. Usually the patient's performance over the preceding 24-48 hours is important, but occasionally longer periods will be relevant. 6. Middle categories imply that the patient supplies over 50 per cent of the effort. 7. Use of aids to be independent is allowed. Zena Estrada., Barthel, DElizabethW. (4012). Functional evaluation: the Barthel Index. [address] [facility] (14)2. Pal Greer yolette YARON Carrillo, Ladan Dorsey., Elidia Barrera., [place], [address] [facility] (1999). Measuring the change indisability after inpatient rehabilitation; comparison of the responsiveness of the Barthel Index and Functional [place] Measure. Journal of Neurology, Neurosurgery, and Psychiatry, 66(4), 975-052. Casie Zayas NGINA.TORSTEN, ROSEMARY Hamlin, & Diana Yang M.A. (2004.) Assessment of post-stroke quality of life in cost-effectiveness studies: The usefulness of the Barthel Index and the EuroQoL-5D. Quality of Life Research, 13, 427-43       Pain:  Pre treatment: 7 /10   During treatment: 7/10  Post treatment:  7/10   Location: incision    Description:sharp   Aggravating factors: Activity Tolerance:   Good  Please refer to the flowsheet for vital signs taken during this treatment. After treatment patient left:   Up in chair  Call light within reach  RN notified     COMMUNICATION/EDUCATION:   The patients plan of care was discussed with: Registered Nurse.       Thank you for this referral.  Mat Crooks, PT   Time Calculation: 13 mins

## 2019-06-17 ENCOUNTER — TELEPHONE (OUTPATIENT)
Dept: SURGERY | Age: 39
End: 2019-06-17

## 2019-06-17 RX ORDER — ERGOCALCIFEROL 1.25 MG/1
CAPSULE ORAL
Qty: 4 CAP | Refills: 2 | Status: SHIPPED | OUTPATIENT
Start: 2019-06-17 | End: 2019-09-07 | Stop reason: SDUPTHER

## 2019-06-17 NOTE — TELEPHONE ENCOUNTER
Bariatric Post-Operative Phone Calls: 48 hour phone call    Diet:Question of any nausea and/or vomiting. Protein intake (goal is 60 grams of protein daily)   Poor____Fair____Good__x__Great____     Comment:____48 -60 ounces__________________________________________________________      ______________________________________________________________________    Hydration:Less than 32 ounces of water daily is fair to poor (Goal is 64 ounces per day)   Poor____ Fair____ Good____Great_x___    Comment:______________________________________________________________    ______________________________________________________________________      Ambulation:( walking at least 3 x week, for 15- 20 minutes)     Poor______ Fair______ Good____x__     Great______ Comment:__________________________________________________    ______________________________________________________________________      Urine Color: Question of any odor and color(should be kati, pale, and clear) Dark______ Amber______ Pale__x____      Clear______ Comment:___________________________________________________                           ________________________________________________________________    Bowel movements: Question of any constipation- haven't had any bowel movements for more than 3 days. This could be related to protein intake and/or narcotic pain medication usage. Comment:                                                           Moving her bowels                                                                   Pain: Left sided abdominal pain is normal (should be less than 3)  Question if pain medication is helpful.  10___ 9___ 8___ 7___ 6___ 5_x__ 4___ 3___     2___1___0___Comment:__pain medication helping_______________________________________________    ______________________________________________________________________      Incision: (No redness, pain, swelling or fever) Healing Well_x_____     Healed______Redness_________ Pain_________     Swelling_________ Fever__________(greater than 101 needs evaluation)    Comment:____________________________________________________________    ______________________________________________________________________  Use of incentive spirometer: Yes__x__       No           Next Appointment:____6/26/19__________                 Support Group: Yes___x___No______    Additional Comments:__doing on line support group__________________________________________________________    ____________________________________________________________________      If more than one parameter is not met or considered poor, nurse needs to discuss with provider recommend for patient to be seen in the office as soon as possible or refer to the provider for follow-up. Reinforce to patient to use bariatric educational booklet as guide. It is appropriate to refer patient to the nutritionist to discuss more in detail of diet and nutrition.

## 2019-06-17 NOTE — TELEPHONE ENCOUNTER
----- Message from Moreno Torres LPN sent at [id number]  8:58 AM EDT -----  Regardin days post op call      ----- Message -----  From: GARRETT Haynes  Sent: 2019   8:32 AM  To: Moreno Torres LPN  Subject: BC bypass DC home                        Bariatric Discharge Notice for Follow Up Call    Admit Date:     Pt is POD #2 s/p laparoscopic gastric bypass surgery and is being discharged to home today  after routine post op course, home on lovenox for 2 weeks     Thanks,  [employer]

## 2019-06-26 ENCOUNTER — OFFICE VISIT (OUTPATIENT)
Dept: SURGERY | Age: 39
End: 2019-06-26

## 2019-06-26 VITALS
HEART RATE: 111 BPM | TEMPERATURE: 98.4 F | HEIGHT: 68 IN | SYSTOLIC BLOOD PRESSURE: 110 MMHG | OXYGEN SATURATION: 98 % | RESPIRATION RATE: 20 BRPM | DIASTOLIC BLOOD PRESSURE: 84 MMHG | BODY MASS INDEX: 44.41 KG/M2 | WEIGHT: 293 LBS

## 2019-06-26 DIAGNOSIS — E66.01 MORBID OBESITY WITH BMI OF 50.0-59.9, ADULT (HCC): Primary | ICD-10-CM

## 2019-06-26 DIAGNOSIS — Z09 SURGICAL FOLLOWUP: ICD-10-CM

## 2019-06-26 RX ORDER — MULTIVITAMIN WITH IRON
1 TABLET ORAL DAILY
COMMUNITY

## 2019-06-26 RX ORDER — HYOSCYAMINE SULFATE 0.12 MG/1
0.12 TABLET SUBLINGUAL
Qty: 30 TAB | Refills: 0 | Status: SHIPPED | OUTPATIENT
Start: 2019-06-26

## 2019-06-26 RX ORDER — LANOLIN ALCOHOL/MO/W.PET/CERES
500 CREAM (GRAM) TOPICAL DAILY
COMMUNITY
End: 2020-07-31

## 2019-06-26 RX ORDER — CYCLOBENZAPRINE HCL 10 MG
10 TABLET ORAL
Qty: 20 TAB | Refills: 0 | Status: SHIPPED | OUTPATIENT
Start: 2019-06-26 | End: 2020-07-31

## 2019-06-26 NOTE — PROGRESS NOTES
1. Have you been to the ER, urgent care clinic since your last visit? Hospitalized since your last visit? No    2. Have you seen or consulted any other health care providers outside of the [address] [place] [address] since your last visit? Include any pap smears or colon screening.  No

## 2019-06-26 NOTE — PROGRESS NOTES
2 weeks status post gastric bypass. Pt reports doing well on liquids . Patient complains of left side pain  Pt reports no nausea and no vomiting  Sheis drinking approximately  oz of water daily  + BM  She is drinking 45-60  grams of protein daily. Uses Levsin for cramping  She is taking bariatric vitamins without issue. Total weight loss since surgery 21lbs  Weight loss since last visit 21lbs  Visit Vitals  /84   Pulse (!) 111   Temp 98.4 °F (36.9 °C)   Resp 20   Ht 5' 8\" (1.727 m)   Wt (!) 371 lb (168.3 kg)   SpO2 98%   BMI 56.41 kg/m²          Patient has an advanced directive: not on file    Ms. Arnav Mills has a reminder for a \"due or due soon\" health maintenance. I have asked that she contact her primary care provider for follow-up on this health maintenance. Physical Examination: General appearance - alert, well appearing, and in no distress,  Chest - clear to auscultation bilaterally  Heart - normal rate, regular rhythm, normal S1, S2, no murmurs, rubs, clicks or gallops  Abdomen - soft, nontender, nondistended  scars from previous incisions healing without erythema or induration    A/P    Doing well 2 weeks status post laparoscopic Gastric Bypass  Diet advanced   Left side pain stitch in muscle- start flexeril. Focus on 50-60 grams of protein daily. Encouraged water intake to 64 oz of non-carbonated/no calorie beverages daily. Supplement with unflavored protein powder daily. Continue PPI  No lifting greater than 20  lbs. Follow up in 2 weeks. Pt verbalized understanding and questions were answered to the best of my knowledge and ability.           Haresh Moran NP

## 2019-06-26 NOTE — PATIENT INSTRUCTIONS
Constipation: Care Instructions  Your Care Instructions    Constipation means that you have a hard time passing stools (bowel movements). People pass stools from 3 times a day to once every 3 days. What is normal for you may be different. Constipation may occur with pain in the rectum and cramping. The pain may get worse when you try to pass stools. Sometimes there are small amounts of bright red blood on toilet paper or the surface of stools. This is because of enlarged veins near the rectum (hemorrhoids). A few changes in your diet and lifestyle may help you avoid ongoing constipation. Your doctor may also prescribe medicine to help loosen your stool. Some medicines can cause constipation. These include pain medicines and antidepressants. Tell your doctor about all the medicines you take. Your doctor may want to make a medicine change to ease your symptoms. Follow-up care is a key part of your treatment and safety. Be sure to make and go to all appointments, and call your doctor if you are having problems. It's also a good idea to know your test results and keep a list of the medicines you take. How can you care for yourself at home? · Drink plenty of fluids, enough so that your urine is light yellow or clear like water. If you have kidney, heart, or liver disease and have to limit fluids, talk with your doctor before you increase the amount of fluids you drink. · Include high-fiber foods in your diet each day. These include fruits, vegetables, beans, and whole grains. · Get at least 30 minutes of exercise on most days of the week. Walking is a good choice. You also may want to do other activities, such as running, swimming, cycling, or playing tennis or team sports. · Take a fiber supplement, such as Citrucel or Metamucil, every day. Read and follow all instructions on the label. · Schedule time each day for a bowel movement. A daily routine may help.  Take your time having your bowel movement. · Support your feet with a small step stool when you sit on the toilet. This helps flex your hips and places your pelvis in a squatting position. · Your doctor may recommend an over-the-counter laxative to relieve your constipation. Examples are Milk of Magnesia and MiraLax. Read and follow all instructions on the label. Do not use laxatives on a long-term basis. When should you call for help? Call your doctor now or seek immediate medical care if:    · You have new or worse belly pain.     · You have new or worse nausea or vomiting.     · You have blood in your stools.    Watch closely for changes in your health, and be sure to contact your doctor if:    · Your constipation is getting worse.     · You do not get better as expected. Where can you learn more? Go to http://arlene-joann.info/. Enter 21 [id number] in the search box to learn more about \"Constipation: Care Instructions. \"  Current as of: September 23, 2018  Content Version: 11.9  © 2564-6187 [employer]. Care instructions adapted under license by [employer] (which disclaims liability or warranty for this information). If you have questions about a medical condition or this instruction, always ask your healthcare professional. [address] any warranty or liability for your use of this information. What you need to know:  1. Advance your diet to soft foods. Follow the handout that you were given today in the office. 2.  Take the recommended vitamins daily  3. No lifting greater than 20 lbs. 4.  You can do light jogging and walking. 5  Follow up in 2 weeks. 6.  You may go into a pool. 7.  If you are not able to tolerate liquids or soft foods. Please call our office. [phone]  8. If you have vomiting and persistent epigastric pain or chest pain. You should call our office, the doctor on-call or go to the emergency room. What to do if you are constipated:   You may  take Milk of Magnesia. Take 2 Tablespoons followed by 16 oz of water then 2 hours later take another 2 tablespoons. If  milk of magnesia does not work then take [place] or Miralax over the counter. Keep in mind that the Benefiber or Miralax may take a day or two to work. If all of the above do not work try a Fleets enema and follow the directions on the box. Soft and Mushy: Phase 1    Below is a list of basic items to purchase for the first phase of the   soft mushy diet. Your surgeon or nurse practitioner will inform you when it is okay   to advance to the next phase. Soft and Mushy Foods: Prepare food to the appropriate texture. ? Everything on clear and full liquid diet  ? Applesauce (no sugar added)  ? Hot & cold cereals (Cream of Wheat, Plain Cheerios®, Special K with protein®, plain oatmeal, grits)  ? Frozen or canned vegetables (carrots, acorn squash, butternut squash, string beans, spinach, broccoli, cauliflower - florets only!)   ? Canned fruit (in natural juice or with Splenda®)  ? Fat-free, cholesterol-free egg substitute (P)  ? Low-fat or fat-free cottage cheese (P)  ? Low-fat or fat-free yogurt (P)  ? Low-fat or fat-free [place] yogurt (P)  ? Fat-free milk or 1% milk (P)  ? Lactaid fat-free or 1% low fat milk (P)  ? Low-fat well-cooked/soft beans (the consistency of refried beans) (P)  ? No sugar added, low fat pudding (no pistachio or other flavor containing nuts)  ? low-fat cream soups  ? Low-fat chicken noodle or chicken rice soup (P)  ? Sugar-free fudgesicles  ? Sugar-free cocoa  ? Fat free whipped or mashed potatoes   ? Herbs and spices  ? Lite butter, margarine, canola oil, olive oil, reduced-fat or fat-free mayonnaise, reduced-fat or fat-free salad dressing, reduced-fat or fat-free cream cheese, reduced-fat or fat-free sour cream.        P designates food sources of protein.  Include a protein at each meal.     If a food does not contain protein, you may want to consider adding protein powder to the food to give it extra protein. For example, mix protein powder in with the following: oatmeal, mashed potatoes, sugar-free pudding, sugar-free gelatin (see recipes in book), no-sugar-added applesauce. Soft Mushy Diet: Phase 1  Time Meal or Snack Soft/Mushy Food Amount (ounces) Protein  (g) Supplement   6:30 am Sip on Fluids Sip on non-carbonated, calorie-free, no sugar added liquids. 8 oz   0 g Take Multivitamin containing 18 mg ferrous sulfate (iron)    7:00 am   Stop drinking fluids 30 minutes before breakfast   7:30 am Breakfast ½ cup sugar-free oatmeal with 1 scoop of protein powder. Add cinnamon, nutmeg, artificial sweeteners as desired for flavor. 4 oz    20-25 g    9:00 Snack  (optional) High Protein Gelatin (see recipe in book) 4oz 10 g    11:30 am Stop drinking liquids 30 minutes before lunch   12:00 pm Lunch Sip low-fat cream of potato soup or low-fat cream of chicken soup mixed with 1 scoop of protein powder 8 oz soup 25 g Take 400 mg calcium citrate   2:00 Snack  (optional) ½ cup high protein pudding (see recipe in book)   or   ½ cup low-fat cottage cheese or yogurt. Can also add protein powder as needed. 4 oz 14 g    or    5 g      3:00 - 5:30 pm   Sip on Fluids   Sip on non-carbonated, calorie-free, no sugar added liquids. 24 - 32 oz   0 g   Take 400 mg calcium citrate. 6:00 pm Dinner ¼ cup low-fat, well cooked beans (ex. black beans, low-fat refried beans)  ¼ cup no-sugar-added applesauce 4 oz 3.5 g Take 400 mg of calcium citrate.    7:00 - 10:00 pm Sip on Fluids Sip on non-carbonated, no sugar added liquids as needed  16-24 oz 0 g Take Multivitamin with 18 mg ferrous sulfate   Total:  80 oz clear fluids 63-77  grams 2 Multivitamins with 18 mg ferrous sulfate, 9323-0867 mg calcium citrate

## 2019-07-05 ENCOUNTER — TELEPHONE (OUTPATIENT)
Dept: SURGERY | Age: 39
End: 2019-07-05

## 2019-07-05 NOTE — TELEPHONE ENCOUNTER
----- Message from Hari Jeffrey LPN sent at 0/22/1254  8:59 AM EDT -----  Regarding: 3 week post op call      ----- Message -----  From: GARRETT Barrera  Sent: 6/14/2019   8:32 AM  To: Hari Jeffrey LPN  Subject: BC bypass DC home Fri 6/14                       Bariatric Discharge Notice for Follow Up Call    Admit Date: 6/12    Pt is POD #2 s/p laparoscopic gastric bypass surgery and is being discharged to home today Friday 6/14 after routine post op course, home on lovenox for 2 weeks     Thanks,  [employer]

## 2019-07-05 NOTE — TELEPHONE ENCOUNTER
Bariatric Post-Operative Phone Calls: Week 3    Diet:Question of any nausea and/or vomiting. Question of tolerance to diet advancement from liquids to solids. Protein intake (goal is 60 grams of protein daily)   Poor____Fair____Good__x__Great____     Comment:______________________________________________________________      ______________________________________________________________________    Hydration:Less than 32 ounces of water daily is fair to poor (Goal is 64 ounces per day)   Poor____ Fair____ Good____Great_x___    Comment:______________________________________________________________    ______________________________________________________________________      Ambulation:( walking at least 3 x week, for at least 30 minutes)   Poor______ Fair______ Good______     Great___x___ Comment:__________________________________________________    ______________________________________________________________________      Urine Color: Question of any odor and color(should be kati, pale, and clear) Dark______ Amber______ Pale___x___      Clear______ Comment:___________________________________________________                           ________________________________________________________________    Bowel movements: Question of any constipation- haven't had any bowel movements for more than 3 days. This could be related to protein intake and/or narcotic pain medication usage. Comment:                                                             Moving her bowels                                                                 Pain: Left sided abdominal pain is normal (should be less than 3)         Question if pain medication is helpful.  10___ 9___ 8___ 7___ 6___ 5___ 4___ 3___     2___1___0_x__Comment:_________________________________________________    ______________________________________________________________________      Incision: (No redness, pain, swelling or fever) Healing Well__x____ Healed______Redness_________ Pain_________     Swelling_________ Fever__________(greater than 101 needs evaluation)    Comment:____________________________________________________________    ______________________________________________________________________  Use of incentive spirometer: Yes____       No    x       Next Appointment:___7/8/19___________                 Support Group: Yes___x facebook___No______    Additional Comments:____________________________________________________________    ____________________________________________________________________      If more than one parameter is not met or considered poor, nurse needs to discuss with provider recommend for patient to be seen in the office as soon as possible or refer to the provider for follow-up. Reinforce to patient to use bariatric educational booklet as guide. It is appropriate to refer patient to the nutritionist to discuss more in detail of diet and nutrition.

## 2019-07-08 ENCOUNTER — OFFICE VISIT (OUTPATIENT)
Dept: SURGERY | Age: 39
End: 2019-07-08

## 2019-07-08 VITALS
TEMPERATURE: 97.9 F | HEART RATE: 100 BPM | DIASTOLIC BLOOD PRESSURE: 83 MMHG | BODY MASS INDEX: 44.41 KG/M2 | OXYGEN SATURATION: 94 % | RESPIRATION RATE: 20 BRPM | HEIGHT: 68 IN | SYSTOLIC BLOOD PRESSURE: 121 MMHG | WEIGHT: 293 LBS

## 2019-07-08 DIAGNOSIS — E66.01 MORBID OBESITY WITH BMI OF 40.0-44.9, ADULT (HCC): ICD-10-CM

## 2019-07-08 DIAGNOSIS — Z09 FOLLOW-UP EXAMINATION AFTER ABDOMINAL SURGERY: Primary | ICD-10-CM

## 2019-07-08 DIAGNOSIS — K91.2 POSTOPERATIVE INTESTINAL MALABSORPTION: ICD-10-CM

## 2019-07-08 RX ORDER — FAMOTIDINE 20 MG/1
20 TABLET, FILM COATED ORAL 2 TIMES DAILY
Qty: 60 TAB | Refills: 2 | Status: SHIPPED | OUTPATIENT
Start: 2019-07-08 | End: 2020-07-31

## 2019-07-08 NOTE — PATIENT INSTRUCTIONS
Increase pepcid to twice daily     What you need to know:  1 . Advance your diet to moist meats. Follow the handout that you were given today in the office. 2. Take the recommended vitamins daily  3 No lifting greater than 40 lbs. 4. You can do light jogging, moderate walking and a recumbent bike. 5 Follow up in 2 weeks. 6. You may go into a pool. 7. If you are not able to tolerate liquids, soft foods or moist meats. Please call our office. [phone]  8. If you have vomiting and persistent epigastric pain or chest pain. You should call our office, the doctor on-call or go to the emergency room. What to do if you are constipated: You may  take Milk of Magnesia. Take 2 Tablespoons followed by 16 oz of water then 2 hours later take another 2 tablespoons. If  milk of magnesia does not work then take [place] or Miralax over the counter. Keep in mind that the Benefiber or Miralax may take a day or two to work. If all of the above do not work try a Fleets enema and follow the directions on the box. Shopping List [employer]     Soft Mushy Diet: Phase 2 - Moist Meats     Below is a list of moist meats that you can now introduce into your diet. Moist Meats: Prepare food to the appropriate texture using low-fat cooking   methods       ? Tuna packed in water (strain before eating)  ? White flaky fish (ramakrishna, cod, flounder, tilapia, salmon)   ? White chicken breast packed in water (strain before eating)  ? 96-99% fat free thinly sliced deli meat (ham, turkey, roast beef)  ? Fat free non-stick spray  ? Silken Tofu  ? Low-fat or vegetarian refried beans  ? Well-cooked beans and lentils  ? Skinless turkey or chicken (prepare to a soft texture)  ? 93% lean pureed beef (round or sirloin only)  ? Lean pork (cooked until very tender, cut into small pieces)  ? Eggs (preferable egg whites)  ? Egg substitutes  ? Herbs and spices  ?  Lite butter, margarine, canola oil, olive oil, reduced-fat or fat-free marie, reduced-fat or fat-free salad dressing, reduced-fat or fat-free cream cheese, reduced-fat or fat-free sour cream, lemon juice, salt, pepper, mustard, ketchup, salsa. See patient handbook for more low-fat cooking ideas. ? Be sure to use moist methods of cooking. Avoid microwaving meats because it can dry out the food making it harder to tolerate. Soft Mushy Diet: Phase 2  Time Meal or Snack Soft/Mushy Food Amount (ounces) Protein  (g) Supplement   6:30 am Sip on Fluids Sip on non-carbonated, calorie-free, no sugar added liquids. 8 oz   0 g Take Multivitamin containing 18 mg ferrous sulfate (iron)    7:00 am   Stop drinking fluids 30 minutes before breakfast   7:30 am Breakfast ¼ cup soft scrambled egg or egg substitute   ¼ cup canned fruit (packed in natural juice, strained)  4 oz    7 g    9:00 Snack  (optional) ½ cup low-fat or fat-free yogurt   or   ½ cup cottage cheese  4oz 4g  or  14 g    11:30 am Stop drinking liquids 30 minutes before lunch   12:00 pm Lunch ¼ cup low-fat or lean deli meat  with  ¼ well cooked green beans 4 oz  14 g Take 400 mg calcium citrate   2:00 Snack  (optional) ½ cup high protein, sugar-free pudding with 1 scoop added protein powder (see recipe in book). 4 oz 14 g      3:00 - 5:30 pm   Sip on Fluids   Sip on non-carbonated, calorie-free, no sugar added liquids. 24 - 32 oz   0 g   Take 400 mg calcium citrate. 6:00 pm Dinner ¼ cup soft/flaky fish (tilapia, flounder, tuna)  ¼ cup mashed potatoes or pureed cauliflower mashed potatoes (consider adding protein powder)  4 oz 14 g Take 400 mg of calcium citrate.    7:00 - 10:00 pm Sip on Fluids Sip on non-carbonated, no sugar added liquids as needed  16-24 oz 0 g Take Multivitamin with 18 mg ferrous sulfate   Total:  64 oz fluids 63  grams 2 Multivitamins with 18 mg ferrous sulfate, 6849-1023 mg calcium citrate

## 2019-07-08 NOTE — PROGRESS NOTES
1. Have you been to the ER, urgent care clinic since your last visit? Hospitalized since your last visit? No    2. Have you seen or consulted any other health care providers outside of the [address] [place] [address] since your last visit? Include any pap smears or colon screening.  no

## 2019-07-29 ENCOUNTER — OFFICE VISIT (OUTPATIENT)
Dept: SURGERY | Age: 39
End: 2019-07-29

## 2019-07-29 VITALS
WEIGHT: 293 LBS | RESPIRATION RATE: 18 BRPM | HEIGHT: 68 IN | HEART RATE: 108 BPM | DIASTOLIC BLOOD PRESSURE: 70 MMHG | SYSTOLIC BLOOD PRESSURE: 128 MMHG | BODY MASS INDEX: 44.41 KG/M2 | OXYGEN SATURATION: 95 % | TEMPERATURE: 98.5 F

## 2019-07-29 DIAGNOSIS — Z09 SURGICAL FOLLOWUP: ICD-10-CM

## 2019-07-29 DIAGNOSIS — E66.01 MORBID OBESITY (HCC): Primary | ICD-10-CM

## 2019-07-29 NOTE — PROGRESS NOTES
6 weeks status post gastric bypass. Pt reports doing well on liquids, soft foods and soft meats . Patient  No complaints of pain  Pt reports no nausea and no vomiting  Sheis drinking approximately 64 oz of water daily  + BM  She is drinking and eating 50-60  grams of protein daily. She is taking bariatric vitamins without issue. Total weight loss since surgery 41lbs  Weight loss since last visit 11lbs  Visit Vitals  /70 (BP 1 Location: Left arm, BP Patient Position: Sitting)   Pulse (!) 108   Temp 98.5 °F (36.9 °C) (Oral)   Resp 18   Ht 5' 8\" (1.727 m)   Wt (!) 351 lb (159.2 kg)   LMP 07/04/2019 (Approximate)   SpO2 95%   BMI 53.37 kg/m²          Patient has an advanced directive: NO    Ms. Pau Swain has a reminder for a \"due or due soon\" health maintenance. I have asked that she contact her primary care provider for follow-up on this health maintenance. Physical Examination: General appearance - alert, well appearing, and in no distress,  Chest - clear to auscultation bilaterally  Heart - normal rate, regular rhythm, normal S1, S2, no murmurs, rubs, clicks or gallops  Abdomen - soft, nontender, nondistended  scars from previous incisions healing without erythema or induration    A/P    Doing well 6 weeks status post laparoscopic Gastric Bypass  Diet advanced to solid foods. Focus on 50-60 grams of protein daily. Encouraged water intake to 64 oz of non-carbonated/no calorie beverages daily. Supplement with unflavored protein powder daily. May exercise  Continue vitamins. Follow up in 6 weeks. Pt verbalized understanding and questions were answered to the best of my knowledge and ability.         Luisa Enriquez NP

## 2019-07-29 NOTE — PROGRESS NOTES
1. Have you been to the ER, urgent care clinic since your last visit? Hospitalized since your last visit? No    2. Have you seen or consulted any other health care providers outside of the [address] [place] [address] since your last visit? Include any pap smears or colon screening.  No

## 2019-09-09 ENCOUNTER — OFFICE VISIT (OUTPATIENT)
Dept: SURGERY | Age: 39
End: 2019-09-09

## 2019-09-09 VITALS
WEIGHT: 293 LBS | HEIGHT: 68 IN | BODY MASS INDEX: 44.41 KG/M2 | RESPIRATION RATE: 19 BRPM | SYSTOLIC BLOOD PRESSURE: 119 MMHG | TEMPERATURE: 98.2 F | OXYGEN SATURATION: 96 % | DIASTOLIC BLOOD PRESSURE: 91 MMHG | HEART RATE: 77 BPM

## 2019-09-09 DIAGNOSIS — E66.01 MORBID OBESITY (HCC): Primary | ICD-10-CM

## 2019-09-09 DIAGNOSIS — Z09 SURGICAL FOLLOWUP: ICD-10-CM

## 2019-09-09 DIAGNOSIS — B37.0 ORAL THRUSH: ICD-10-CM

## 2019-09-09 RX ORDER — NYSTATIN 100000 [USP'U]/ML
1 SUSPENSION ORAL 4 TIMES DAILY
Qty: 140 ML | Refills: 0 | Status: SHIPPED | OUTPATIENT
Start: 2019-09-09 | End: 2019-09-16

## 2019-09-09 NOTE — PATIENT INSTRUCTIONS

## 2019-09-09 NOTE — PROGRESS NOTES
1. Have you been to the ER, urgent care clinic since your last visit? Hospitalized since your last visit? No    2. Have you seen or consulted any other health care providers outside of the [address] [place] [address] since your last visit? Include any pap smears or colon screening. No      complains of  Patient believes she has thrush. States has had some irritation in her mouth. She was sick last week.

## 2019-09-09 NOTE — PROGRESS NOTES
HISTORY OF PRESENT ILLNESS  Elizabeth Cook is a 34 y.o. female with previous Malabsorptive Gastric bypass surgery 3 months ago. She has lost a total of 70  pounds since surgery. She  Has lost 29 lbs since the last ov. Body mass index is 49.2 kg/m². Judithe [place] no nausea and no vomiting . + Acid reflux/heartburn, taking famotidine. . Drinking 64 ounces of water daily. 45-60 grams protein intake daily. + BM's. Pt is walking for exercise. Dietary recall -    Breakfast- devil eggs. Sil Ask, yogurt or fruit, oatmeal  Lunch- tuna/tomato,chicken salad  Dinner- hamburger steak, taco, chili       Vitamins:  MVI : yes  Calcium : yes  B-Vit 12: yes    Patient has an advanced directive: NOt on file. Ms. Isabel Bravo has a reminder for a \"due or due soon\" health maintenance. I have asked that she contact her primary care provider for follow-up on this health maintenance. Co-Morbid(s)           COMORBIDITY     SLEEP APNEA                 NO        GERD  (req.meds)           yes  HYPERLIPIDEMIA            NO  HYPERTENSION              NO         DIABETES                         NO           Current Outpatient Medications:     famotidine (PEPCID) 20 mg tablet, Take 1 Tab by mouth two (2) times a day., Disp: 60 Tab, Rfl: 2    multivitamin with iron tablet, Take 1 Tab by mouth daily. , Disp: , Rfl:     cyanocobalamin (VITAMIN B-12) 500 mcg tablet, Take 500 mcg by mouth daily. , Disp: , Rfl:     calcium citrate/vitamin D3 (CALCIUM CITRATE + D PO), Take  by mouth., Disp: , Rfl:     hyoscyamine SL (LEVSIN/SL) 0.125 mg SL tablet, 1 Tab by SubLINGual route every four (4) hours as needed for Cramping., Disp: 30 Tab, Rfl: 0    cyclobenzaprine (FLEXERIL) 10 mg tablet, Take 1 Tab by mouth three (3) times daily as needed for Muscle Spasm(s). , Disp: 20 Tab, Rfl: 0    ergocalciferol (ERGOCALCIFEROL) 50,000 unit capsule, TAKE ONE CAPSULE BY MOUTH ONE TIME PER WEEK, Disp: 4 Cap, Rfl: 2    levothyroxine (SYNTHROID) 175 mcg tablet, Take 175 mcg by mouth Daily (before breakfast). , Disp: , Rfl:     fluticasone furoate-vilanterol (BREO ELLIPTA) 100-25 mcg/dose inhaler, Take 1 Puff by inhalation daily. Indications: AT 3PM, Disp: , Rfl:     albuterol (PROVENTIL VENTOLIN) 2.5 mg /3 mL (0.083 %) nebulizer solution, 3 mL by Nebulization route every four (4) hours as needed for Wheezing., Disp: 24 Each, Rfl: 0    doxycycline (VIBRA-TABS) 100 mg tablet, Take 100 mg by mouth as needed (ACNE). , Disp: , Rfl:     ondansetron (ZOFRAN ODT) 4 mg disintegrating tablet, Take 1 Tab by mouth every eight (8) hours as needed for Nausea., Disp: 30 Tab, Rfl: 0    etonogestrel (NEXPLANON) 68 mg impl, by SubDERmal route. BIRTH CONTROL - 5/23/18 INSERTION DATE, Disp: , Rfl:     PROAIR HFA 90 mcg/actuation inhaler, Take 2 Puffs by inhalation every four (4) hours as needed for Wheezing or Shortness of Breath. Every 4 to 6 hours as needed, Disp: , Rfl: 5    acetaminophen (TYLENOL EXTRA STRENGTH) 500 mg tablet, Take 1,500 mg by mouth every six (6) hours as needed for Pain., Disp: , Rfl:       Visit Vitals  BP (!) 119/91 (BP 1 Location: Left arm, BP Patient Position: Sitting)   Pulse 77   Temp 98.2 °F (36.8 °C) (Oral)   Resp 19   Ht 5' 8\" (1.727 m)   Wt 323 lb 9.6 oz (146.8 kg)   SpO2 96%   BMI 49.20 kg/m²     HPI    Review of Systems   HENT:        States she has thrush in her mouth. Respiratory:        Getting over a cold. Gastrointestinal: Negative for abdominal pain, heartburn, nausea and vomiting. Neurological: Negative for headaches. Physical Exam   Constitutional: She is oriented to person, place, and time. She appears well-developed and well-nourished. HENT:   Yudelka Mcmanus noted on tongue and tonsils. Neck: Normal range of motion. Neck supple. Cardiovascular: Normal rate and regular rhythm. Exam reveals no gallop and no friction rub. No murmur heard. Pulmonary/Chest: Effort normal and breath sounds normal.   Abdominal: Soft.  Bowel sounds are normal. She exhibits no distension. There is no tenderness. No masses or hernias noted. Neurological: She is alert and oriented to person, place, and time. Skin: Skin is warm and dry. Psychiatric: She has a normal mood and affect. ASSESSMENT and PLAN  Liliana Payne is a 34 y.o. female with previous Malabsorptive Gastric bypass surgery 3 months ago. She has lost a total of 70  pounds since surgery. She  Has lost 29 lbs since the last ov. Body mass index is 49.2 kg/m². Ralene Nader Nystatin swish and spit prescribed. If thursh does not resovle, f/u with PCP  Advised patient regard to diet that is high-protein, low-fat, low-sugar, limited carbohydrates. Strive for 50-60 grams of protein daily. If having a snack, foods that are protein or fiber rich. . No eating/drinking together, chew foods well, and portion control. Measure meals. Discussed snacking behavior and to [facility] pay attention to behavioral factor and habits. Drink at least 40-64 ounces of water or non-calorie/non-carbonated beverages daily. Continue vitamin regiment daily. Exercise at least 3 days a week with cardiovascular and strength training. Patient to follow up in 3 months. . Advised to call office if any questions/concerns. Pt verbalized understanding and questions were answered to the best of my knowledge and ability.

## 2019-09-12 RX ORDER — ERGOCALCIFEROL 1.25 MG/1
CAPSULE ORAL
Qty: 4 CAP | Refills: 2 | Status: SHIPPED | OUTPATIENT
Start: 2019-09-12 | End: 2019-10-04 | Stop reason: SDUPTHER

## 2019-10-04 RX ORDER — ERGOCALCIFEROL 1.25 MG/1
CAPSULE ORAL
Qty: 4 CAP | Refills: 2 | Status: SHIPPED | OUTPATIENT
Start: 2019-10-04 | End: 2020-07-31

## 2019-10-21 ENCOUNTER — TELEPHONE (OUTPATIENT)
Dept: SURGERY | Age: 39
End: 2019-10-21

## 2019-10-21 NOTE — TELEPHONE ENCOUNTER
Left message on voice mail @ [phone] (H) to call back & informed that nurse will call tomorrow.

## 2019-10-21 NOTE — LETTER
10/22/2019 10:43 AM 
 
[phone] To whom it may concern: 
 
 
 
Please excuse Corwin Ulrich from work today due to illness  If you have any questions please have the 
 
 patient call our office. Sincerely, 
 
 
JAIT Frausto/long

## 2019-10-21 NOTE — TELEPHONE ENCOUNTER
Left work early. Nausea and has chills. Surgery was in June. Ever since surgery always feels cold.   [phone]

## 2019-10-22 NOTE — TELEPHONE ENCOUNTER
I called the patient and she said yesterday while at work she was feeling weak, nauseated and was not feeling well and last night she said she vomited. She said went to her PCP 2 weeks ago and he roel her Iron level because she said she is always cold. She was start on Cipro for UTI and Prozac was started for 1 week. She said she had a normal BM today and she said she does not feel like it was a virus or a cold. She said she has been eating a lot of fruit. I asked her if she has been getting in her Protein as this could be hypoglycemia. She said she feels better today. I suggested she keep a journal for 3 day and look at the amount of sugars in her foods and the natural sugar in the fruit she said she has been eating,I told her we can work her into the schedule if she is not feeling better or if this happens again. She also asked for a work note for today which we have provided for her via e-mail. Pt in agreement.

## 2020-04-13 ENCOUNTER — TELEPHONE (OUTPATIENT)
Dept: SURGERY | Age: 40
End: 2020-04-13

## 2020-07-17 ENCOUNTER — HOSPITAL ENCOUNTER (OUTPATIENT)
Dept: GENERAL RADIOLOGY | Age: 40
Discharge: HOME | End: 2020-07-17
Attending: PODIATRIST
Payer: MEDICAID

## 2020-07-17 DIAGNOSIS — M21.621 TAILOR'S BUNIONETTE, RIGHT: ICD-10-CM

## 2020-07-17 PROCEDURE — 73630 X-RAY EXAM OF FOOT: CPT

## 2020-07-28 NOTE — H&P
Lorena Klein DPM - Maurisio Duggan DPM - Frederica Mortimer, DPM                                     Podiatric Surgery Pre-Operative History & Physical  Subjective:  Patient presents today complaining of pain to b/l Tailor's bunions. Has tried using ice, NSAIDs, wider shoes, and activity modification with decreasing effect. Notes insidious onset, pain ranges from dull/achey to sharp. Aggravated by ambulation/activity, relieved by rest, getting worse. Pain is mostly centered at the lateral eminence of the bump and not as much plantar. Pt notes she does smoke about 0.5 ppd, has narcotic Rx for painful periods. Has cut down now to about 3 cigarettes per day.       History:  RIGHT TAILORS BUNION  Allergies   Allergen Reactions    Percocet [Oxycodone-Acetaminophen] Nausea and Vomiting     Family History   Problem Relation Age of Onset   Phong.Kzeia Arthritis-rheumatoid Mother     COPD Mother     Diabetes Father     Stroke Father     Hypertension Father     Cancer Maternal Uncle     Diabetes Paternal Grandmother     No Known Problems Brother     Breast Cancer Neg Hx     Ovarian Cancer Neg Hx     Uterine Cancer Neg Hx     Anesth Problems Neg Hx       Past Medical History:   Diagnosis Date    Adverse effect of anesthesia     WOKE UP DURING ENDOSCOPY WITH KETAMINE AND PATIENT REPORTS FEELING LIKE SHE WAS HALLUCINATING - BUT DID OK WITH PROPOFOL IN PAST WITH NO ISSUES PER PATIENT    Arthritis     BILATERAL KNEES- OA    Asthma     Chronic pain     KNEES    GERD (gastroesophageal reflux disease)     Hiatal hernia     Hypothyroid     Morbid obesity ([address]     PUD (peptic ulcer disease)     PRE ULCER -    Second hand smoke exposure      Past Surgical History:   Procedure Laterality Date    HX  SECTION      HX ENDOSCOPY      HX GI  2016    ENDOSCOPY    HX LAP GASTRIC BYPASS  2019 Laparoscopic gastric bypass by Dr. Josue Rowan. Social History     Tobacco Use    Smoking status: Former Smoker     Packs/day: 1.00     Years: 15.00     Pack years: 15.00     Last attempt to quit: 2014     Years since quittin.7    Smokeless tobacco: Never Used   Substance Use Topics    Alcohol use: Yes     Comment: OCCASIONALLY       Social History     Substance and Sexual Activity   Alcohol Use Yes    Comment: OCCASIONALLY     Social History     Substance and Sexual Activity   Drug Use No      Social History     Tobacco Use   Smoking Status Former Smoker    Packs/day: 1.00    Years: 15.00    Pack years: 15.00    Last attempt to quit: 2014    Years since quittin.7   Smokeless Tobacco Never Used     No current facility-administered medications for this encounter. Current Outpatient Medications   Medication Sig    ergocalciferol (ERGOCALCIFEROL) 50,000 unit capsule TAKE ONE CAPSULE BY MOUTH ONE TIME PER WEEK    famotidine (PEPCID) 20 mg tablet Take 1 Tab by mouth two (2) times a day.  multivitamin with iron tablet Take 1 Tab by mouth daily.  cyanocobalamin (VITAMIN B-12) 500 mcg tablet Take 500 mcg by mouth daily.  calcium citrate/vitamin D3 (CALCIUM CITRATE + D PO) Take  by mouth.  hyoscyamine SL (LEVSIN/SL) 0.125 mg SL tablet 1 Tab by SubLINGual route every four (4) hours as needed for Cramping.  cyclobenzaprine (FLEXERIL) 10 mg tablet Take 1 Tab by mouth three (3) times daily as needed for Muscle Spasm(s).  levothyroxine (SYNTHROID) 175 mcg tablet Take 175 mcg by mouth Daily (before breakfast).  fluticasone furoate-vilanterol (BREO ELLIPTA) 100-25 mcg/dose inhaler Take 1 Puff by inhalation daily. Indications: AT 3PM    albuterol (PROVENTIL VENTOLIN) 2.5 mg /3 mL (0.083 %) nebulizer solution 3 mL by Nebulization route every four (4) hours as needed for Wheezing.  doxycycline (VIBRA-TABS) 100 mg tablet Take 100 mg by mouth as needed (ACNE).     ondansetron (ZOFRAN ODT) 4 mg disintegrating tablet Take 1 Tab by mouth every eight (8) hours as needed for Nausea.  acetaminophen (TYLENOL EXTRA STRENGTH) 500 mg tablet Take 1,500 mg by mouth every six (6) hours as needed for Pain.  etonogestrel (NEXPLANON) 68 mg impl by SubDERmal route. BIRTH CONTROL - 5/23/18 INSERTION DATE    PROAIR HFA 90 mcg/actuation inhaler Take 2 Puffs by inhalation every four (4) hours as needed for Wheezing or Shortness of Breath. Every 4 to 6 hours as needed      Objective  Orthopaedic Examination:  There is tenderness to palpation and mild edema at b/l lateral eminences of b/l. No TTP at plantar calluses. No other pain on palpation or on active/passive ROM to the ankle/STJ/MTPJs/IPJs, or along the Achilles, peroneal, tibial tendons/insertions. No Michaela's sign. Anterior drawer test reveals no anomalies/pain/instability. Vascular Examination:  B/L LE  DP 2/4  PT  2/4    Capillary fill time is brisk, pedal hair is present, feet are warm and dry, no calf tenderness to palpation. Varicosities are absent. Edema is absent. Dermatological Examination:  Nails are normal in appearance. No ulcerations. No scars. Keratotic lesions developing SM 5 b/l. Skin otherwise within normal limits for temperature turgor, and appearance. Neurological Examination:  Patient is AAOx3. Epicritic sensation is intact to B/L LE. Protective sensation is intact to B/L LE per 5.[address] [employer] Trisha monofilament. S1 deep tendon reflex and Babinski reflex are inact. No Tinel's/Vailleux's signs. WB XR revealed mild increased 5th KAREN, lateral eminence at 5th metatarsal head. Assessment  Tailor's bunion b/l feet    Plan Print visit summary  - Patient evaluated and treated. - Etiology and progression of patient's condition discussed. All questions/concerns addressed. - Surgical options were discussed with the patient, such as reverse Silver procedure.   It was noted this procedure can provide many benefits, but have risks associated with them, notably infection, flail or floppy toe, numbness, persisting pain, swellling, recurrence of deformity/pain, nerve injury, among others.  - Reviewed surgical consent, pt interested in proceeding.

## 2020-07-31 ENCOUNTER — HOSPITAL ENCOUNTER (OUTPATIENT)
Dept: PREADMISSION TESTING | Age: 40
Discharge: HOME | End: 2020-07-31
Payer: MEDICAID

## 2020-07-31 VITALS
RESPIRATION RATE: 17 BRPM | DIASTOLIC BLOOD PRESSURE: 73 MMHG | HEIGHT: 68 IN | WEIGHT: 190.92 LBS | OXYGEN SATURATION: 100 % | TEMPERATURE: 98.2 F | SYSTOLIC BLOOD PRESSURE: 109 MMHG | HEART RATE: 63 BPM | BODY MASS INDEX: 28.94 KG/M2

## 2020-07-31 LAB
ANION GAP SERPL CALC-SCNC: 4 MMOL/L (ref 5–15)
BASOPHILS # BLD: 0.1 K/UL (ref 0–0.1)
BASOPHILS NFR BLD: 1 % (ref 0–1)
BUN SERPL-MCNC: 13 MG/DL (ref 6–20)
BUN/CREAT SERPL: 23 (ref 12–20)
CALCIUM SERPL-MCNC: 8.3 MG/DL (ref 8.5–10.1)
CHLORIDE SERPL-SCNC: 110 MMOL/L (ref 97–108)
CO2 SERPL-SCNC: 26 MMOL/L (ref 21–32)
CREAT SERPL-MCNC: 0.56 MG/DL (ref 0.55–1.02)
DIFFERENTIAL METHOD BLD: NORMAL
EOSINOPHIL # BLD: 0.1 K/UL (ref 0–0.4)
EOSINOPHIL NFR BLD: 2 % (ref 0–7)
ERYTHROCYTE [DISTWIDTH] IN BLOOD BY AUTOMATED COUNT: 13.2 % (ref 11.5–14.5)
GLUCOSE SERPL-MCNC: 68 MG/DL (ref 65–100)
HCG UR QL: NEGATIVE
HCT VFR BLD AUTO: 40.2 % (ref 35–47)
HGB BLD-MCNC: 13.3 G/DL (ref 11.5–16)
IMM GRANULOCYTES # BLD AUTO: 0 K/UL (ref 0–0.04)
IMM GRANULOCYTES NFR BLD AUTO: 0 % (ref 0–0.5)
INR PPP: 1 (ref 0.9–1.1)
LYMPHOCYTES # BLD: 2 K/UL (ref 0.8–3.5)
LYMPHOCYTES NFR BLD: 26 % (ref 12–49)
MCH RBC QN AUTO: 32 PG (ref 26–34)
MCHC RBC AUTO-ENTMCNC: 33.1 G/DL (ref 30–36.5)
MCV RBC AUTO: 96.9 FL (ref 80–99)
MONOCYTES # BLD: 0.5 K/UL (ref 0–1)
MONOCYTES NFR BLD: 6 % (ref 5–13)
NEUTS SEG # BLD: 5.1 K/UL (ref 1.8–8)
NEUTS SEG NFR BLD: 65 % (ref 32–75)
NRBC # BLD: 0 K/UL (ref 0–0.01)
NRBC BLD-RTO: 0 PER 100 WBC
PLATELET # BLD AUTO: 376 K/UL (ref 150–400)
PMV BLD AUTO: 9.6 FL (ref 8.9–12.9)
POTASSIUM SERPL-SCNC: 3.6 MMOL/L (ref 3.5–5.1)
PROTHROMBIN TIME: 10.4 SEC (ref 9–11.1)
RBC # BLD AUTO: 4.15 M/UL (ref 3.8–5.2)
SODIUM SERPL-SCNC: 140 MMOL/L (ref 136–145)
WBC # BLD AUTO: 7.8 K/UL (ref 3.6–11)

## 2020-07-31 PROCEDURE — 93005 ELECTROCARDIOGRAM TRACING: CPT

## 2020-07-31 PROCEDURE — 85610 PROTHROMBIN TIME: CPT

## 2020-07-31 PROCEDURE — 81025 URINE PREGNANCY TEST: CPT

## 2020-07-31 PROCEDURE — 80048 BASIC METABOLIC PNL TOTAL CA: CPT

## 2020-07-31 PROCEDURE — 85025 COMPLETE CBC W/AUTO DIFF WBC: CPT

## 2020-07-31 PROCEDURE — 36415 COLL VENOUS BLD VENIPUNCTURE: CPT

## 2020-07-31 RX ORDER — HYDROCODONE BITARTRATE AND ACETAMINOPHEN 10; 325 MG/1; MG/1
1 TABLET ORAL
COMMUNITY

## 2020-07-31 RX ORDER — FLUOXETINE HYDROCHLORIDE 40 MG/1
40 CAPSULE ORAL
COMMUNITY

## 2020-07-31 RX ORDER — LEVOTHYROXINE SODIUM 200 UG/1
200 TABLET ORAL
COMMUNITY

## 2020-07-31 RX ORDER — DIPHENHYDRAMINE HCL 25 MG
50 CAPSULE ORAL
COMMUNITY

## 2020-07-31 RX ORDER — FAMOTIDINE 20 MG/1
40 TABLET, FILM COATED ORAL
COMMUNITY

## 2020-07-31 NOTE — PERIOP NOTES
It is now mandated that all surgical patients be tested for COVID-19 prior to surgery. Testing has to be exactly 4 days prior to surgery. Your COVID test date is 8/3/2020 between 8:00 am and 11:00 am.       COVID testing will be performed curbside at the [address] [place] [address] Dr holly. There will be signs leading you to the testing site. You will need to bring a photo ID with you to be swabbed. Patients are advised to self-quarantine at home after testing and prior to your surgery date. You will be notified if your results are positive.     What to watch for:   Coronavirus (COVID-19) affects different people in different ways   It also appears with a wide range of symptoms from mild to severe   Signs usually appear 2-14 days after exposure     If you develop any of the following, notify your doctor immediately:  o Fever  o Chills, with or without a shiver  o Muscle pain  o Headache  o Sore throat  o Dry cough  o New loss of taste or smell  o Tiredness      If you develop any of the following, call 911:  o Shortness of breath  o Difficulty breathing  o Chest pain  o New confusion  o Blueness of fingers and/or lips

## 2020-07-31 NOTE — PERIOP NOTES
Spoke with Dr Darshana Nuñez to clarify lab orders      Original orders:   POC urine HCG and POC PT/INR  No phase of care for either order was ordered     Per Dr Darshana Nuñez  Please do regular PT/INR and urine for HCG with PAT labs

## 2020-07-31 NOTE — PERIOP NOTES
Em Willoughby                   [facility], [address] [facility]   MAIN OR                                  [phone]   MAIN PRE OP                          [phone]                                                                                AMBULATORY PRE OP          [phone]  PRE-ADMISSION TESTING    [phone]     Surgery Date:   8/7/2020       Is surgery arrival time given by surgeon? YES  NO  If NO, [phone] staff will call you between 3 and 7pm the day before your surgery with your arrival time. (If your surgery is on a Monday, we will call you the Friday before.)    Call [phone] after 7pm Monday-Friday if you did not receive this call.     INSTRUCTIONS BEFORE YOUR SURGERY   When You  Arrive Arrive at the [address] [facility] on the day of your surgery  Have your insurance card, photo ID, and any copayment (if needed)   Food   and   Drink NO food or drink after midnight the night before surgery    This means NO water, gum, mints, coffee, juice, etc.  No alcohol (beer, wine, liquor) 24 hours before and after surgery   Medications to   TAKE   Morning of Surgery MEDICATIONS TO TAKE THE MORNING OF SURGERY WITH A SIP OF WATER:    Famotidine, levothyroxine, fluoxetine   Bring your rescue inhaler to hospital with you   Pain pill if needed    If you feel you need a jet neb treatment please do one the morning of surgery before leaving for the hospital       Medications  To  STOP      7 days before surgery  Non-Steroidal anti-inflammatory Drugs (NSAID's): for example, Ibuprofen (Advil, Motrin), Naproxen (Aleve)   Aspirin, if taking for pain    Herbal supplements, vitamins, and fish oil   Other:  (Pain medications not listed above, including Tylenol may be taken)   Blood  Thinners  If you take  Aspirin, Plavix, Coumadin, or any blood-thinning or anti-blood clot medicine, talk to the doctor who prescribed the medications for pre-operative instructions. Bathing Clothing  Jewelry  Valuables      If you shower the morning of surgery, please do not apply anything to your skin (lotions, powders, deodorant, or makeup, especially mascara)   Follow Chlorhexidine Care [employer] body wash instructions provided to you during PAT appointment. Begin 3 days prior to surgery.  Do not shave or trim anywhere 24 hours before surgery   Wear your hair loose or down; no pony-tails, buns, or metal hair clips   Wear loose, comfortable, clean clothes   Wear glasses instead of contacts   Leave money, valuables, and jewelry, including body piercings, at home   Going Home - or Spending the Night  SAME-DAY SURGERY: You must have a responsible adult drive you home and stay with you 24 hours after surgery   ADMITS: If your doctor is keeping you in the hospital after surgery, leave personal belongings/luggage in your car until you have a hospital room number. Hospital discharge time is 12 noon  Drivers must be here before 12 noon unless you are told differently   Special Instructions NONE     Follow all instructions so your surgery wont be cancelled. Please, be on time. If a situation occurs and you are delayed the day of surgery, call [phone]    If your physical condition changes (like a fever, cold, flu, etc.) call your surgeon. Home medication(s) reviewed and verified via    [address]   during PAT appointment. The patient was contacted by   IN-PERSON  The patient verbalizes understanding of all instructions and     DOES NOT   need reinforcement.

## 2020-08-01 LAB
ATRIAL RATE: 57 BPM
CALCULATED P AXIS, ECG09: 51 DEGREES
CALCULATED R AXIS, ECG10: 29 DEGREES
CALCULATED T AXIS, ECG11: 47 DEGREES
DIAGNOSIS, 93000: NORMAL
P-R INTERVAL, ECG05: 130 MS
Q-T INTERVAL, ECG07: 452 MS
QRS DURATION, ECG06: 86 MS
QTC CALCULATION (BEZET), ECG08: 439 MS
VENTRICULAR RATE, ECG03: 57 BPM

## 2020-08-03 ENCOUNTER — HOSPITAL ENCOUNTER (OUTPATIENT)
Dept: PREADMISSION TESTING | Age: 40
Discharge: HOME | End: 2020-08-03
Payer: MEDICAID

## 2020-08-03 PROCEDURE — 87635 SARS-COV-2 COVID-19 AMP PRB: CPT

## 2020-08-04 LAB — SARS-COV-2, COV2NT: NOT DETECTED

## 2020-08-06 ENCOUNTER — ANESTHESIA EVENT (OUTPATIENT)
Dept: SURGERY | Age: 40
End: 2020-08-06
Payer: MEDICAID

## 2020-08-07 ENCOUNTER — ANESTHESIA (OUTPATIENT)
Dept: SURGERY | Age: 40
End: 2020-08-07
Payer: MEDICAID

## 2020-08-07 ENCOUNTER — APPOINTMENT (OUTPATIENT)
Dept: GENERAL RADIOLOGY | Age: 40
End: 2020-08-07
Attending: PODIATRIST
Payer: MEDICAID

## 2020-08-07 ENCOUNTER — HOSPITAL ENCOUNTER (OUTPATIENT)
Age: 40
Setting detail: OUTPATIENT SURGERY
Discharge: HOME | End: 2020-08-07
Attending: PODIATRIST | Admitting: PODIATRIST
Payer: MEDICAID

## 2020-08-07 VITALS
DIASTOLIC BLOOD PRESSURE: 61 MMHG | RESPIRATION RATE: 12 BRPM | OXYGEN SATURATION: 96 % | TEMPERATURE: 97.6 F | HEART RATE: 57 BPM | SYSTOLIC BLOOD PRESSURE: 108 MMHG

## 2020-08-07 LAB — HCG UR QL: NEGATIVE

## 2020-08-07 PROCEDURE — 76210000021 HC REC RM PH II 0.5 TO 1 HR: Performed by: PODIATRIST

## 2020-08-07 PROCEDURE — 74011250636 HC RX REV CODE- 250/636: Performed by: ANESTHESIOLOGY

## 2020-08-07 PROCEDURE — 76060000032 HC ANESTHESIA 0.5 TO 1 HR: Performed by: PODIATRIST

## 2020-08-07 PROCEDURE — 77030040922 HC BLNKT HYPOTHRM STRY -A

## 2020-08-07 PROCEDURE — 77030018836 HC SOL IRR NACL ICUM -A: Performed by: PODIATRIST

## 2020-08-07 PROCEDURE — 74011000250 HC RX REV CODE- 250: Performed by: PODIATRIST

## 2020-08-07 PROCEDURE — 77030000032 HC CUF TRNQT ZIMM -B: Performed by: PODIATRIST

## 2020-08-07 PROCEDURE — 76010000138 HC OR TIME 0.5 TO 1 HR: Performed by: PODIATRIST

## 2020-08-07 PROCEDURE — 76210000006 HC OR PH I REC 0.5 TO 1 HR: Performed by: PODIATRIST

## 2020-08-07 PROCEDURE — 81025 URINE PREGNANCY TEST: CPT

## 2020-08-07 PROCEDURE — 77030006773 HC BLD SAW OSC BRSM -A: Performed by: PODIATRIST

## 2020-08-07 PROCEDURE — 77030040361 HC SLV COMPR DVT MDII -B

## 2020-08-07 PROCEDURE — 73620 X-RAY EXAM OF FOOT: CPT

## 2020-08-07 PROCEDURE — 74011250636 HC RX REV CODE- 250/636: Performed by: PODIATRIST

## 2020-08-07 PROCEDURE — 74011250636 HC RX REV CODE- 250/636: Performed by: NURSE ANESTHETIST, CERTIFIED REGISTERED

## 2020-08-07 RX ORDER — SODIUM CHLORIDE, SODIUM LACTATE, POTASSIUM CHLORIDE, CALCIUM CHLORIDE 600; 310; 30; 20 MG/100ML; MG/100ML; MG/100ML; MG/100ML
125 INJECTION, SOLUTION INTRAVENOUS CONTINUOUS
Status: DISCONTINUED | OUTPATIENT
Start: 2020-08-07 | End: 2020-08-07 | Stop reason: HOSPADM

## 2020-08-07 RX ORDER — HYDROMORPHONE HYDROCHLORIDE 1 MG/ML
.25-1 INJECTION, SOLUTION INTRAMUSCULAR; INTRAVENOUS; SUBCUTANEOUS
Status: DISCONTINUED | OUTPATIENT
Start: 2020-08-07 | End: 2020-08-07 | Stop reason: HOSPADM

## 2020-08-07 RX ORDER — NALOXONE HYDROCHLORIDE 0.4 MG/ML
0.2 INJECTION, SOLUTION INTRAMUSCULAR; INTRAVENOUS; SUBCUTANEOUS
Status: DISCONTINUED | OUTPATIENT
Start: 2020-08-07 | End: 2020-08-07 | Stop reason: HOSPADM

## 2020-08-07 RX ORDER — DIPHENHYDRAMINE HYDROCHLORIDE 50 MG/ML
12.5 INJECTION, SOLUTION INTRAMUSCULAR; INTRAVENOUS AS NEEDED
Status: DISCONTINUED | OUTPATIENT
Start: 2020-08-07 | End: 2020-08-07 | Stop reason: HOSPADM

## 2020-08-07 RX ORDER — FENTANYL CITRATE 50 UG/ML
INJECTION, SOLUTION INTRAMUSCULAR; INTRAVENOUS AS NEEDED
Status: DISCONTINUED | OUTPATIENT
Start: 2020-08-07 | End: 2020-08-07 | Stop reason: HOSPADM

## 2020-08-07 RX ORDER — SODIUM CHLORIDE 0.9 % (FLUSH) 0.9 %
5-40 SYRINGE (ML) INJECTION AS NEEDED
Status: CANCELLED | OUTPATIENT
Start: 2020-08-07

## 2020-08-07 RX ORDER — MIDAZOLAM HYDROCHLORIDE 1 MG/ML
INJECTION, SOLUTION INTRAMUSCULAR; INTRAVENOUS AS NEEDED
Status: DISCONTINUED | OUTPATIENT
Start: 2020-08-07 | End: 2020-08-07 | Stop reason: HOSPADM

## 2020-08-07 RX ORDER — PROPOFOL 10 MG/ML
INJECTION, EMULSION INTRAVENOUS
Status: DISCONTINUED | OUTPATIENT
Start: 2020-08-07 | End: 2020-08-07 | Stop reason: HOSPADM

## 2020-08-07 RX ORDER — FLUMAZENIL 0.1 MG/ML
0.2 INJECTION INTRAVENOUS
Status: DISCONTINUED | OUTPATIENT
Start: 2020-08-07 | End: 2020-08-07 | Stop reason: HOSPADM

## 2020-08-07 RX ORDER — SODIUM CHLORIDE 0.9 % (FLUSH) 0.9 %
5-40 SYRINGE (ML) INJECTION EVERY 8 HOURS
Status: CANCELLED | OUTPATIENT
Start: 2020-08-07

## 2020-08-07 RX ORDER — LIDOCAINE HYDROCHLORIDE 10 MG/ML
0.1 INJECTION, SOLUTION EPIDURAL; INFILTRATION; INTRACAUDAL; PERINEURAL AS NEEDED
Status: DISCONTINUED | OUTPATIENT
Start: 2020-08-07 | End: 2020-08-07 | Stop reason: HOSPADM

## 2020-08-07 RX ADMIN — PROPOFOL 80 MCG/KG/MIN: 10 INJECTION, EMULSION INTRAVENOUS at 07:33

## 2020-08-07 RX ADMIN — FENTANYL CITRATE 25 MCG: 0.05 INJECTION, SOLUTION INTRAMUSCULAR; INTRAVENOUS at 07:52

## 2020-08-07 RX ADMIN — FENTANYL CITRATE 25 MCG: 0.05 INJECTION, SOLUTION INTRAMUSCULAR; INTRAVENOUS at 07:43

## 2020-08-07 RX ADMIN — SODIUM CHLORIDE, SODIUM LACTATE, POTASSIUM CHLORIDE, AND CALCIUM CHLORIDE: 600; 310; 30; 20 INJECTION, SOLUTION INTRAVENOUS at 07:25

## 2020-08-07 RX ADMIN — CEFAZOLIN SODIUM 2 G: 1 POWDER, FOR SOLUTION INTRAMUSCULAR; INTRAVENOUS at 07:36

## 2020-08-07 RX ADMIN — MIDAZOLAM HYDROCHLORIDE 3 MG: 2 INJECTION, SOLUTION INTRAMUSCULAR; INTRAVENOUS at 07:27

## 2020-08-07 RX ADMIN — MIDAZOLAM HYDROCHLORIDE 2 MG: 2 INJECTION, SOLUTION INTRAMUSCULAR; INTRAVENOUS at 07:29

## 2020-08-07 RX ADMIN — SODIUM CHLORIDE, SODIUM LACTATE, POTASSIUM CHLORIDE, AND CALCIUM CHLORIDE 125 ML/HR: 600; 310; 30; 20 INJECTION, SOLUTION INTRAVENOUS at 06:55

## 2020-08-07 NOTE — DISCHARGE INSTRUCTIONS
Sri Green, PRAKASH - Misha Duggan, PRAKASH - Patrizia Moon, PRAKASH         Podiatric Surgery Post-Operative Instructions    1. Foot surgery is more inconvenient than painful. Although it is normal to have some pain after surgery, you may be surprised at how well you actually feel. In spite of this, it is imperative that you follow the instructions carefully and prepare yourself and your family for an extended period of rest and convalescence. 2. Numbness in the feet will wear off after approximately five to twelve hours (this will vary patient to patient). 3. Start taking your pain medication immediately upon returning home. First take the anti-inflammatory medicine with food or milk. Take the anti-inflammatory even if you experience no pain. The painkiller is only for pain not relieved by the anti-inflammatory and should be taken only if needed. Stop the medication if you develop any abnormal rash, stomach pains, or unusual sensation and call the office. The following medications have been prescribed to you:    Rx pain killer: Managed per PCP    Rx antibiotic: Keflex 500 mg one tab by mouth twice daily for 14 days    4. Black and blue toes or black and blue in areas un-bandaged are a normal occurrence and resolves unremarkably after surgery. 5. Use a shower protector prescribed by your doctor (can obtain from [facility], drug stores), doubled up plastic trash bags with rubber bands, or you may sponge bathe. The bandage must be kept completely dry after surgery. Notify the doctor if your bandage becomes wet. YOU MUST NOT REMOVE YOUR DRESSING UNLESS INSTRUCTED OTHERWISE.  6. Your feet should be elevated above your hip at all times. DO not sit with the foot on a hassock or chair. When traveling, sit in the back seat with the foot elevated on pillows.   You should sleep with the surgical shoe on for the first three weeks post-operatively or until notified by your doctor that it is safe to be removed while sleeping. 7. Ice may be indicated for your surgery, you should put a cold pack behind the knee of the operative leg, 20 minutes on, 20 minutes off, especially in the first 1-2 days post op. This will reduce pain and swelling and aid in a faster recovery. Instructions for Walking After Foot Surgery    1. Weightbearing status: as tolerated to RT heel in surgical shoe. 2. Most patients can walk with only the surgical shoe after foot surgery (canes and walkers may be needed for additional stability) this will be determined by the doctor or nurses post-operatively. The following are important rules regarding walking after foot surgery for the first one to three weeks: You may walk or stand for bathroom or meals only. No prolonged standing or walking. No public places. Extremely limited use of stairs. Try to stay on one level of your home as long as possible during the day. General rule; 50 minutes off foot with limb elevated, 10 minutes on foot (maximum) for meals and bathroom only. When climbing stairs carefully advance one foot at a time (flatfooted) with assistance of a person and the banister or on your buttocks. Walking With the Surgical Shoe    The shoe should be snuggly applied as to avoid movement or shifting while attempting to stand. Please sleep with the shoe on as stated unless otherwise directed by your doctor. While walking, the right foot should be turned out at approximately 2 oclock. Left foot should be turned outward at approximately 10 oclock. Walk with the surgical foot rotated outward as shown. DO not pull toes up in the air by walking on your heel. Keep your foot flat and literally drag it along slightly off the ground surface. Do not push off or bend the front of your foot while walking (no propulsion).   Wear a shoe or sneaker of comparable heel height on the non-surgical foot. SIMPLY WALK ON THE FLAT OF YOUR FOOT WITH THE WEIGHT SHIFTED BACKWARDS TOWARDS THE HEEL AND THE BIG TOE TURNED OUTWARD. If you have additional questions or need assistance please call our office for emergency service 24 hours a day. Do not initiate any action or remove the bandages unless directed by your doctor. If you notice any excessive bleeding or have pain not relieved by the prescribed medications, please call the emergency number without hesitation. DISCHARGE SUMMARY from your Nurse      PATIENT INSTRUCTIONS    After general anesthesia or intravenous sedation, for 24 hours or while taking prescription Narcotics:  · Limit your activities  · Do not drive and operate hazardous machinery  · Do not make important personal or business decisions  · Do  not drink alcoholic beverages  · If you have not urinated within 8 hours after discharge, please contact your surgeon on call. Report the following to your surgeon:  · Excessive pain, swelling, redness or odor of or around the surgical area  · Temperature over 100.5  · Nausea and vomiting lasting longer than 4 hours or if unable to take medications  · Any signs of decreased circulation or nerve impairment to extremity: change in color, persistent  numbness, tingling, coldness or increase pain  · Any questions      GOOD HELP TO FIGHT AN INFECTION  Here are a few tip to help reduce the chance of getting an infection after surgery:   Wash Your Hands   Good handwashing is the most important thing you and your caregiver can do.  Wash before and after caring for any wounds. Dry your hand with a clean towel.  Wash with soap and water for at least 20 seconds. A TIP: sing the \"Happy Birthday\" song through one time while washing to help with the timing.  Use a hand  in between washings.      Shower   When your surgeon says it is OK to take a shower, use a new bar of antibacterial soap (if that is what you use, and keep that bar of soap ONLY for your use), or antibacterial body wash.  Use a clean wash cloth or sponge when you bathe.  Dry off with a clean towel  after every bath - be careful around any wounds, skin staples, sutures or surgical glue over/on wounds.  Do not enter swimming pools, hot tubs, lakes, rivers and/or ocean until wounds are healed and your doctor/surgeon says it is OK.  Use Clean Sheets   Sleep on freshly laundered sheets after your surgery.  Keep the surgery site covered with a clean, dry bandage (if instructed to do so). If the bandage becomes soiled, reapply a new, dry, clean bandage.  Do not allow pets to sleep with you while your wound is healing.  Lifestyle Modification and Controlling Your Blood Sugar   Smoking slows wound healing. Stop smoking and limit exposure to second-hand smoke.  High blood sugar slows wound healing. Eat a well-balanced diet to provide proper nutrition while healing   Monitor your blood sugar (if you are a diabetic) and take your medications as you are suppose to so you can control you blood sugar after surgery. COUGH AND DEEP BREATHE    Breathing deeply and coughing are very important exercises to do after surgery. Deep breathing and coughing open the little air tubes and air sacks in your lungs. You take deep breaths every day. You may not even notice - it is just something you do when you sigh or yawn. It is a natural exercise you do to keep these air passages open. After surgery, take deep breaths and cough, on purpose. DIRECTIONS:  · Take 10 to 15 slow deep breaths every hour while awake. · Breathe in deeply, and hold it for 2 seconds. · Exhale slowly through puckered lips, like blowing up a balloon. · After every 4th or 5th deep breath, hug your pillow to your chest or belly and give a hard, deep cough. Yes, it will probably hurt. But doing this exercise is a very important part of healing after surgery.   Take your pain medicine to help you do this exercise without too much pain. Coughing and deep breathing help prevent bronchitis and pneumonia after surgery. If you had chest or belly surgery, use a pillow as a \"hug lianne\" and hold it tightly to your chest or belly when you cough. ANKLE PUMPS    Ankle pumps increase the circulation of oxygenated blood to your lower extremities and decrease your risk for circulation problems such as blood clots. They also stretch the muscles, tendons and ligaments in your foot and ankle, and prevent joint contracture in the ankle and foot, especially after surgeries on the legs. It is important to do ankle pump exercises regularly after surgery because immobility increases your risk for developing a blood clot. Your doctor may also have you take an Aspirin for the next few days as well. If your doctor did not ask you to take an Aspirin, consult with him before starting Aspirin therapy on your own. The exercise is quite simple. · Slowly point your foot forward, feeling the muscles on the top of your lower leg stretch, and hold this position for 5 seconds. · Next, pull your foot back toward you as far as possible, stretching the calf muscles, and hold that position for 5 seconds. · Repeat with the other foot. · Perform 10 repetitions every hour while awake for both ankles if possible (down and then up with the foot once is one repetition). You should feel gentle stretching of the muscles in your lower leg when doing this exercise. If you feel pain, or your range of motion is limited, don't push too hard. Only go the limit your joint and muscles will let you go. If you have increasing pain, progressively worsening leg warmth or swelling, STOP the exercise and call your doctor. MEDICATION AND   SIDE EFFECT GUIDE    The [facility] MEDICATION AND SIDE EFFECT GUIDE was provided to the PATIENT AND CARE PROVIDER.   Information provided includes instruction about drug purpose and common side effects for the following medications:   · ***        These are general instructions for a healthy lifestyle:    *   Please give a list of your current medications to your Primary Care Provider. *   Please update this list whenever your medications are discontinued, doses are changed, or new medications (including over-the-counter products) are added. *   Please carry medication information at all times in case of emergency situations. About Smoking  No smoking / No tobacco products  Avoid exposure to second hand smoke     Surgeon General's Warning:  Quitting smoking now greatly reduces serious risk to your health. Obesity, smoking, and sedentary lifestyle greatly increases your risk for illness and disease. A healthy diet, regular physical exercise & weight monitoring are important for maintaining a healthy lifestyle. Congestive Heart Failure  You may be retaining fluid if you have a history of heart failure or if you experience any of the following symptoms:  Weight gain of 3 pounds or more overnight or 5 pounds in a week, increased swelling in your hands or feet or shortness of breath while lying flat in bed. Please call your doctor as soon as you notice any of these symptoms; do not wait until your next office visit. Recognize signs and symptoms of STROKE:  F -  Face looks uneven  A -  Arms unable to move or move evenly  S -  Speech slurred or non-existent  T -  Time-call 911 as soon as signs and symptoms begin-DO NOT go          back to bed or wait to see if you get better-TIME IS BRAIN. Warning Signs of HEART ATTACK   Call 911 if you have these symptoms:     Chest discomfort. Most heart attacks involve discomfort in the center of the chest that lasts more than a few minutes, or that goes away and comes back. It can feel like uncomfortable pressure, squeezing, fullness, or pain.    Discomfort in other areas of the upper body. Symptoms can include pain or discomfort in one or both arms, the back, neck, jaw, or stomach.  Shortness of breath with or without chest discomfort.  Other signs may include breaking out in a cold sweat, nausea, or lightheadedness. Don't wait more than five minutes to call 911 - MINUTES MATTER! Fast action can save your life. Calling 911 is almost always the fastest way to get lifesaving treatment. Emergency Medical Services staff can begin treatment when they arrive -- up to an hour sooner than if someone gets to the hospital by car. Learning About Coronavirus [phone])  Coronavirus [phone]): Overview  What is coronavirus (COVID-19)? The coronavirus disease (COVID-19) is caused by a virus. It is an illness that was first found in [place], [place], in December 2019. It has since spread worldwide. The virus can cause fever, cough, and trouble breathing. In severe cases, it can cause pneumonia and make it hard to breathe without help. It can cause death. Coronaviruses are a large group of viruses. They cause the common cold. They also cause more serious illnesses like Middle East respiratory syndrome (MERS) and severe acute respiratory syndrome (SARS). COVID-19 is caused by a novel coronavirus. That means it's a new type that has not been seen in people before. This virus spreads person-to-person through droplets from coughing and sneezing. It can also spread when you are close to someone who is infected. And it can spread when you touch something that has the virus on it, such as a doorknob or a tabletop. What can you do to protect yourself from coronavirus (COVID-19)? The best way to protect yourself from getting sick is to:  · Avoid areas where there is an outbreak. · Avoid contact with people who may be infected. · Wash your hands often with soap or alcohol-based hand sanitizers. · Avoid crowds and try to stay at least 6 feet away from other people.   · Wash your hands often, especially after you cough or sneeze. Use soap and water, and scrub for at least 20 seconds. If soap and water aren't available, use an alcohol-based hand . · Avoid touching your mouth, nose, and eyes. What can you do to avoid spreading the virus to others? To help avoid spreading the virus to others:  · Cover your mouth with a tissue when you cough or sneeze. Then throw the tissue in the trash. · Use a disinfectant to clean things that you touch often. · Stay home if you are sick or have been exposed to the virus. Don't go to school, work, or public areas. And don't use public transportation. · If you are sick:  ? Leave your home only if you need to get medical care. But call the doctor's office first so they know you're coming. And wear a face mask, if you have one.  ? If you have a face mask, wear it whenever you're around other people. It can help stop the spread of the virus when you cough or sneeze. ? Clean and disinfect your home every day. Use household  and disinfectant wipes or sprays. Take special care to clean things that you grab with your hands. These include doorknobs, remote controls, phones, and handles on your refrigerator and microwave. And don't forget countertops, tabletops, bathrooms, and computer keyboards. When to call for help  Call 911 anytime you think you may need emergency care. For example, call if:  · You have severe trouble breathing. (You can't talk at all.)  · You have constant chest pain or pressure. · You are severely dizzy or lightheaded. · You are confused or can't think clearly. · Your face and lips have a blue color. · You pass out (lose consciousness) or are very hard to wake up. Call your doctor now if you develop symptoms such as:  · Shortness of breath. · Fever. · Cough. If you need to get care, call ahead to the doctor's office for instructions before you go.  Make sure you wear a face mask, if you have one, to prevent exposing other people to the virus.  Where can you get the latest information? The following health organizations are tracking and studying this virus. Their websites contain the most up-to-date information. Torrie Lay also learn what to do if you think you may have been exposed to the virus. · U.S. Centers for Disease Control and Prevention (CDC): The CDC provides updated news about the disease and travel advice. The website also tells you how to prevent the spread of infection. www.cdc.gov  · World Health Organization [facility]): WHO offers information about the virus outbreaks. WHO also has travel advice. www.who.int  Current as of: April 1, 2020               Content Version: 12.4  © 8264-8679 [employer]. Care instructions adapted under license by your healthcare professional. If you have questions about a medical condition or this instruction, always ask your healthcare professional. [address] any warranty or liability for your use of this information. The discharge information has been reviewed with the {PATIENT PARENT GUARDIAN:67575}. Any questions and concerns from the {PATIENT PARENT GUARDIAN:52168} have been addressed. The {PATIENT PARENT GUARDIAN:72852} verbalized understanding. Other information in your discharge envelope:  []     PRESCRIPTIONS  []     PHYSICAL THERAPY PRESCRIPTION  []     APPOINTMENT CARDS  []     Regional Anesthesia Pamphlet for block or block with On-Q Catheter from   Anesthesia Service  []     Medical device information sheets/pamphlets from their    []     School/work excuse note. []     /parent work excuse note. The following personal items collected during your admission are returned to you:   Dental Appliance: Dental Appliances: None  Vision: Visual Aid: Glasses, With patient  Hearing Aid:    Jewelry: Jewelry: None  Clothing: Clothing: Other (comment)(street clothes to locker)  Other Valuables:  Other Valuables: Eyeglasses  Valuables sent to safe:

## 2020-08-07 NOTE — OP NOTES
Operative Report      Procedure date: 8/7/2020       Surgeon:  Vero Wilder DPM     Assistant: none      Preop diagnosis:   1. RT foot Tailor's bunion      Post op diagnosis:   1. same      Procedure:  1. RT foot Reverse Silver Tailor's bunionctomy      Pathology: none      Anesthesia: local (20cc 1:1 mix 1% lidocaine and 0.5% marcaine) with MAC      Hemostasis: Ankle tourniquet at 250mmHg for 20 min      Estimated Blood loss: <  5 cc      Materials: 3-0 monocryl, 3-0 prolene      Injectables: none      Complications: none      Description of procedure:  Pre-operative patient identification was carried out by myself, then the patient was brought to the operating room and placed on the operating table in a supine position. After adequate anesthesia was obtained the RT LE was then prepped and draped in the normal sterile fashion. The operative LE was exanguinated and tournquet inflated to 250mmHg. Attention was directed to the dorsal aspect of the 5th metatarsal head of the RT foot where a 4 cm linear longitudinal incision was made lateral & parallel to the tendon of the extensor digitorum longus tendon & involved the contour of the deformity. The incision was opened through the subcutaneous tissues using sharp & blunt dissection. Care was taken to identify & retract all vital neural & vascular structures. All bleeders were ligated & cauterized as necessary. The periosteal & capsular structures were then carefully freed of their osseous attachments & reflected medially & laterally thus exposing the head of the 5th metatarsal at the operative site. Next, utilizing a sagittal bone saw, the dorsal & medial prominences were resected & passed from the operative field. All rough edges were then smoothed with sagittal bone saw. The wound was then flushed with copious amounts of sterile normal saline. The periosteal & capsular structures were reapproximated & coapted utilizing 3-0 Monocryl.  Redundant capsular tissue was resected as necessary. The subcuticular tissues were then reapproximated & coapted utilizing 3-0 monocryl and and the skin was reapproximated & coapted utilizing 3-0 Prolene in simple interrupted and vertical mattress stitches. Surgical site dressed with xeroform, dry 4x4 gauze, cling and Ace bandage. The patient tolerated the procedure and anesthesia well, and was transported from the operating room to the PACU with vital signs stable and with the neurovascular status of the operative foot intact.

## 2020-08-07 NOTE — ANESTHESIA POSTPROCEDURE EVALUATION
Procedure(s):  RIGHT FOOT REVERSE SILVER TAILOR'S BUNIONECTOMY (MAC/ LOCAL). MAC    Anesthesia Post Evaluation      Multimodal analgesia: multimodal analgesia not used between 6 hours prior to anesthesia start to PACU discharge  Patient location during evaluation: PACU  Patient participation: complete - patient participated  Level of consciousness: awake and alert  Pain score: 0  Pain management: satisfactory to patient  Airway patency: patent  Anesthetic complications: no  Cardiovascular status: acceptable  Respiratory status: acceptable  Hydration status: acceptable  Post anesthesia nausea and vomiting:  none      INITIAL Post-op Vital signs:   Vitals Value Taken Time   /58 8/7/2020  8:50 AM   Temp 36.4 °C (97.6 °F) 8/7/2020  8:24 AM   Pulse 59 8/7/2020  8:51 AM   Resp 15 8/7/2020  8:51 AM   SpO2 99 % 8/7/2020  8:51 AM   Vitals shown include unvalidated device data.

## 2020-08-07 NOTE — BRIEF OP NOTE
Brief Postoperative Note    Patient: Jose Miller  YOB: 1980  MRN: [id number]    Date of Procedure: 8/7/2020     Pre-Op Diagnosis: RIGHT TAILORS BUNION    Post-Op Diagnosis:same    Procedure(s):  RIGHT FOOT REVERSE SILVER TAILOR'S BUNIONECTOMY (MAC/ LOCAL)    Surgeon(s):  Garth Luna DPM    Surgical Assistant: None    Anesthesia: MAC     Estimated Blood Loss (mL): < 5 mL    Complications: none    Specimens: none    Implants: none    Drains: none    Findings: as dictated    Electronically Signed by Rachid Monroe DPM on 8/7/2020 at 8:32 AM

## 2020-08-07 NOTE — H&P
.Date of Surgery Update: Bob Jones was seen and examined. History and physical has been reviewed. The patient has been examined.  There have been no significant clinical changes since the completion of the originally dated History and Physical.    Signed By: Yecenia Tobias DPM     August 7, 2020 7:19 AM

## 2020-08-07 NOTE — ANESTHESIA PREPROCEDURE EVALUATION
Relevant Problems   No relevant active problems       Anesthetic History               Review of Systems / Medical History  Patient summary reviewed and pertinent labs reviewed    Pulmonary            Asthma : well controlled       Neuro/Psych         Psychiatric history (Anxiety)     Cardiovascular                  Exercise tolerance: >4 METS     GI/Hepatic/Renal     GERD: well controlled        Pertinent negatives: No PUD   Endo/Other      Hypothyroidism    Pertinent negatives: No morbid obesity   Other Findings   Comments: Previous Gastric Bypass (has lost 200 lbs)           Physical Exam    Airway  Mallampati: I  TM Distance: 4 - 6 cm  Neck ROM: normal range of motion   Mouth opening: Normal     Cardiovascular    Rhythm: regular  Rate: normal         Dental  No notable dental hx       Pulmonary  Breath sounds clear to auscultation               Abdominal  GI exam deferred       Other Findings            Anesthetic Plan    ASA: 2  Anesthesia type: MAC          Induction: Intravenous  Anesthetic plan and risks discussed with: Patient

## 2020-11-16 ENCOUNTER — APPOINTMENT (OUTPATIENT)
Dept: GENERAL RADIOLOGY | Age: 40
End: 2020-11-16
Attending: EMERGENCY MEDICINE
Payer: MEDICAID

## 2020-11-16 ENCOUNTER — HOSPITAL ENCOUNTER (EMERGENCY)
Age: 40
Discharge: HOME | End: 2020-11-16
Attending: EMERGENCY MEDICINE | Admitting: EMERGENCY MEDICINE
Payer: MEDICAID

## 2020-11-16 VITALS
RESPIRATION RATE: 15 BRPM | BODY MASS INDEX: 29.64 KG/M2 | HEIGHT: 68 IN | SYSTOLIC BLOOD PRESSURE: 116 MMHG | OXYGEN SATURATION: 99 % | TEMPERATURE: 98.6 F | HEART RATE: 73 BPM | DIASTOLIC BLOOD PRESSURE: 52 MMHG | WEIGHT: 195.55 LBS

## 2020-11-16 DIAGNOSIS — J45.21 MILD INTERMITTENT ASTHMA WITH ACUTE EXACERBATION: ICD-10-CM

## 2020-11-16 DIAGNOSIS — J06.9 VIRAL URI WITH COUGH: Primary | ICD-10-CM

## 2020-11-16 PROCEDURE — 94640 AIRWAY INHALATION TREATMENT: CPT

## 2020-11-16 PROCEDURE — 87635 SARS-COV-2 COVID-19 AMP PRB: CPT

## 2020-11-16 PROCEDURE — 99284 EMERGENCY DEPT VISIT MOD MDM: CPT

## 2020-11-16 PROCEDURE — 74011636637 HC RX REV CODE- 636/637: Performed by: EMERGENCY MEDICINE

## 2020-11-16 PROCEDURE — 74011000250 HC RX REV CODE- 250: Performed by: EMERGENCY MEDICINE

## 2020-11-16 PROCEDURE — 71045 X-RAY EXAM CHEST 1 VIEW: CPT

## 2020-11-16 RX ORDER — PREDNISONE 20 MG/1
60 TABLET ORAL
Status: COMPLETED | OUTPATIENT
Start: 2020-11-16 | End: 2020-11-16

## 2020-11-16 RX ORDER — AZITHROMYCIN 250 MG/1
TABLET, FILM COATED ORAL
Qty: 6 TAB | Refills: 0 | Status: SHIPPED | OUTPATIENT
Start: 2020-11-16 | End: 2022-03-01

## 2020-11-16 RX ORDER — PREDNISONE 20 MG/1
60 TABLET ORAL DAILY
Qty: 12 TAB | Refills: 0 | Status: SHIPPED | OUTPATIENT
Start: 2020-11-16 | End: 2020-11-20

## 2020-11-16 RX ORDER — IPRATROPIUM BROMIDE 0.5 MG/2.5ML
0.5 SOLUTION RESPIRATORY (INHALATION)
Qty: 120 VIAL | Refills: 0 | Status: SHIPPED | OUTPATIENT
Start: 2020-11-16 | End: 2020-12-16

## 2020-11-16 RX ADMIN — PREDNISONE 60 MG: 20 TABLET ORAL at 11:38

## 2020-11-16 RX ADMIN — ALBUTEROL SULFATE 1 DOSE: 2.5 SOLUTION RESPIRATORY (INHALATION) at 11:58

## 2020-11-16 RX ADMIN — ALBUTEROL SULFATE 1 DOSE: 2.5 SOLUTION RESPIRATORY (INHALATION) at 11:39

## 2020-11-16 RX ADMIN — ALBUTEROL SULFATE 1 DOSE: 2.5 SOLUTION RESPIRATORY (INHALATION) at 12:16

## 2020-11-16 NOTE — ED PROVIDER NOTES
The history is provided by the patient. Flu   This is a new problem. The current episode started 2 days ago. The problem occurs constantly. The problem has not changed since onset. The cough is non-productive. There has been no fever. Associated symptoms include headaches, rhinorrhea, sore throat, shortness of breath and wheezing. Pertinent negatives include no chest pain, no chills, no sweats, no weight loss, no eye redness, no ear congestion, no ear pain, no myalgias, no nausea, no vomiting and no confusion. She has tried nebulizers for the symptoms. The treatment provided mild relief. She is a smoker. Her past medical history is significant for asthma. Her past medical history does not include bronchitis, pneumonia, bronchiectasis, COPD, emphysema, cancer, heart failure or CHF. Past Medical History:   Diagnosis Date    Adverse effect of anesthesia     WOKE UP DURING ENDOSCOPY WITH KETAMINE AND PATIENT REPORTS FEELING LIKE SHE WAS HALLUCINATING - BUT DID OK WITH PROPOFOL IN PAST WITH NO ISSUES PER PATIENT    Arthritis     BILATERAL KNEES- OA    Asthma     related to seasonal allergies    Chronic pain     KNEES    GERD (gastroesophageal reflux disease)     Hiatal hernia     Hypothyroid     Morbid obesity ([address]     Psychiatric disorder     anxeity    PUD (peptic ulcer disease)     PRE ULCER -    Second hand smoke exposure        Past Surgical History:   Procedure Laterality Date    ABDOMEN SURGERY PROC UNLISTED  2019    gastric bypass    HX  SECTION      HX ENDOSCOPY      HX GASTRIC BYPASS  2019    HX GI  2016    ENDOSCOPY    HX GI      HX LAP GASTRIC BYPASS  2019    Laparoscopic gastric bypass by Dr. Lucia Peraza.          Family History:   Problem Relation Age of Onset   22 [facility] Arthritis-rheumatoid Mother    [address] [facility] COPD Mother     Diabetes Father     Stroke Father     Hypertension Father     Cancer Maternal Uncle     Diabetes Paternal Grandmother     No Known Problems Brother     Breast Cancer Neg Hx     Ovarian Cancer Neg Hx     Uterine Cancer Neg Hx     Anesth Problems Neg Hx        Social History     Socioeconomic History    Marital status: SINGLE     Spouse name: Not on file    Number of children: Not on file    Years of education: Not on file    Highest education level: Not on file   Occupational History    Not on file   Social Needs    Financial resource strain: Not on file    Food insecurity     Worry: Not on file     Inability: Not on file    Transportation needs     Medical: Not on file     Non-medical: Not on file   Tobacco Use    Smoking status: Former Smoker     Packs/day: 0.25     Years: 15.00     Pack years: 3.75     Last attempt to quit: 2020     Years since quittin.3    Smokeless tobacco: Never Used   Substance and Sexual Activity    Alcohol use: Yes     Comment: OCCASIONALLY    Drug use: No    Sexual activity: Never     Partners: Male   Lifestyle    Physical activity     Days per week: Not on file     Minutes per session: Not on file    Stress: Not on file   Relationships    Social connections     Talks on phone: Not on file     Gets together: Not on file     Attends Bahai service: Not on file     Active member of club or organization: Not on file     Attends meetings of clubs or organizations: Not on file     Relationship status: Not on file    Intimate partner violence     Fear of current or ex partner: Not on file     Emotionally abused: Not on file     Physically abused: Not on file     Forced sexual activity: Not on file   Other Topics Concern    Not on file   Social History Narrative    Not on file         ALLERGIES: Percocet [oxycodone-acetaminophen]    Review of Systems   Constitutional: Negative for activity change, chills, fever and weight loss. HENT: Positive for rhinorrhea and sore throat. Negative for ear pain, nosebleeds, trouble swallowing and voice change. Eyes: Negative for redness and visual disturbance. Respiratory: Positive for cough, shortness of breath and wheezing. Cardiovascular: Negative for chest pain and palpitations. Gastrointestinal: Negative for abdominal pain, constipation, diarrhea, nausea and vomiting. Genitourinary: Negative for difficulty urinating, dysuria, hematuria and urgency. Musculoskeletal: Negative for back pain, myalgias, neck pain and neck stiffness. Skin: Negative for color change. Allergic/Immunologic: Negative for immunocompromised state. Neurological: Positive for headaches. Negative for dizziness, seizures, syncope, weakness, light-headedness and numbness. Psychiatric/Behavioral: Negative for behavioral problems, confusion, hallucinations, self-injury and suicidal ideas. Vitals:    11/16/20 1118 11/16/20 1123   BP: (!) 99/53 108/62   Pulse: 77    Resp: 16    Temp: 98.6 °F (37 °C)    SpO2: 98%    Weight: 88.7 kg (195 lb 8.8 oz)    Height: 5' 8\" (1.727 m)             Physical Exam  Vitals signs and nursing note reviewed. Constitutional:       General: She is not in acute distress. Appearance: She is well-developed. She is not diaphoretic. HENT:      Head: Normocephalic and atraumatic. Eyes:      Pupils: Pupils are equal, round, and reactive to light. Neck:      Musculoskeletal: Normal range of motion and neck supple. Cardiovascular:      Rate and Rhythm: Normal rate and regular rhythm. Heart sounds: Normal heart sounds. No murmur. No friction rub. No gallop. Pulmonary:      Effort: Pulmonary effort is normal. No respiratory distress. Breath sounds: Wheezing and rales present. Abdominal:      General: Bowel sounds are normal. There is no distension. Palpations: Abdomen is soft. Tenderness: There is no abdominal tenderness. There is no guarding or rebound. Musculoskeletal: Normal range of motion. Skin:     General: Skin is warm. Findings: No rash.    Neurological:      Mental Status: She is alert and oriented to person, place, and time. Psychiatric:         Behavior: Behavior normal.         Thought Content: Thought content normal.         Judgment: Judgment normal.          MDM     This is a 40-year-old female with past medical history, review of systems, physical exam as above, presenting with complaints of 2 days of flulike, upper respiratory infection-like symptoms. Patient states symptoms developed Saturday night, after being in a social gathering, exposed to wood smoke. She endorses 1 pack/week tobacco use, and a history of asthma. She states she has been using her nebulizer at home for wheezing and nonproductive cough. She endorses headache, sore throat, ear pressure, denying fevers or myalgias. She denies GI symptoms of nausea, vomiting, diarrhea. Physical exam is remarkable for well-appearing middle-aged female, in no acute distress, noted to be mildly hypotensive, which the patient states is baseline for her, afebrile, without tachycardia, satting 98% on room air. She has soft nontender abdomen, regular rate and rhythm without murmurs gallops or rubs, scattered wheezes and rails on auscultation. Suspect URI induced asthma exacerbation. Patient denies a history of COPD. Plan to obtain chest x-ray, administer duo nebs and prednisone, patient noted to be satting well on room air, will swab for coronavirus and likely discharge home with conservative therapy and return precautions. Procedures    Update:  Patient's pulmonary exam significantly improved status post nebulizer treatment and steroids. Likely viral induced asthma exacerbation, possible coronavirus. Will discharge home with steroids and azithromycin for risk factors, add ipratropium to her albuterol regimen, advised primary care follow-up, return precautions given.

## 2020-11-16 NOTE — ED NOTES
The patient was discharged home by Dr Marley Trejo in stable condition. The patient is alert and oriented, in no respiratory distress. The patient's diagnosis, condition and treatment were explained. The patient expressed understanding. A discharge plan has been developed. A  was not involved in the process. Aftercare instructions were given. Pt ambulatory out of the ED.  COVID swab obtained upon DC

## 2020-11-16 NOTE — Clinical Note
[facility] EMERGENCY DEPT 
[address] [facility] [place] [address] 
[phone] Work/School Note Date: 11/16/2020 To Whom It May concern: 
 
Negra Rush was evaulated by the following provider(s): 
Attending Provider: Edwar Zapien [address] [place] [address] virus is suspected. Per the CDC guidelines we recommend home isolation until the following conditions are all met: 1. At least 10 days have passed since symptoms first appeared and 2. At least 24 hours have passed since last fever without the use of fever-reducing medications and 
3. Symptoms (e.g., cough, shortness of breath) have improved Sincerely, 
 
 
 
 
Fadia Aleman MD

## 2020-11-16 NOTE — ED TRIAGE NOTES
Patient stopped smoking 3 weeks ago; has been a smoker since age 23. \" My daughter and I were coughing and wheezing for 1 week, a couple of weeks ago, and I used my inhaler and nebulizer, and then we got well. \" Last Monday the patient began working in a  with 33 year olds and \"they all have snotty noses, and I began with a cough and congestion again. \" No fever. Her doctor would not see her in the office. \" Just to let you know, all the women in my family have a lower blood pressure when it gets checked. \"

## 2020-11-17 ENCOUNTER — PATIENT OUTREACH (OUTPATIENT)
Dept: CASE MANAGEMENT | Age: 40
End: 2020-11-17

## 2020-11-17 NOTE — PROGRESS NOTES
Patient contacted regarding COVID-19  risk. Discussed COVID-19 related testing which was pending at this time. Test results were pending. Patient informed of results, if available? pendign. Outreach made within 2 business days of discharge: Yes    Care Transition Nurse/ Ambulatory Care Manager/ LPN Care Coordinator contacted the patient by telephone to perform post discharge assessment. Verified name and  with patient as identifiers. Provided introduction to self, and explanation of the CTN/ACM/LPN role, and reason for call due to risk factors for infection and/or exposure to COVID-19. Symptoms reviewed with patient who verbalized the following symptoms: cough, no new symptoms and no worsening symptoms. Due to no new or worsening symptoms encounter was not routed to provider for escalation. Discussed follow-up appointments. If no appointment was previously scheduled, appointment scheduling offered: yes  [facility] follow up appointment(s): No future appointments. Non-[facility] follow up appointment(s): PCP Dr Boyd Villarreal, pending --patient states she will make appt. herself      Advance Care Planning:   Does patient have an Advance Directive: health care decision makers updated    Patient has following risk factors of: asthma. CTN/ACM/LPN reviewed discharge instructions, medical action plan and red flags such as increased shortness of breath, increasing fever and signs of decompensation with patient who verbalized understanding. Discussed exposure protocols and quarantine with CDC Guidelines What to do if you are sick with coronavirus disease .  Patient was given an opportunity for questions and concerns. The patient agrees to contact the [facility] exposure line [phone], local [facility]  ([phone]) and PCP office for questions related to their healthcare. CTN/ACM provided contact information for future needs.     Reviewed and educated patient on any new and changed medications related to discharge diagnosis. Patient/family/caregiver given information for [employer] and agrees to enroll yes  Patient's preferred e-mail:  n/a  Patient's preferred phone number: [phone]  Based on Loop alert triggers, patient will be contacted by nurse care manager for worsening symptoms. Pt will be further monitored by COVID Loop Team based on severity of symptoms and risk factors.

## 2020-11-19 LAB
COVID-19, XGCOVT: NOT DETECTED
HEALTH STATUS, XMCV2T: NORMAL
SOURCE, COVRS: NORMAL
SPECIMEN SOURCE, FCOV2M: NORMAL
SPECIMEN TYPE, XMCV1T: NORMAL

## 2021-04-13 ENCOUNTER — TELEPHONE (OUTPATIENT)
Dept: SURGERY | Age: 41
End: 2021-04-13

## 2021-08-03 PROBLEM — E66.01 OBESITY, MORBID (HCC): Status: RESOLVED | Noted: 2018-05-15 | Resolved: 2021-08-03

## 2021-09-28 ENCOUNTER — HOSPITAL ENCOUNTER (EMERGENCY)
Age: 41
Discharge: HOME | End: 2021-09-28
Attending: EMERGENCY MEDICINE
Payer: MEDICAID

## 2021-09-28 VITALS
WEIGHT: 177.25 LBS | BODY MASS INDEX: 26.86 KG/M2 | RESPIRATION RATE: 16 BRPM | TEMPERATURE: 99.2 F | OXYGEN SATURATION: 97 % | DIASTOLIC BLOOD PRESSURE: 77 MMHG | HEART RATE: 72 BPM | HEIGHT: 68 IN | SYSTOLIC BLOOD PRESSURE: 109 MMHG

## 2021-09-28 DIAGNOSIS — Z20.822 SUSPECTED COVID-19 VIRUS INFECTION: ICD-10-CM

## 2021-09-28 DIAGNOSIS — J06.9 VIRAL URI WITH COUGH: Primary | ICD-10-CM

## 2021-09-28 LAB — SARS-COV-2, COV2: NORMAL

## 2021-09-28 PROCEDURE — 99283 EMERGENCY DEPT VISIT LOW MDM: CPT

## 2021-09-28 PROCEDURE — U0005 INFEC AGEN DETEC AMPLI PROBE: HCPCS

## 2021-09-28 NOTE — Clinical Note
[address] EMERGENCY DEPT  [address] [facility]  [place] [address]  [phone]    Work/School Note    Date: 9/28/2021     To Whom It May concern:    Bill Mahan was evaulated by the following provider(s):  Attending Provider: Neri Kaur MD.   [address] virus is suspected. Per the CDC guidelines we recommend home isolation until the following conditions are all met:    1. At least 10 days have passed since symptoms first appeared and  2. At least 24 hours have passed since last fever without the use of fever-reducing medications and  3.  Symptoms (e.g., cough, shortness of breath) have improved    Sincerely,          Quiana Pisano MD

## 2021-09-29 LAB
SARS-COV-2, XPLCVT: NOT DETECTED
SOURCE, COVRS: NORMAL

## 2021-09-29 NOTE — ED TRIAGE NOTES
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 Pt report cough, congestion, and fatigue.

## 2021-09-29 NOTE — ED NOTES
Discharge instructions discussed with pt. Pt alert and oriented pt condition and VS stable upon discharge.

## 2021-09-29 NOTE — ED PROVIDER NOTES
40-year-old female with a past medical history significant for asthma, chronic pain, GERD, hiatal hernia, hypothyroidism, morbid obesity, anxiety, peptic ulcer disease who presents to the ED with a complaint of intractable cough, runny nose, congestion, general malaise for the past 2 days. The patient states that she has been in contact with her daughter who has a classmate who tested positive for Covid. The patient denies any fever and chills, neck and back pain, nausea, vomiting, diarrhea, constipation, dysuria, skin contact, recent travel           Past Medical History:   Diagnosis Date    Adverse effect of anesthesia     WOKE UP DURING ENDOSCOPY WITH KETAMINE AND PATIENT REPORTS FEELING LIKE SHE WAS HALLUCINATING - BUT DID OK WITH PROPOFOL IN PAST WITH NO ISSUES PER PATIENT    Arthritis     BILATERAL KNEES- OA    Asthma     related to seasonal allergies    Chronic pain     KNEES    GERD (gastroesophageal reflux disease)     Hiatal hernia     Hypothyroid     Morbid obesity ([address]     Psychiatric disorder     anxeity    PUD (peptic ulcer disease)     PRE ULCER -    Second hand smoke exposure        Past Surgical History:   Procedure Laterality Date    HX  SECTION      HX ENDOSCOPY      HX GASTRIC BYPASS  2019    HX GI   2018    ENDOSCOPY    HX GI      HX LAP GASTRIC BYPASS  2019    Laparoscopic gastric bypass by Dr. Elaina Amador.     IN ABDOMEN SURGERY PROC UNLISTED  2019    gastric bypass         Family History:   Problem Relation Age of Onset   22 [facility] Arthritis-rheumatoid Mother     COPD Mother     Diabetes Father     Stroke Father     Hypertension Father     Cancer Maternal Uncle     Diabetes Paternal Grandmother     No Known Problems Brother     Breast Cancer Neg Hx     Ovarian Cancer Neg Hx     Uterine Cancer Neg Hx     Anesth Problems Neg Hx        Social History     Socioeconomic History    Marital status: SINGLE     Spouse name: Not on file    Number of children: Not on file    Years of education: Not on file    Highest education level: Not on file   Occupational History    Not on file   Tobacco Use    Smoking status: Former Smoker     Packs/day: 0.25     Years: 15.00     Pack years: 3.75     Quit date: 2020     Years since quittin.1    Smokeless tobacco: Never Used   Substance and Sexual Activity    Alcohol use: Yes     Comment: OCCASIONALLY    Drug use: No    Sexual activity: Never     Partners: Male   Other Topics Concern    Not on file   Social History Narrative    Not on file     Social Determinants of Health     Financial Resource Strain:     Difficulty of Paying Living Expenses:    Food Insecurity:     Worried About Running Out of Food in the Last Year:     [address] in the Last Year:    Transportation Needs:     Lack of Transportation (Medical):  Lack of Transportation (Non-Medical):    Physical Activity:     Days of Exercise per Week:     Minutes of Exercise per Session:    Stress:     Feeling of Stress :    Social Connections:     Frequency of Communication with Friends and Family:     Frequency of Social Gatherings with Friends and Family:     Attends Tenriism Services:     Active Member of Clubs or Organizations:     Attends Club or Organization Meetings:     Marital Status:    Intimate Partner Violence:     Fear of Current or Ex-Partner:     Emotionally Abused:     Physically Abused:     Sexually Abused: ALLERGIES: Percocet [oxycodone-acetaminophen]    Review of Systems   All other systems reviewed and are negative. Vitals:    21 2255   BP: 115/69   Pulse: 72   Resp: 18   Temp: 99.2 °F (37.3 °C)   SpO2: 95%   Weight: 80.4 kg (177 lb 4 oz)   Height: 5' 8\" (1.727 m)            Physical Exam  Vitals and nursing note reviewed. Exam conducted with a chaperone present.      CONSTITUTIONAL: Well-appearing; well-nourished; in no apparent distress  HEAD: Normocephalic; atraumatic  EYES: PERRL; EOM intact; conjunctiva and sclera are clear bilaterally. ENT: No rhinorrhea; normal pharynx with no tonsillar hypertrophy; mucous membranes pink/moist, no erythema, no exudate. NECK: Supple; non-tender; no cervical lymphadenopathy  CARD: Normal S1, S2; no murmurs, rubs, or gallops. Regular rate and rhythm. RESP: Normal respiratory effort; breath sounds clear and equal bilaterally; no wheezes, rhonchi, or rales. ABD: Normal bowel sounds; non-distended; non-tender; no palpable organomegaly, no masses, no bruits. Back Exam: Normal inspection; no vertebral point tenderness, no CVA tenderness. Normal range of motion. EXT: Normal ROM in all four extremities; non-tender to palpation; no swelling or deformity; distal pulses are normal, no edema. SKIN: Warm; dry; no rash. NEURO:Alert and oriented x 3, coherent, ISA-XII grossly intact, sensory and motor are non-focal.        MDM  Number of Diagnoses or Management Options  Suspected COVID-19 virus infection  Viral URI with cough  Diagnosis management comments: Assessment: Viral URI with cough rule out COVID-19 infection. The patient has not been vaccinated    Plan: Education, reassurance, symptomatic treatment/COVID-19 swab/serial exam/ Monitor and Reevaluate.        Amount and/or Complexity of Data Reviewed  Clinical lab tests: ordered  Tests in the radiology section of CPT®: ordered and reviewed  Discussion of test results with the performing providers: yes  Decide to obtain previous medical records or to obtain history from someone other than the patient: yes  Obtain history from someone other than the patient: yes  Review and summarize past medical records: yes  Discuss the patient with other providers: yes    Risk of Complications, Morbidity, and/or Mortality  Presenting problems: low  Diagnostic procedures: low  Management options: low    Patient Progress  Patient progress: stable         Procedures    Progress Note:   Pt has been reexamined by Ada Wilson MD. Pt is feeling much better. Symptoms have improved. All available results have been reviewed with pt and any available family. Pt understands sx, dx, and tx in ED. Care plan has been outlined and questions have been answered. Pt is ready to go home. Will send home on viral URI with cough and COVID-19 infection. Outpatient referral with PCP as needed. Written by Damian Harrell MD,11:19 PM    .   .

## 2022-03-01 ENCOUNTER — HOSPITAL ENCOUNTER (EMERGENCY)
Age: 42
Discharge: HOME | End: 2022-03-01
Attending: EMERGENCY MEDICINE
Payer: MEDICAID

## 2022-03-01 VITALS
HEART RATE: 91 BPM | RESPIRATION RATE: 14 BRPM | WEIGHT: 180 LBS | SYSTOLIC BLOOD PRESSURE: 113 MMHG | TEMPERATURE: 98.6 F | BODY MASS INDEX: 27.28 KG/M2 | HEIGHT: 68 IN | DIASTOLIC BLOOD PRESSURE: 79 MMHG | OXYGEN SATURATION: 99 %

## 2022-03-01 DIAGNOSIS — Z20.822 SUSPECTED COVID-19 VIRUS INFECTION: Primary | ICD-10-CM

## 2022-03-01 LAB — DEPRECATED S PYO AG THROAT QL EIA: NEGATIVE

## 2022-03-01 PROCEDURE — 87070 CULTURE OTHR SPECIMN AEROBIC: CPT

## 2022-03-01 PROCEDURE — 99283 EMERGENCY DEPT VISIT LOW MDM: CPT

## 2022-03-01 PROCEDURE — 87880 STREP A ASSAY W/OPTIC: CPT

## 2022-03-01 NOTE — DISCHARGE INSTRUCTIONS
Thank you for allowing us to provide you with medical care today. We realize that you have many choices for your emergency care needs. We thank you for choosing [facility]. Please choose us in the future for any continued health care needs. The exam and treatment you received in the Emergency Department were for an emergent problem and are not intended as complete care. It is important that you follow up with a doctor, nurse practitioner, or physician's assistant for ongoing care. If your symptoms worsen or you do not improve as expected and you are unable to reach your usual health care provider, you should return to the Emergency Department. We are available 24 hours a day. Please make an appointment with your health care provider(s) for follow up of your Emergency Department visit. Take this sheet with you when you go to your follow-up visit.

## 2022-03-01 NOTE — ED TRIAGE NOTES
Patient c/o head congestion for several days. \"I just started coughing today and I could not taste my coffee this morning. \"

## 2022-03-01 NOTE — ED PROVIDER NOTES
47-year-old female presents with sore throat, runny nose bilateral ear pain for the last day. She has not been vaccinated gets Covid. She denies shortness of breath or chest pain. Past Medical History:   Diagnosis Date    Adverse effect of anesthesia     WOKE UP DURING ENDOSCOPY WITH KETAMINE AND PATIENT REPORTS FEELING LIKE SHE WAS HALLUCINATING - BUT DID OK WITH PROPOFOL IN PAST WITH NO ISSUES PER PATIENT    Arthritis     BILATERAL KNEES- OA    Asthma     related to seasonal allergies    Chronic pain     KNEES    GERD (gastroesophageal reflux disease)     Hiatal hernia     Hypothyroid     Morbid obesity ([address]     Psychiatric disorder     anxeity    PUD (peptic ulcer disease)     PRE ULCER -    Second hand smoke exposure        Past Surgical History:   Procedure Laterality Date    HX  SECTION      HX ENDOSCOPY      HX GASTRIC BYPASS  2019    HX GI  2016     ENDOSCOPY    HX GI      HX LAP GASTRIC BYPASS  2019    Laparoscopic gastric bypass by Dr. Paulina Mays.     [place] ABDOMEN SURGERY PROC UNLISTED  2019    gastric bypass         Family History:   Problem Relation Age of Onset   [place] Arthritis-rheumatoid Mother     COPD Mother     Diabetes Father     Stroke Father     Hypertension Father     Cancer Maternal Uncle     Diabetes Paternal Grandmother     No Known Problems Brother     Breast Cancer Neg Hx     Ovarian Cancer Neg Hx     Uterine Cancer Neg Hx     Anesth Problems Neg Hx        Social History     Socioeconomic History    Marital status: SINGLE     Spouse name: Not on file    Number of children: Not on file    Years of education: Not on file    Highest education level: Not on file   Occupational History    Not on file   Tobacco Use    Smoking status: Former Smoker     Packs/day: 0.25     Years: 15.00     Pack years: 3.75     Quit date: 2020     Years since quittin.6    Smokeless tobacco: Never Used   Substance and Sexual Activity    Alcohol use: Yes     Comment: OCCASIONALLY    Drug use: No    Sexual activity: Never     Partners: Male   Other Topics Concern    Not on file   Social History Narrative    Not on file     Social Determinants of Health     Financial Resource Strain:     Difficulty of Paying Living Expenses: Not on file   Food Insecurity:     Worried About Running Out of Food in the Last Year: Not on file    Ovi of Food in the Last Year: Not on file   Transportation Needs:     Lack of Transportation (Medical): Not on file    Lack of Transportation (Non-Medical): Not on file   Physical Activity:     Days of Exercise per Week: Not on file    Minutes of Exercise per Session: Not on file   Stress:     Feeling of Stress : Not on file   Social Connections:     Frequency of Communication with Friends and Family: Not on file    Frequency of Social Gatherings with Friends and Family: Not on file    Attends Zoroastrianism Services: Not on file    Active Member of [address] [place] [address] [employer] or Organizations: Not on file    Attends Club or Organization Meetings: Not on file    Marital Status: Not on file   Intimate Partner Violence:     Fear of Current or Ex-Partner: Not on file    Emotionally Abused: Not on file    Physically Abused: Not on file    Sexually Abused: Not on file   Housing Stability:     Unable to Pay for Housing in the Last Year: Not on file    Number of [place] in the Last Year: Not on file    Unstable Housing in the Last Year: Not on file         ALLERGIES: Percocet [oxycodone-acetaminophen]    Review of Systems   Constitutional: Negative for fever. HENT: Positive for rhinorrhea. Respiratory: Negative for shortness of breath. Cardiovascular: Negative for chest pain. Gastrointestinal: Negative for abdominal pain. Genitourinary: Negative for dysuria. Musculoskeletal: Negative for back pain. Skin: Negative for wound. Neurological: Negative for headaches. Psychiatric/Behavioral: Negative for confusion. There were no vitals filed for this visit. Physical Exam  Vitals and nursing note reviewed. Constitutional:       General: She is not in acute distress. Appearance: Normal appearance. She is not ill-appearing, toxic-appearing or diaphoretic. HENT:      Head: Normocephalic and atraumatic. Comments: Bilateral tonsillar enlargement without exudates or significant erythema. Uvula is midline. No peritonsillar abscess     Right Ear: Tympanic membrane normal.      Left Ear: Tympanic membrane normal.   Eyes:      Extraocular Movements: Extraocular movements intact. Cardiovascular:      Rate and Rhythm: Normal rate. Pulses: Normal pulses. Pulmonary:      Effort: Pulmonary effort is normal. No respiratory distress. Abdominal:      General: There is no distension. Musculoskeletal:         General: Normal range of motion. Cervical back: Normal range of motion. Skin:     General: Skin is dry. Neurological:      Mental Status: She is alert and oriented to person, place, and time. Psychiatric:         Mood and Affect: Mood normal.          MDM  Number of Diagnoses or Management Options  Suspected COVID-19 virus infection  Diagnosis management comments:       Strep negative. Patient encouraged to get outpatient Covid test.  Discussed my clinical impression(s), any labs and/or radiology results with the patient. I answered any questions and addressed any concerns. Discussed the importance of following up with their primary care physician and/or specialist(s). Discussed signs or symptoms that would warrant return back to the ER for further evaluation. The patient is agreeable with discharge.          Procedures

## 2022-03-03 ENCOUNTER — TELEPHONE (OUTPATIENT)
Dept: SURGERY | Age: 42
End: 2022-03-03

## 2022-03-03 LAB
BACTERIA SPEC CULT: NORMAL
SERVICE CMNT-IMP: NORMAL

## 2022-03-19 PROBLEM — E66.01 MORBID OBESITY (HCC): Status: ACTIVE | Noted: 2019-06-12

## 2022-06-11 ENCOUNTER — HOSPITAL ENCOUNTER (EMERGENCY)
Age: 42
Discharge: HOME | End: 2022-06-11
Attending: STUDENT IN AN ORGANIZED HEALTH CARE EDUCATION/TRAINING PROGRAM
Payer: MEDICAID

## 2022-06-11 ENCOUNTER — APPOINTMENT (OUTPATIENT)
Dept: GENERAL RADIOLOGY | Age: 42
End: 2022-06-11
Attending: STUDENT IN AN ORGANIZED HEALTH CARE EDUCATION/TRAINING PROGRAM
Payer: MEDICAID

## 2022-06-11 VITALS
HEART RATE: 79 BPM | HEIGHT: 68 IN | OXYGEN SATURATION: 99 % | TEMPERATURE: 98 F | RESPIRATION RATE: 18 BRPM | WEIGHT: 164.2 LBS | BODY MASS INDEX: 24.89 KG/M2 | SYSTOLIC BLOOD PRESSURE: 110 MMHG | DIASTOLIC BLOOD PRESSURE: 70 MMHG

## 2022-06-11 DIAGNOSIS — S63.621A SPRAIN OF INTERPHALANGEAL JOINT OF RIGHT THUMB, INITIAL ENCOUNTER: Primary | ICD-10-CM

## 2022-06-11 PROCEDURE — 99283 EMERGENCY DEPT VISIT LOW MDM: CPT

## 2022-06-11 PROCEDURE — 74011250637 HC RX REV CODE- 250/637: Performed by: STUDENT IN AN ORGANIZED HEALTH CARE EDUCATION/TRAINING PROGRAM

## 2022-06-11 PROCEDURE — 73140 X-RAY EXAM OF FINGER(S): CPT

## 2022-06-11 RX ORDER — IBUPROFEN 600 MG/1
600 TABLET ORAL
Status: DISCONTINUED | OUTPATIENT
Start: 2022-06-11 | End: 2022-06-11

## 2022-06-11 RX ORDER — ACETAMINOPHEN 325 MG/1
650 TABLET ORAL ONCE
Status: COMPLETED | OUTPATIENT
Start: 2022-06-11 | End: 2022-06-11

## 2022-06-11 RX ADMIN — ACETAMINOPHEN 650 MG: 325 TABLET ORAL at 21:10

## 2022-06-12 NOTE — ED PROVIDER NOTES
The patient is a 38-year-old female presenting today with a right thumb injury. Just prior to arrival she was playing with her kids and her right thumb got bent backwards. She now has bruising and swelling of the thumb. The majority of the pain is at the IP joint of the thumb. She has pain with flexion at this joint. No open wounds. No numbness. No other injury. Past Medical History:   Diagnosis Date    Adverse effect of anesthesia     WOKE UP DURING ENDOSCOPY WITH KETAMINE AND PATIENT REPORTS FEELING LIKE SHE WAS HALLUCINATING - BUT DID OK WITH PROPOFOL IN PAST WITH NO ISSUES PER PATIENT    Arthritis     BILATERAL KNEES- OA    Asthma     related to seasonal allergies    Chronic pain     KNEES    GERD (gastroesophageal reflux disease)     Hiatal hernia     Hypothyroid     Morbid obesity ([facility] [address]     Psychiatric disorder     anxeity    PUD (peptic ulcer disease)     PRE ULCER -    Second hand smoke exposure        Past Surgical History:   Procedure Laterality Date    HX  SECTION      HX ENDOSCOPY      HX GASTRIC BYPASS  2019    HX GI   2018    ENDOSCOPY    HX GI      HX LAP GASTRIC BYPASS  2019    Laparoscopic gastric bypass by Dr. Citlaly Diego.     [place] ABDOMEN SURGERY PROC UNLISTED  2019    gastric bypass         Family History:   Problem Relation Age of Onset   [facility] Arthritis-rheumatoid Mother     COPD Mother     Diabetes Father     Stroke Father     Hypertension Father     Cancer Maternal Uncle     Diabetes Paternal Grandmother     No Known Problems Brother     Breast Cancer Neg Hx     Ovarian Cancer Neg Hx     Uterine Cancer Neg Hx     Anesth Problems Neg Hx        Social History     Socioeconomic History    Marital status: SINGLE     Spouse name: Not on file    Number of children: Not on file    Years of education: Not on file    Highest education level: Not on file   Occupational History    Not on file   Tobacco Use    Smoking status: Former Smoker     Packs/day: 0.25     Years: 15.00     Pack years: 3.75     Quit date: 2020     Years since quittin.8    Smokeless tobacco: Never Used   Substance and Sexual Activity    Alcohol use: Yes     Comment: OCCASIONALLY    Drug use: No    Sexual activity: Never     Partners: Male   Other Topics Concern    Not on file   Social History Narrative    Not on file     Social Determinants of Health     Financial Resource Strain:     Difficulty of Paying Living Expenses: Not on file   Food Insecurity:     Worried About Running Out of Food in the Last Year: Not on file    Ovi of Food in the Last Year: Not on file   Transportation Needs:     Lack of Transportation (Medical): Not on file    Lack of Transportation (Non-Medical): Not on file   Physical Activity:     Days of Exercise per Week: Not on file    Minutes of Exercise per Session: Not on file   Stress:     Feeling of Stress : Not on file   Social Connections:     Frequency of Communication with Friends and Family: Not on file    Frequency of Social Gatherings with Friends and Family: Not on file    Attends Baptism Services: Not on file    Active Member of [address] [place] [address] or Organizations: Not on file    Attends Club or Organization Meetings: Not on file    Marital Status: Not on file   Intimate Partner Violence:     Fear of Current or Ex-Partner: Not on file    Emotionally Abused: Not on file    Physically Abused: Not on file    Sexually Abused: Not on file   Housing Stability:     Unable to Pay for Housing in the Last Year: Not on file    Number of [place] in the Last Year: Not on file    Unstable Housing in the Last Year: Not on file         ALLERGIES: Percocet [oxycodone-acetaminophen]    Review of Systems   Musculoskeletal: Positive for arthralgias and joint swelling. All other systems reviewed and are negative.       Vitals:    22   BP: 110/70   Pulse: 79   Resp: 18   Temp: 98 °F (36.7 °C)   SpO2: 99%   Weight: 74.5 kg (164 lb 3.2 oz)   Height: 5' 8\" (1.727 m)            Physical Exam  Constitutional:       General: She is not in acute distress. Appearance: She is well-developed. HENT:      Head: Normocephalic. Eyes:      Conjunctiva/sclera: Conjunctivae normal.   Pulmonary:      Effort: Pulmonary effort is normal. No respiratory distress. Musculoskeletal:      Cervical back: Normal range of motion. Comments: Right upper extremity: There is swelling of the right thumb mostly at the interphalangeal joint with associated ecchymosis. She is able to flex and extend at the MCP joint, able to flex and extend when I isolated the IP joint however painfully and slightly limited. Cap refill less than 3 seconds. Sensory intact. No open wounds. No snuffbox tenderness   Skin:     General: Skin is warm and dry. Capillary Refill: Capillary refill takes less than 2 seconds. Psychiatric:         Behavior: Behavior normal.          MDM       Procedures    40-year-old female here today with a right thumb injury. Suspect sprain versus partial ligamentous tear. Placed in Velcro splint that immobilized the metacarpal joint as well as the IP joint. Tylenol given for pain. X-ray negative for fracture. Advised hand surgery follow-up within 1 week. ICD-10-CM ICD-9-CM    1.  Sprain of interphalangeal joint of right thumb, initial encounter  I09.464U 842.13      BRAYAN Hughes DO

## 2022-06-12 NOTE — ED TRIAGE NOTES
Patient arrives with c/o R thumb injury PTA while wrestling with kids, her R thumb bent back \"all the way to my hand\" when nephew fell onto hand. Presents with pain, swelling and limited movement. PVS intact. Ice pack applied.

## 2023-09-28 ENCOUNTER — OFFICE VISIT (OUTPATIENT)
Age: 43
End: 2023-09-28

## 2023-09-28 VITALS
DIASTOLIC BLOOD PRESSURE: 72 MMHG | HEART RATE: 84 BPM | BODY MASS INDEX: 27.52 KG/M2 | RESPIRATION RATE: 16 BRPM | TEMPERATURE: 98.7 F | WEIGHT: 181 LBS | OXYGEN SATURATION: 98 % | SYSTOLIC BLOOD PRESSURE: 109 MMHG

## 2023-09-28 DIAGNOSIS — G44.83 COUGH HEADACHE: ICD-10-CM

## 2023-09-28 DIAGNOSIS — J40 BRONCHITIS: ICD-10-CM

## 2023-09-28 DIAGNOSIS — J02.9 SORE THROAT: Primary | ICD-10-CM

## 2023-09-28 DIAGNOSIS — J45.41 MODERATE PERSISTENT ASTHMA WITH ACUTE EXACERBATION: ICD-10-CM

## 2023-09-28 LAB
Lab: NORMAL
PERFORMING INSTRUMENT: NORMAL
QC PASS/FAIL: NORMAL
SARS-COV-2, POC: NORMAL
STREP PYOGENES DNA, POC: NEGATIVE
VALID INTERNAL CONTROL, POC: NORMAL

## 2023-09-28 RX ORDER — AMOXICILLIN AND CLAVULANATE POTASSIUM 500; 125 MG/1; MG/1
1 TABLET, FILM COATED ORAL 2 TIMES DAILY
Qty: 20 TABLET | Refills: 0 | Status: SHIPPED | OUTPATIENT
Start: 2023-09-28 | End: 2023-10-08

## 2023-09-28 RX ORDER — METHYLPREDNISOLONE 4 MG/1
TABLET ORAL
Qty: 1 KIT | Refills: 0 | Status: SHIPPED | OUTPATIENT
Start: 2023-09-28 | End: 2023-10-04

## 2023-09-28 RX ORDER — IPRATROPIUM BROMIDE AND ALBUTEROL SULFATE 2.5; .5 MG/3ML; MG/3ML
1 SOLUTION RESPIRATORY (INHALATION) EVERY 4 HOURS
Qty: 60 EACH | Refills: 3 | Status: SHIPPED | OUTPATIENT
Start: 2023-09-28 | End: 2023-10-28

## 2023-09-28 RX ORDER — BUDESONIDE 0.5 MG/2ML
1 INHALANT ORAL 2 TIMES DAILY
Qty: 60 EACH | Refills: 3 | Status: SHIPPED | OUTPATIENT
Start: 2023-09-28 | End: 2023-10-28

## 2023-09-28 ASSESSMENT — ENCOUNTER SYMPTOMS
SINUS COMPLAINT: 1
COUGH: 1
RHINORRHEA: 1

## 2023-09-28 NOTE — PROGRESS NOTES
tablet; Take 1 tablet by mouth 2 times daily for 10 days  -     budesonide (PULMICORT) 0.5 MG/2ML nebulizer suspension; Take 2 mLs by nebulization 2 times daily  -     ipratropium 0.5 mg-albuterol 2.5 mg (DUONEB) 0.5-2.5 (3) MG/3ML SOLN nebulizer solution; Inhale 3 mLs into the lungs every 4 hours    Other orders  -     methylPREDNISolone (MEDROL, DG,) 4 MG tablet; Take by mouth. Return in about 1 week (around 10/5/2023), or if symptoms worsen or fail to improve, for Asthma as needed. [place] [employer] History provided by:  Patient   used: No    Sinus Problem  This is a new problem. The current episode started in the past 7 days. The problem has been gradually worsening since onset. Associated symptoms include congestion and coughing. Review of Systems   Constitutional:  Positive for fatigue. HENT:  Positive for congestion, postnasal drip and rhinorrhea. Respiratory:  Positive for cough. All other systems reviewed and are negative. Subjective:   Pt is a 37 y.o. female     Past Medical History:   Diagnosis Date    Adverse effect of anesthesia     WOKE UP DURING ENDOSCOPY WITH KETAMINE AND PATIENT REPORTS FEELING LIKE SHE WAS HALLUCINATING - BUT DID OK WITH PROPOFOL IN PAST WITH NO ISSUES PER PATIENT    Arthritis     BILATERAL KNEES- OA    Asthma     related to seasonal allergies    Chronic pain     KNEES    GERD (gastroesophageal reflux disease)     Hiatal hernia     Hypothyroid     Morbid obesity ([address])     Psychiatric disorder     anxeity    PUD (peptic ulcer disease)     PRE ULCER -    Second hand smoke exposure        Past Surgical History:   Procedure Laterality Date     SECTION      GASTRIC BYPASS SURGERY  2019    GASTRIC BYPASS SURGERY  2019    Laparoscopic gastric bypass by Dr. Santiago Loving.     GI      GI   2018    ENDOSCOPY    [place] UNLISTED PROCEDURE ABDOMEN PERITONEUM & OMENTUM  2019    gastric bypass    UPPER GASTROINTESTINAL ENDOSCOPY

## 2024-05-14 ENCOUNTER — NURSE ONLY (OUTPATIENT)
Age: 44
End: 2024-05-14
Payer: MEDICAID

## 2024-05-14 ENCOUNTER — OFFICE VISIT (OUTPATIENT)
Age: 44
End: 2024-05-14
Payer: MEDICAID

## 2024-05-14 VITALS
RESPIRATION RATE: 16 BRPM | HEART RATE: 88 BPM | DIASTOLIC BLOOD PRESSURE: 78 MMHG | OXYGEN SATURATION: 99 % | SYSTOLIC BLOOD PRESSURE: 112 MMHG | WEIGHT: 193.4 LBS | BODY MASS INDEX: 29.31 KG/M2 | HEIGHT: 68 IN | TEMPERATURE: 97.8 F

## 2024-05-14 DIAGNOSIS — E66.3 OVERWEIGHT (BMI 25.0-29.9): ICD-10-CM

## 2024-05-14 DIAGNOSIS — J45.20 MILD INTERMITTENT ASTHMA WITHOUT COMPLICATION: ICD-10-CM

## 2024-05-14 DIAGNOSIS — F17.210 CIGARETTE SMOKER: ICD-10-CM

## 2024-05-14 DIAGNOSIS — Z01.84 IMMUNITY STATUS TESTING: ICD-10-CM

## 2024-05-14 DIAGNOSIS — E07.9 THYROID DISORDER: ICD-10-CM

## 2024-05-14 DIAGNOSIS — R73.02 IMPAIRED GLUCOSE TOLERANCE: ICD-10-CM

## 2024-05-14 DIAGNOSIS — Z12.31 ENCOUNTER FOR SCREENING MAMMOGRAM FOR MALIGNANT NEOPLASM OF BREAST: ICD-10-CM

## 2024-05-14 DIAGNOSIS — J06.9 UPPER RESPIRATORY TRACT INFECTION, UNSPECIFIED TYPE: ICD-10-CM

## 2024-05-14 DIAGNOSIS — Z23 ENCOUNTER FOR IMMUNIZATION: ICD-10-CM

## 2024-05-14 DIAGNOSIS — F32.A ANXIETY AND DEPRESSION: Primary | ICD-10-CM

## 2024-05-14 DIAGNOSIS — F41.9 ANXIETY AND DEPRESSION: Primary | ICD-10-CM

## 2024-05-14 DIAGNOSIS — Z11.59 ENCOUNTER FOR HCV SCREENING TEST FOR LOW RISK PATIENT: ICD-10-CM

## 2024-05-14 DIAGNOSIS — Z12.11 SCREENING FOR MALIGNANT NEOPLASM OF COLON: ICD-10-CM

## 2024-05-14 DIAGNOSIS — L70.0 ACNE VULGARIS: ICD-10-CM

## 2024-05-14 PROCEDURE — 99204 OFFICE O/P NEW MOD 45 MIN: CPT | Performed by: FAMILY MEDICINE

## 2024-05-14 PROCEDURE — 90715 TDAP VACCINE 7 YRS/> IM: CPT | Performed by: FAMILY MEDICINE

## 2024-05-14 PROCEDURE — PBSHW TDAP, BOOSTRIX, (AGE 10 YRS+), IM: Performed by: FAMILY MEDICINE

## 2024-05-14 RX ORDER — DOXYCYCLINE 100 MG/1
100 CAPSULE ORAL DAILY
Qty: 90 CAPSULE | Refills: 1 | Status: SHIPPED | OUTPATIENT
Start: 2024-05-14

## 2024-05-14 RX ORDER — FLUOXETINE HYDROCHLORIDE 20 MG/1
20 CAPSULE ORAL DAILY
Qty: 30 CAPSULE | Refills: 3 | Status: SHIPPED | OUTPATIENT
Start: 2024-05-14

## 2024-05-14 RX ORDER — HYDROXYZINE 50 MG/1
50 TABLET, FILM COATED ORAL EVERY 6 HOURS PRN
Qty: 90 TABLET | Refills: 0 | Status: SHIPPED | OUTPATIENT
Start: 2024-05-14

## 2024-05-14 SDOH — ECONOMIC STABILITY: FOOD INSECURITY: WITHIN THE PAST 12 MONTHS, YOU WORRIED THAT YOUR FOOD WOULD RUN OUT BEFORE YOU GOT MONEY TO BUY MORE.: NEVER TRUE

## 2024-05-14 SDOH — ECONOMIC STABILITY: FOOD INSECURITY: WITHIN THE PAST 12 MONTHS, THE FOOD YOU BOUGHT JUST DIDN'T LAST AND YOU DIDN'T HAVE MONEY TO GET MORE.: NEVER TRUE

## 2024-05-14 SDOH — ECONOMIC STABILITY: HOUSING INSECURITY
IN THE LAST 12 MONTHS, WAS THERE A TIME WHEN YOU DID NOT HAVE A STEADY PLACE TO SLEEP OR SLEPT IN A SHELTER (INCLUDING NOW)?: NO

## 2024-05-14 SDOH — ECONOMIC STABILITY: INCOME INSECURITY: HOW HARD IS IT FOR YOU TO PAY FOR THE VERY BASICS LIKE FOOD, HOUSING, MEDICAL CARE, AND HEATING?: NOT HARD AT ALL

## 2024-05-14 ASSESSMENT — PATIENT HEALTH QUESTIONNAIRE - PHQ9
4. FEELING TIRED OR HAVING LITTLE ENERGY: SEVERAL DAYS
8. MOVING OR SPEAKING SO SLOWLY THAT OTHER PEOPLE COULD HAVE NOTICED. OR THE OPPOSITE, BEING SO FIGETY OR RESTLESS THAT YOU HAVE BEEN MOVING AROUND A LOT MORE THAN USUAL: NOT AT ALL
SUM OF ALL RESPONSES TO PHQ9 QUESTIONS 1 & 2: 2
1. LITTLE INTEREST OR PLEASURE IN DOING THINGS: SEVERAL DAYS
SUM OF ALL RESPONSES TO PHQ QUESTIONS 1-9: 6
3. TROUBLE FALLING OR STAYING ASLEEP: NOT AT ALL
5. POOR APPETITE OR OVEREATING: NOT AT ALL
6. FEELING BAD ABOUT YOURSELF - OR THAT YOU ARE A FAILURE OR HAVE LET YOURSELF OR YOUR FAMILY DOWN: MORE THAN HALF THE DAYS
SUM OF ALL RESPONSES TO PHQ QUESTIONS 1-9: 6
SUM OF ALL RESPONSES TO PHQ QUESTIONS 1-9: 6
7. TROUBLE CONCENTRATING ON THINGS, SUCH AS READING THE NEWSPAPER OR WATCHING TELEVISION: SEVERAL DAYS
9. THOUGHTS THAT YOU WOULD BE BETTER OFF DEAD, OR OF HURTING YOURSELF: NOT AT ALL
SUM OF ALL RESPONSES TO PHQ QUESTIONS 1-9: 6
10. IF YOU CHECKED OFF ANY PROBLEMS, HOW DIFFICULT HAVE THESE PROBLEMS MADE IT FOR YOU TO DO YOUR WORK, TAKE CARE OF THINGS AT HOME, OR GET ALONG WITH OTHER PEOPLE: SOMEWHAT DIFFICULT
2. FEELING DOWN, DEPRESSED OR HOPELESS: SEVERAL DAYS

## 2024-05-14 NOTE — PROGRESS NOTES
Identified pt with two pt identifiers(name and ).    Chief Complaint   Patient presents with    New Patient    Establish Care     Perimenopause is her only concern.     Orders     Colonoscopy and mammogram         Health Maintenance Due   Topic    Hepatitis B vaccine (1 of 3 - 3-dose series)    COVID-19 Vaccine (1)    Depression Screen     Hepatitis C screen     Lipids     Breast cancer screen     Cervical cancer screen     DTaP/Tdap/Td vaccine (2 - Td or Tdap)    Colorectal Cancer Screen        Wt Readings from Last 3 Encounters:   24 87.7 kg (193 lb 6.4 oz)   23 82.1 kg (181 lb)     Temp Readings from Last 3 Encounters:   24 97.8 °F (36.6 °C) (Temporal)   23 98.7 °F (37.1 °C) (Oral)     BP Readings from Last 3 Encounters:   24 112/78   23 109/72     Pulse Readings from Last 3 Encounters:   24 88   23 84           Depression Screening:  :         2024     3:44 PM   PHQ-9 Questionaire   Little interest or pleasure in doing things 1   Feeling down, depressed, or hopeless 1   Trouble falling or staying asleep, or sleeping too much 0   Feeling tired or having little energy 1   Poor appetite or overeating 0   Feeling bad about yourself - or that you are a failure or have let yourself or your family down 2   Trouble concentrating on things, such as reading the newspaper or watching television 1   Moving or speaking so slowly that other people could have noticed. Or the opposite - being so fidgety or restless that you have been moving around a lot more than usual 0   Thoughts that you would be better off dead, or of hurting yourself in some way 0   PHQ-9 Total Score 6   If you checked off any problems, how difficult have these problems made it for you to do your work, take care of things at home, or get along with other people? 1        Fall Risk Assessment:  :          No data to display                 Abuse Screening:  :          No data to display             
continued follow up.  More specific details regarding the plan for certain diagnoses immediately follows.    Will get back on her fluoxetine.  Informed that benzodiazepines and ambien are not first line--will give prn hydroxyzine.  Getting back on doxycycline for acne.  Suspect viral URI.  If there is bacterial component doxycycline should help with this.  No asthma exacerbation. Will plan to follow up with her in ~5 weeks for recheck of things.   Checking labs today.    45 minutes spent in chart review, interview/exam, counseling, placing orders/referrals, documentation.    Return in about 5 weeks (around 6/18/2024) for dep/anx follow up (virtual ok).     I have discussed the diagnosis with the patient and the intended treatment plan as seen in the above orders. The patient has received an after-visit summary and questions were answered concerning future plans. Asked to return should symptoms worsen or not improve with treatment. Any pending labs and studies will be relayed to patient when they become available.     Pt verbalizes understanding of plan of care and denies further questions or concerns at this time.     Justin Tran MD, FAAFP

## 2024-05-15 ENCOUNTER — TELEPHONE (OUTPATIENT)
Age: 44
End: 2024-05-15

## 2024-05-15 LAB
ALBUMIN SERPL-MCNC: 4 G/DL (ref 3.9–4.9)
ALBUMIN/GLOB SERPL: 1.3 {RATIO} (ref 1.2–2.2)
ALP SERPL-CCNC: 149 IU/L (ref 44–121)
ALT SERPL-CCNC: 30 IU/L (ref 0–32)
AST SERPL-CCNC: 48 IU/L (ref 0–40)
BASOPHILS # BLD AUTO: 0.1 X10E3/UL (ref 0–0.2)
BASOPHILS NFR BLD AUTO: 1 %
BILIRUB SERPL-MCNC: 0.4 MG/DL (ref 0–1.2)
BUN SERPL-MCNC: 9 MG/DL (ref 6–24)
BUN/CREAT SERPL: 14 (ref 9–23)
CALCIUM SERPL-MCNC: 8.9 MG/DL (ref 8.7–10.2)
CHLORIDE SERPL-SCNC: 105 MMOL/L (ref 96–106)
CHOLEST SERPL-MCNC: 165 MG/DL (ref 100–199)
CO2 SERPL-SCNC: 19 MMOL/L (ref 20–29)
CREAT SERPL-MCNC: 0.63 MG/DL (ref 0.57–1)
EGFRCR SERPLBLD CKD-EPI 2021: 111 ML/MIN/1.73
EOSINOPHIL # BLD AUTO: 0.2 X10E3/UL (ref 0–0.4)
EOSINOPHIL NFR BLD AUTO: 3 %
ERYTHROCYTE [DISTWIDTH] IN BLOOD BY AUTOMATED COUNT: 14.5 % (ref 11.7–15.4)
GLOBULIN SER CALC-MCNC: 3.2 G/DL (ref 1.5–4.5)
GLUCOSE SERPL-MCNC: 81 MG/DL (ref 70–99)
HBA1C MFR BLD: 5 % (ref 4.8–5.6)
HBV SURFACE AB SER QL: NON REACTIVE
HCT VFR BLD AUTO: 37.2 % (ref 34–46.6)
HCV IGG SERPL QL IA: NON REACTIVE
HDLC SERPL-MCNC: 93 MG/DL
HGB BLD-MCNC: 12.5 G/DL (ref 11.1–15.9)
IMM GRANULOCYTES # BLD AUTO: 0 X10E3/UL (ref 0–0.1)
IMM GRANULOCYTES NFR BLD AUTO: 0 %
IMP & REVIEW OF LAB RESULTS: NORMAL
LDLC SERPL CALC-MCNC: 59 MG/DL (ref 0–99)
LYMPHOCYTES # BLD AUTO: 1.8 X10E3/UL (ref 0.7–3.1)
LYMPHOCYTES NFR BLD AUTO: 25 %
MCH RBC QN AUTO: 31.3 PG (ref 26.6–33)
MCHC RBC AUTO-ENTMCNC: 33.6 G/DL (ref 31.5–35.7)
MCV RBC AUTO: 93 FL (ref 79–97)
MONOCYTES # BLD AUTO: 0.5 X10E3/UL (ref 0.1–0.9)
MONOCYTES NFR BLD AUTO: 7 %
NEUTROPHILS # BLD AUTO: 4.4 X10E3/UL (ref 1.4–7)
NEUTROPHILS NFR BLD AUTO: 64 %
PLATELET # BLD AUTO: 451 X10E3/UL (ref 150–450)
POTASSIUM SERPL-SCNC: 3.8 MMOL/L (ref 3.5–5.2)
PROT SERPL-MCNC: 7.2 G/DL (ref 6–8.5)
RBC # BLD AUTO: 3.99 X10E6/UL (ref 3.77–5.28)
SODIUM SERPL-SCNC: 142 MMOL/L (ref 134–144)
T4 FREE SERPL-MCNC: 0.81 NG/DL (ref 0.82–1.77)
TRIGL SERPL-MCNC: 64 MG/DL (ref 0–149)
TSH SERPL DL<=0.005 MIU/L-ACNC: 7.99 UIU/ML (ref 0.45–4.5)
VLDLC SERPL CALC-MCNC: 13 MG/DL (ref 5–40)
WBC # BLD AUTO: 7 X10E3/UL (ref 3.4–10.8)

## 2024-05-15 NOTE — TELEPHONE ENCOUNTER
----- Message from Prudence Arthur MA sent at 5/15/2024  2:18 PM EDT -----  Can you schedule VV for me to discuss thyroid. Am please thank you   ----- Message -----  From: Justin Tran MD  Sent: 5/15/2024   9:59 AM EDT  To: Prudence Arthur MA    Set up virtual to discuss labs/thyroid.

## 2024-06-17 ENCOUNTER — HOSPITAL ENCOUNTER (OUTPATIENT)
Facility: HOSPITAL | Age: 44
Discharge: HOME | End: 2024-06-20
Attending: FAMILY MEDICINE
Payer: MEDICAID

## 2024-06-17 DIAGNOSIS — Z12.31 ENCOUNTER FOR SCREENING MAMMOGRAM FOR MALIGNANT NEOPLASM OF BREAST: ICD-10-CM

## 2024-06-17 PROCEDURE — 77067 SCR MAMMO BI INCL CAD: CPT

## 2024-06-24 ENCOUNTER — TELEMEDICINE (OUTPATIENT)
Age: 44
End: 2024-06-24
Payer: MEDICAID

## 2024-06-24 DIAGNOSIS — Z98.84 HISTORY OF GASTRIC BYPASS: ICD-10-CM

## 2024-06-24 DIAGNOSIS — F41.9 ANXIETY AND DEPRESSION: Primary | ICD-10-CM

## 2024-06-24 DIAGNOSIS — F32.A ANXIETY AND DEPRESSION: Primary | ICD-10-CM

## 2024-06-24 DIAGNOSIS — E03.9 ACQUIRED HYPOTHYROIDISM: ICD-10-CM

## 2024-06-24 PROCEDURE — 99214 OFFICE O/P EST MOD 30 MIN: CPT | Performed by: FAMILY MEDICINE

## 2024-06-24 RX ORDER — ONDANSETRON 4 MG/1
4 TABLET, ORALLY DISINTEGRATING ORAL 3 TIMES DAILY PRN
Qty: 90 TABLET | Refills: 1 | Status: SHIPPED | OUTPATIENT
Start: 2024-06-24

## 2024-06-24 RX ORDER — LEVOTHYROXINE SODIUM 137 UG/1
137 TABLET ORAL DAILY
Qty: 90 TABLET | Refills: 0 | Status: SHIPPED | OUTPATIENT
Start: 2024-06-24

## 2024-06-24 RX ORDER — FLUOXETINE HYDROCHLORIDE 20 MG/1
20 CAPSULE ORAL DAILY
Qty: 90 CAPSULE | Refills: 1 | Status: SHIPPED | OUTPATIENT
Start: 2024-06-24

## 2024-06-24 ASSESSMENT — PATIENT HEALTH QUESTIONNAIRE - PHQ9
SUM OF ALL RESPONSES TO PHQ QUESTIONS 1-9: 9
1. LITTLE INTEREST OR PLEASURE IN DOING THINGS: SEVERAL DAYS
9. THOUGHTS THAT YOU WOULD BE BETTER OFF DEAD, OR OF HURTING YOURSELF: NOT AT ALL
SUM OF ALL RESPONSES TO PHQ QUESTIONS 1-9: 9
2. FEELING DOWN, DEPRESSED OR HOPELESS: SEVERAL DAYS
3. TROUBLE FALLING OR STAYING ASLEEP: SEVERAL DAYS
4. FEELING TIRED OR HAVING LITTLE ENERGY: NEARLY EVERY DAY
SUM OF ALL RESPONSES TO PHQ QUESTIONS 1-9: 9
SUM OF ALL RESPONSES TO PHQ9 QUESTIONS 1 & 2: 2
8. MOVING OR SPEAKING SO SLOWLY THAT OTHER PEOPLE COULD HAVE NOTICED. OR THE OPPOSITE, BEING SO FIGETY OR RESTLESS THAT YOU HAVE BEEN MOVING AROUND A LOT MORE THAN USUAL: SEVERAL DAYS
7. TROUBLE CONCENTRATING ON THINGS, SUCH AS READING THE NEWSPAPER OR WATCHING TELEVISION: SEVERAL DAYS
6. FEELING BAD ABOUT YOURSELF - OR THAT YOU ARE A FAILURE OR HAVE LET YOURSELF OR YOUR FAMILY DOWN: SEVERAL DAYS
10. IF YOU CHECKED OFF ANY PROBLEMS, HOW DIFFICULT HAVE THESE PROBLEMS MADE IT FOR YOU TO DO YOUR WORK, TAKE CARE OF THINGS AT HOME, OR GET ALONG WITH OTHER PEOPLE: SOMEWHAT DIFFICULT
SUM OF ALL RESPONSES TO PHQ QUESTIONS 1-9: 9
5. POOR APPETITE OR OVEREATING: NOT AT ALL

## 2024-06-24 NOTE — PROGRESS NOTES
television 1   Moving or speaking so slowly that other people could have noticed. Or the opposite - being so fidgety or restless that you have been moving around a lot more than usual 1   Thoughts that you would be better off dead, or of hurting yourself in some way 0   PHQ-2 Score 2   PHQ-9 Total Score 9   If you checked off any problems, how difficult have these problems made it for you to do your work, take care of things at home, or get along with other people? 1     She is asking to have zofran on hand.  Has a history of gastric bypass and since this time she has had recurrent nausea. Uses 1-2x/week.  Helps.    TSH noted to be elevated and Free T4 low.  Was on thyroid supplementation in the past, but has not had in the past ~8 months.  Willing to restart this today.    Reviewed PmHx, RxHx, FmHx, SocHx, AllgHx and updated and dated in the chart.      Objective:   There were no vitals filed for this visit.  Physical Examination:    Physical Exam  Nursing note reviewed.   Constitutional:       General: She is not in acute distress.     Appearance: Normal appearance.   HENT:      Head: Normocephalic.   Pulmonary:      Effort: Pulmonary effort is normal. No respiratory distress.   Musculoskeletal:      Cervical back: Normal range of motion.   Neurological:      Mental Status: She is alert and oriented to person, place, and time.   Psychiatric:         Mood and Affect: Mood normal.         Behavior: Behavior normal.         Assessment/ Plan:   Fernandez was seen today for follow-up, anxiety, depression, medication refill and nausea.    Diagnoses and all orders for this visit:    Anxiety and depression  -     FLUoxetine (PROZAC) 20 MG capsule; Take 1 capsule by mouth daily    History of gastric bypass  -     ondansetron (ZOFRAN-ODT) 4 MG disintegrating tablet; Take 1 tablet by mouth 3 times daily as needed for Nausea or Vomiting    Acquired hypothyroidism  -     levothyroxine (SYNTHROID) 137 MCG tablet; Take 1 tablet by

## 2024-06-24 NOTE — PROGRESS NOTES
Screening:  :          No data to display                 Coordination of Care Questionnaire:  :     \"Have you been to the ER, urgent care clinic since your last visit?  Hospitalized since your last visit?\"    NO    “Have you seen or consulted any other health care providers outside of [facility] since your last visit?”    NO    “Have you had a colorectal cancer screening such as a colonoscopy/FIT/Cologuard?    NO Appt scheduled     No colonoscopy on file  No cologuard on file  No FIT/FOBT on file   No flexible sigmoidoscopy on file         “Have you had a pap smear?”    NO    Date of last Cervical Cancer screen (HPV or PAP): 4/22/2015

## 2024-07-04 ENCOUNTER — APPOINTMENT (OUTPATIENT)
Facility: HOSPITAL | Age: 44
End: 2024-07-04
Payer: MEDICAID

## 2024-07-04 ENCOUNTER — HOSPITAL ENCOUNTER (EMERGENCY)
Facility: HOSPITAL | Age: 44
Discharge: HOME | End: 2024-07-04
Attending: STUDENT IN AN ORGANIZED HEALTH CARE EDUCATION/TRAINING PROGRAM
Payer: MEDICAID

## 2024-07-04 VITALS
DIASTOLIC BLOOD PRESSURE: 74 MMHG | RESPIRATION RATE: 16 BRPM | WEIGHT: 190 LBS | TEMPERATURE: 98.4 F | SYSTOLIC BLOOD PRESSURE: 116 MMHG | OXYGEN SATURATION: 100 % | BODY MASS INDEX: 28.79 KG/M2 | HEART RATE: 86 BPM | HEIGHT: 68 IN

## 2024-07-04 DIAGNOSIS — M25.561 ACUTE PAIN OF RIGHT KNEE: Primary | ICD-10-CM

## 2024-07-04 DIAGNOSIS — M17.11 OSTEOARTHRITIS OF RIGHT KNEE, UNSPECIFIED OSTEOARTHRITIS TYPE: ICD-10-CM

## 2024-07-04 PROCEDURE — 6370000000 HC RX 637 (ALT 250 FOR IP): Performed by: STUDENT IN AN ORGANIZED HEALTH CARE EDUCATION/TRAINING PROGRAM

## 2024-07-04 PROCEDURE — 6360000002 HC RX W HCPCS: Performed by: STUDENT IN AN ORGANIZED HEALTH CARE EDUCATION/TRAINING PROGRAM

## 2024-07-04 PROCEDURE — 73562 X-RAY EXAM OF KNEE 3: CPT

## 2024-07-04 PROCEDURE — 99284 EMERGENCY DEPT VISIT MOD MDM: CPT

## 2024-07-04 PROCEDURE — 96372 THER/PROPH/DIAG INJ SC/IM: CPT

## 2024-07-04 RX ORDER — DIAZEPAM 5 MG/1
5 TABLET ORAL ONCE
Status: COMPLETED | OUTPATIENT
Start: 2024-07-04 | End: 2024-07-04

## 2024-07-04 RX ORDER — KETOROLAC TROMETHAMINE 30 MG/ML
30 INJECTION, SOLUTION INTRAMUSCULAR; INTRAVENOUS ONCE
Status: COMPLETED | OUTPATIENT
Start: 2024-07-04 | End: 2024-07-04

## 2024-07-04 RX ORDER — METHYLPREDNISOLONE 4 MG/1
TABLET ORAL
Qty: 1 KIT | Refills: 0 | Status: SHIPPED | OUTPATIENT
Start: 2024-07-04 | End: 2024-07-10

## 2024-07-04 RX ORDER — CYCLOBENZAPRINE HCL 10 MG
10 TABLET ORAL 3 TIMES DAILY PRN
Qty: 21 TABLET | Refills: 0 | Status: SHIPPED | OUTPATIENT
Start: 2024-07-04 | End: 2024-07-14

## 2024-07-04 RX ADMIN — DIAZEPAM 5 MG: 5 TABLET ORAL at 22:22

## 2024-07-04 RX ADMIN — KETOROLAC TROMETHAMINE 30 MG: 30 INJECTION, SOLUTION INTRAMUSCULAR at 22:23

## 2024-07-04 ASSESSMENT — PAIN SCALES - GENERAL: PAINLEVEL_OUTOF10: 7

## 2024-07-04 ASSESSMENT — PAIN DESCRIPTION - FREQUENCY: FREQUENCY: CONTINUOUS

## 2024-07-04 ASSESSMENT — PAIN DESCRIPTION - LOCATION: LOCATION: KNEE

## 2024-07-04 ASSESSMENT — PAIN DESCRIPTION - ONSET: ONSET: SUDDEN

## 2024-07-04 ASSESSMENT — LIFESTYLE VARIABLES
HOW MANY STANDARD DRINKS CONTAINING ALCOHOL DO YOU HAVE ON A TYPICAL DAY: PATIENT DOES NOT DRINK
HOW OFTEN DO YOU HAVE A DRINK CONTAINING ALCOHOL: NEVER

## 2024-07-04 ASSESSMENT — PAIN - FUNCTIONAL ASSESSMENT: PAIN_FUNCTIONAL_ASSESSMENT: PREVENTS OR INTERFERES SOME ACTIVE ACTIVITIES AND ADLS

## 2024-07-04 ASSESSMENT — PAIN DESCRIPTION - DESCRIPTORS: DESCRIPTORS: ACHING

## 2024-07-04 ASSESSMENT — PAIN DESCRIPTION - PAIN TYPE: TYPE: ACUTE PAIN

## 2024-07-04 ASSESSMENT — PAIN DESCRIPTION - ORIENTATION: ORIENTATION: RIGHT

## 2024-07-05 NOTE — ED PROVIDER NOTES
[facility] EMERGENCY DEPT  EMERGENCY DEPARTMENT ENCOUNTER      Pt Name: Fernandez Paz  MRN: [id number]  Birthdate 1980  Date of evaluation: 7/4/2024  Provider: Phoebe Roche MD    CHIEF COMPLAINT       Chief Complaint   Patient presents with    Knee Pain     right     HISTORY OF PRESENT ILLNESS   (Location/Symptom, Timing/Onset, Context/Setting, Quality, Duration, Modifying Factors, Severity)  Note limiting factors.   43-year-old female with past medical history of bilateral knee osteoarthritis, chronic knee pain, presents to the ER for evaluation of severe right knee pain.  Patient reports she normally does not exercise much, and earlier today did a bit more activity than usual by swimming in her neighbor's pool. Unsure if she could have exacerbated her underlying arthritis due to this. States initially felt fine after swimming, but after waking up from a nap, experienced severe right knee pain that she localizes to both the medial and lateral aspects of the joint. States having difficulty putting weight on the RLE due to knee pain, and had to borrow a rolling walker to help with ambulation as a result. She has not taken any medications for pain PTA.         Review of External Medical Records:     Nursing Notes were reviewed.    REVIEW OF SYSTEMS    (2-9 systems for level 4, 10 or more for level 5)     Review of Systems   All other systems reviewed and are negative.      Except as noted above the remainder of the review of systems was reviewed and negative.       PAST MEDICAL HISTORY     Past Medical History:   Diagnosis Date    Adverse effect of anesthesia     WOKE UP DURING ENDOSCOPY WITH KETAMINE AND PATIENT REPORTS FEELING LIKE SHE WAS HALLUCINATING - BUT DID OK WITH PROPOFOL IN PAST WITH NO ISSUES PER PATIENT    Arthritis     BILATERAL KNEES- OA    Asthma     related to seasonal allergies    Chronic pain     KNEES    GERD (gastroesophageal reflux disease)     Hiatal hernia     Hypothyroid     Morbid obesity

## 2024-07-05 NOTE — ED TRIAGE NOTES
Pt ambulated to the treatment area with a limp using a rolling walker.Pt states \"I have a history of problems with both knees. And I was at my neighbors house today swimming I did not injure it that I know of I went home around 4pm I laid down for a few hours and when I got up I could not put weight on my right leg because of pain in the knee.\"

## 2024-08-21 ENCOUNTER — LAB (OUTPATIENT)
Age: 44
End: 2024-08-21

## 2024-08-21 DIAGNOSIS — E03.9 ACQUIRED HYPOTHYROIDISM: ICD-10-CM

## 2024-08-22 LAB — TSH SERPL DL<=0.005 MIU/L-ACNC: 4.8 UIU/ML (ref 0.45–4.5)

## 2024-08-26 ENCOUNTER — TELEMEDICINE (OUTPATIENT)
Age: 44
End: 2024-08-26
Payer: MEDICAID

## 2024-08-26 DIAGNOSIS — E07.9 THYROID DISORDER: Primary | ICD-10-CM

## 2024-08-26 PROCEDURE — 99213 OFFICE O/P EST LOW 20 MIN: CPT | Performed by: FAMILY MEDICINE

## 2024-08-26 RX ORDER — LEVOTHYROXINE SODIUM 150 UG/1
150 TABLET ORAL DAILY
Qty: 90 TABLET | Refills: 0 | Status: SHIPPED | OUTPATIENT
Start: 2024-08-26

## 2024-08-26 NOTE — PROGRESS NOTES
[address] [place] [address]  Phone: [phone]  Fax: [phone]      Fernandez Paz, was evaluated through a synchronous (real-time) audio-video encounter. The patient (or guardian if applicable) is aware that this is a billable service, which includes applicable co-pays. This Virtual Visit was conducted with patient's (and/or legal guardian's) consent. Patient identification was verified, and a caregiver was present when appropriate.   The patient was located at Home: [address] [place] [id number]  Provider was located at Facility (Appt Dept): [address] [place] [id number]  Confirm you are appropriately licensed, registered, or certified to deliver care in the state where the patient is located as indicated above. If you are not or unsure, please re-schedule the visit: Yes, I confirm.       Chief Complaint   Patient presents with    Thyroid Problem     Follow up.     She is a 43 y.o. female who presents for virtual thyroid follow up.  She was started back on levothyroxine at her 6/24 visit.  She reports compliance with the medication.  Denies hypo/hyperthyroid sx.  Had labs checked last week and noted to still be in hypothyroid state, but improved from her last check.    Reviewed PmHx, RxHx, FmHx, SocHx, AllgHx and updated and dated in the chart.      Objective:   There were no vitals filed for this visit.  Physical Examination:    Physical Exam  Nursing note reviewed.   Constitutional:       General: She is not in acute distress.     Appearance: Normal appearance.   HENT:      Head: Normocephalic.   Pulmonary:      Effort: Pulmonary effort is normal. No respiratory distress.   Musculoskeletal:      Cervical back: Normal range of motion.   Neurological:      Mental Status: She is alert and oriented to person, place, and time.   Psychiatric:         Mood and Affect: Mood normal.         Behavior: Behavior normal.          Assessment/ Plan:   Fernandez was seen today for thyroid problem.    Diagnoses and all orders for this visit:    Thyroid disorder  -     levothyroxine (SYNTHROID) 150 MCG tablet; Take 1 tablet by mouth daily  -     TSH; Future         All of the above diagnoses have the status of \"stable\" unless described below.  The plan for all above diagnoses is for the patient to return as documented below for continued follow up.  More specific details regarding the plan for certain diagnoses immediately follows.    Will increase synthroid to 150mcg daily.  She will return for labs in 2 months.    Return in about 2 months (around 10/26/2024) for Labs.     I have discussed the diagnosis with the patient and the intended treatment plan as seen in the above orders. The patient has received an after-visit summary and questions were answered concerning future plans. Asked to return should symptoms worsen or not improve with treatment. Any pending labs and studies will be relayed to patient when they become available.     Pt verbalizes understanding of plan of care and denies further questions or concerns at this time.    Total time spent for this encounter: Not billed by time    Justin Tran MD, FAAFP

## 2024-09-02 ENCOUNTER — HOSPITAL ENCOUNTER (EMERGENCY)
Facility: HOSPITAL | Age: 44
Discharge: HOME | End: 2024-09-03
Attending: EMERGENCY MEDICINE
Payer: MEDICAID

## 2024-09-02 ENCOUNTER — APPOINTMENT (OUTPATIENT)
Facility: HOSPITAL | Age: 44
End: 2024-09-02
Payer: MEDICAID

## 2024-09-02 VITALS
BODY MASS INDEX: 30.31 KG/M2 | HEART RATE: 87 BPM | SYSTOLIC BLOOD PRESSURE: 122 MMHG | WEIGHT: 200 LBS | TEMPERATURE: 98.1 F | RESPIRATION RATE: 18 BRPM | OXYGEN SATURATION: 99 % | HEIGHT: 68 IN | DIASTOLIC BLOOD PRESSURE: 78 MMHG

## 2024-09-02 DIAGNOSIS — S22.32XA CLOSED FRACTURE OF ONE RIB OF LEFT SIDE, INITIAL ENCOUNTER: Primary | ICD-10-CM

## 2024-09-02 PROCEDURE — 99283 EMERGENCY DEPT VISIT LOW MDM: CPT

## 2024-09-02 PROCEDURE — 6370000000 HC RX 637 (ALT 250 FOR IP): Performed by: EMERGENCY MEDICINE

## 2024-09-02 PROCEDURE — 71101 X-RAY EXAM UNILAT RIBS/CHEST: CPT

## 2024-09-02 RX ORDER — HYDROCODONE BITARTRATE AND ACETAMINOPHEN 5; 325 MG/1; MG/1
1 TABLET ORAL EVERY 6 HOURS PRN
Qty: 12 TABLET | Refills: 0 | Status: SHIPPED | OUTPATIENT
Start: 2024-09-02 | End: 2024-09-03

## 2024-09-02 RX ORDER — OXYCODONE AND ACETAMINOPHEN 5; 325 MG/1; MG/1
1 TABLET ORAL
Status: COMPLETED | OUTPATIENT
Start: 2024-09-02 | End: 2024-09-02

## 2024-09-02 RX ORDER — LIDOCAINE 4 G/G
1 PATCH TOPICAL DAILY
Qty: 15 EACH | Refills: 0 | Status: SHIPPED | OUTPATIENT
Start: 2024-09-02 | End: 2024-09-03

## 2024-09-02 RX ORDER — LIDOCAINE 4 G/G
1 PATCH TOPICAL DAILY
Status: DISCONTINUED | OUTPATIENT
Start: 2024-09-03 | End: 2024-09-03 | Stop reason: HOSPADM

## 2024-09-02 RX ORDER — ONDANSETRON 4 MG/1
4 TABLET, ORALLY DISINTEGRATING ORAL ONCE
Status: COMPLETED | OUTPATIENT
Start: 2024-09-02 | End: 2024-09-02

## 2024-09-02 RX ORDER — ACETAMINOPHEN 325 MG/1
650 TABLET ORAL
Status: COMPLETED | OUTPATIENT
Start: 2024-09-02 | End: 2024-09-02

## 2024-09-02 RX ADMIN — ACETAMINOPHEN 650 MG: 325 TABLET ORAL at 23:03

## 2024-09-02 RX ADMIN — OXYCODONE HYDROCHLORIDE AND ACETAMINOPHEN 1 TABLET: 5; 325 TABLET ORAL at 23:03

## 2024-09-02 RX ADMIN — ONDANSETRON 4 MG: 4 TABLET, ORALLY DISINTEGRATING ORAL at 23:03

## 2024-09-02 ASSESSMENT — PAIN SCALES - GENERAL: PAINLEVEL_OUTOF10: 8

## 2024-09-02 ASSESSMENT — PAIN DESCRIPTION - DESCRIPTORS: DESCRIPTORS: SHARP;SHOOTING

## 2024-09-02 ASSESSMENT — PAIN - FUNCTIONAL ASSESSMENT: PAIN_FUNCTIONAL_ASSESSMENT: 0-10

## 2024-09-02 ASSESSMENT — PAIN DESCRIPTION - LOCATION: LOCATION: RIB CAGE

## 2024-09-02 ASSESSMENT — PAIN DESCRIPTION - ORIENTATION: ORIENTATION: LEFT

## 2024-09-02 ASSESSMENT — ENCOUNTER SYMPTOMS: NAUSEA: 0

## 2024-09-03 RX ORDER — LIDOCAINE 50 MG/G
1 PATCH TOPICAL DAILY
Qty: 10 PATCH | Refills: 0 | Status: SHIPPED | OUTPATIENT
Start: 2024-09-03 | End: 2024-09-03

## 2024-09-03 RX ORDER — LIDOCAINE 4 G/G
1 PATCH TOPICAL DAILY
Qty: 15 EACH | Refills: 0 | Status: SHIPPED | OUTPATIENT
Start: 2024-09-03 | End: 2024-09-03

## 2024-09-03 RX ORDER — HYDROCODONE BITARTRATE AND ACETAMINOPHEN 5; 325 MG/1; MG/1
1 TABLET ORAL EVERY 6 HOURS PRN
Qty: 12 TABLET | Refills: 0 | Status: SHIPPED | OUTPATIENT
Start: 2024-09-03 | End: 2024-09-03

## 2024-09-03 RX ORDER — LIDOCAINE 50 MG/G
1 PATCH TOPICAL DAILY
Qty: 10 PATCH | Refills: 0 | Status: SHIPPED | OUTPATIENT
Start: 2024-09-03 | End: 2024-09-13

## 2024-09-03 RX ORDER — HYDROCODONE BITARTRATE AND ACETAMINOPHEN 5; 325 MG/1; MG/1
1 TABLET ORAL EVERY 6 HOURS PRN
Qty: 12 TABLET | Refills: 0 | Status: SHIPPED | OUTPATIENT
Start: 2024-09-03 | End: 2024-09-06

## 2024-09-03 NOTE — ED NOTES
Patient called. [facility] does not have norco 5/325. Sent Rx to the [facility].     Narinder Chery DO  09/03/24 0980

## 2024-09-03 NOTE — ED TRIAGE NOTES
Patient ambulatory to Triage with c/o left sided rib pain after [place]    Patient reports that the pain radiates into her chest    Denies taking anything for pain

## 2024-09-03 NOTE — ED PROVIDER NOTES
[facility] EMERGENCY DEPT  EMERGENCY DEPARTMENT ENCOUNTER      Pt Name: Fernandez Paz  MRN: [id number]  Birthdate 1980  Date of evaluation: 9/2/2024  Provider: Susan Medley MD    CHIEF COMPLAINT       Chief Complaint   Patient presents with    Rib Pain (injury)         HISTORY OF PRESENT ILLNESS   (Location/Symptom, Timing/Onset, Context/Setting, Quality, Duration, Modifying Factors, Severity)  Note limiting factors.   The history is provided by the patient.   Fall  The accident occurred Yesterday. The fall occurred while recreating/playing. Impact surface: river bed. There was no blood loss. Point of impact: left chest wall, right temple. Pain location: left rib. The pain is severe. She was Ambulatory at the scene. Pertinent negatives include no nausea, no hematuria and no loss of consciousness. The symptoms are aggravated by pressure on the injury and activity (deep inspiration). She has tried acetaminophen for the symptoms. The treatment provided no relief.         Review of External Medical Records:     Nursing Notes were reviewed.    REVIEW OF SYSTEMS    (2-9 systems for level 4, 10 or more for level 5)     Review of Systems   Gastrointestinal:  Negative for nausea.   Genitourinary:  Negative for hematuria.   Neurological:  Negative for loss of consciousness.       Except as noted above the remainder of the review of systems was reviewed and negative.       PAST MEDICAL HISTORY     Past Medical History:   Diagnosis Date    Adverse effect of anesthesia     WOKE UP DURING ENDOSCOPY WITH KETAMINE AND PATIENT REPORTS FEELING LIKE SHE WAS HALLUCINATING - BUT DID OK WITH PROPOFOL IN PAST WITH NO ISSUES PER PATIENT    Arthritis     BILATERAL KNEES- OA    Asthma     related to seasonal allergies    Chronic pain     KNEES    GERD (gastroesophageal reflux disease)     Hiatal hernia     Hypothyroid     Morbid obesity (HCC)     Psychiatric disorder     anxeity    PUD (peptic ulcer disease)     PRE ULCER -    Second

## 2024-09-06 ENCOUNTER — TELEPHONE (OUTPATIENT)
Age: 44
End: 2024-09-06

## 2024-09-10 ENCOUNTER — TELEMEDICINE (OUTPATIENT)
Age: 44
End: 2024-09-10
Payer: MEDICAID

## 2024-09-10 DIAGNOSIS — S22.32XD CLOSED FRACTURE OF ONE RIB OF LEFT SIDE WITH ROUTINE HEALING, SUBSEQUENT ENCOUNTER: Primary | ICD-10-CM

## 2024-09-10 PROCEDURE — 99213 OFFICE O/P EST LOW 20 MIN: CPT | Performed by: FAMILY MEDICINE

## 2024-09-10 RX ORDER — HYDROCODONE BITARTRATE AND ACETAMINOPHEN 5; 325 MG/1; MG/1
1 TABLET ORAL EVERY 6 HOURS PRN
Qty: 12 TABLET | Refills: 0 | Status: SHIPPED | OUTPATIENT
Start: 2024-09-10 | End: 2024-09-13

## 2024-09-10 ASSESSMENT — PATIENT HEALTH QUESTIONNAIRE - PHQ9
SUM OF ALL RESPONSES TO PHQ QUESTIONS 1-9: 0
2. FEELING DOWN, DEPRESSED OR HOPELESS: NOT AT ALL
SUM OF ALL RESPONSES TO PHQ QUESTIONS 1-9: 0
SUM OF ALL RESPONSES TO PHQ9 QUESTIONS 1 & 2: 0
1. LITTLE INTEREST OR PLEASURE IN DOING THINGS: NOT AT ALL

## 2024-11-06 ENCOUNTER — TELEMEDICINE (OUTPATIENT)
Age: 44
End: 2024-11-06
Payer: MEDICAID

## 2024-11-06 DIAGNOSIS — F41.9 ANXIETY AND DEPRESSION: Primary | ICD-10-CM

## 2024-11-06 DIAGNOSIS — F32.A ANXIETY AND DEPRESSION: Primary | ICD-10-CM

## 2024-11-06 DIAGNOSIS — F17.210 CIGARETTE SMOKER: ICD-10-CM

## 2024-11-06 DIAGNOSIS — F32.A ANXIETY AND DEPRESSION: ICD-10-CM

## 2024-11-06 DIAGNOSIS — F41.9 ANXIETY AND DEPRESSION: ICD-10-CM

## 2024-11-06 PROCEDURE — 99214 OFFICE O/P EST MOD 30 MIN: CPT | Performed by: FAMILY MEDICINE

## 2024-11-06 RX ORDER — BUPROPION HYDROCHLORIDE 150 MG/1
150 TABLET ORAL EVERY MORNING
Qty: 30 TABLET | Refills: 2 | Status: SHIPPED | OUTPATIENT
Start: 2024-11-06

## 2024-11-06 ASSESSMENT — PATIENT HEALTH QUESTIONNAIRE - PHQ9
5. POOR APPETITE OR OVEREATING: NOT AT ALL
1. LITTLE INTEREST OR PLEASURE IN DOING THINGS: NOT AT ALL
SUM OF ALL RESPONSES TO PHQ QUESTIONS 1-9: 0
4. FEELING TIRED OR HAVING LITTLE ENERGY: NOT AT ALL
SUM OF ALL RESPONSES TO PHQ QUESTIONS 1-9: 0
2. FEELING DOWN, DEPRESSED OR HOPELESS: NOT AT ALL
SUM OF ALL RESPONSES TO PHQ9 QUESTIONS 1 & 2: 0
8. MOVING OR SPEAKING SO SLOWLY THAT OTHER PEOPLE COULD HAVE NOTICED. OR THE OPPOSITE, BEING SO FIGETY OR RESTLESS THAT YOU HAVE BEEN MOVING AROUND A LOT MORE THAN USUAL: NOT AT ALL
10. IF YOU CHECKED OFF ANY PROBLEMS, HOW DIFFICULT HAVE THESE PROBLEMS MADE IT FOR YOU TO DO YOUR WORK, TAKE CARE OF THINGS AT HOME, OR GET ALONG WITH OTHER PEOPLE: NOT DIFFICULT AT ALL
SUM OF ALL RESPONSES TO PHQ QUESTIONS 1-9: 0
SUM OF ALL RESPONSES TO PHQ QUESTIONS 1-9: 0
7. TROUBLE CONCENTRATING ON THINGS, SUCH AS READING THE NEWSPAPER OR WATCHING TELEVISION: NOT AT ALL
6. FEELING BAD ABOUT YOURSELF - OR THAT YOU ARE A FAILURE OR HAVE LET YOURSELF OR YOUR FAMILY DOWN: NOT AT ALL
9. THOUGHTS THAT YOU WOULD BE BETTER OFF DEAD, OR OF HURTING YOURSELF: NOT AT ALL
3. TROUBLE FALLING OR STAYING ASLEEP: NOT AT ALL

## 2024-11-06 NOTE — PROGRESS NOTES
Identified pt with two pt identifiers(name and )    Chief Complaint   Patient presents with    Discuss Medications        Health Maintenance Due   Topic    Pneumococcal 0-64 years Vaccine (1 of 2 - PCV)    Hepatitis B vaccine (1 of 3 - 19+ 3-dose series)    Cervical cancer screen     Colorectal Cancer Screen     Flu vaccine (1)    COVID-19 Vaccine ( season)       Wt Readings from Last 3 Encounters:   24 90.7 kg (200 lb)   24 86.2 kg (190 lb)   24 87.7 kg (193 lb 6.4 oz)     Temp Readings from Last 3 Encounters:   24 98.1 °F (36.7 °C) (Oral)   24 98.4 °F (36.9 °C) (Oral)   24 97.8 °F (36.6 °C) (Temporal)     BP Readings from Last 3 Encounters:   24 122/78   24 116/74   24 112/78     Pulse Readings from Last 3 Encounters:   24 87   24 86   24 88           Depression Screening:  :         2024     9:54 AM 9/10/2024    12:55 PM 2024    10:28 AM 2024     3:44 PM   PHQ-9 Questionaire   Little interest or pleasure in doing things 0 0 1 1   Feeling down, depressed, or hopeless 0 0 1 1   Trouble falling or staying asleep, or sleeping too much 0  1 0   Feeling tired or having little energy 0  3 1   Poor appetite or overeating 0  0 0   Feeling bad about yourself - or that you are a failure or have let yourself or your family down 0  1 2   Trouble concentrating on things, such as reading the newspaper or watching television 0  1 1   Moving or speaking so slowly that other people could have noticed. Or the opposite - being so fidgety or restless that you have been moving around a lot more than usual 0  1 0   Thoughts that you would be better off dead, or of hurting yourself in some way 0  0 0   PHQ-9 Total Score 0 0 9 6   If you checked off any problems, how difficult have these problems made it for you to do your work, take care of things at home, or get along with other people? 0  1 1        Fall Risk Assessment:  :          No

## 2024-11-06 NOTE — PROGRESS NOTES
[facility]  [address] [place] [address]  Phone: [phone]  Fax: [phone]      Fenrandez Paz, was evaluated through a synchronous (real-time) audio-video encounter. The patient (or guardian if applicable) is aware that this is a billable service, which includes applicable co-pays. This Virtual Visit was conducted with patient's (and/or legal guardian's) consent. Patient identification was verified, and a caregiver was present when appropriate.   The patient was located at Home: [address] [facility] [phone]  Provider was located at Home (Appt Dept State): [place]  Confirm you are appropriately licensed, registered, or certified to deliver care in the state where the patient is located as indicated above. If you are not or unsure, please re-schedule the visit: Yes, I confirm.       Chief Complaint   Patient presents with    Discuss Medications     She is a 44 y.o. female who presents for virtual follow up visit.  She would like to switch off of the fluoxetine.  Says that she came off of this a few weeks ago as she did not like the way it made her feel.  Says that it did not feel like it was helping with her depression/anxiety.  Reported that she was taking more of her hydroxyzine.  She was consistently taking this from 5/2024.  We never bumped up the dose from 20mg.  Asking about Wellbutrin as she has had a friend that had good success with this for depression and quitting smoking.    There is no SI/HI.    Reviewed PmHx, RxHx, FmHx, SocHx, AllgHx and updated and dated in the chart.      Objective:   There were no vitals filed for this visit.  Physical Examination:    Physical Exam  Nursing note reviewed.   Constitutional:       General: She is not in acute distress.     Appearance: Normal appearance.   HENT:      Head: Normocephalic.   Pulmonary:      Effort: Pulmonary effort is normal. No respiratory distress.   Musculoskeletal:      Cervical back: Normal

## 2024-11-07 RX ORDER — HYDROXYZINE HYDROCHLORIDE 50 MG/1
50 TABLET, FILM COATED ORAL EVERY 6 HOURS PRN
Qty: 90 TABLET | Refills: 0 | Status: SHIPPED | OUTPATIENT
Start: 2024-11-07

## 2024-12-13 ENCOUNTER — OFFICE VISIT (OUTPATIENT)
Age: 44
End: 2024-12-13

## 2024-12-13 VITALS
BODY MASS INDEX: 28.07 KG/M2 | WEIGHT: 185.2 LBS | OXYGEN SATURATION: 96 % | SYSTOLIC BLOOD PRESSURE: 116 MMHG | DIASTOLIC BLOOD PRESSURE: 80 MMHG | HEART RATE: 83 BPM | RESPIRATION RATE: 17 BRPM | TEMPERATURE: 98.2 F | HEIGHT: 68 IN

## 2024-12-13 DIAGNOSIS — J20.9 ACUTE BRONCHITIS, UNSPECIFIED ORGANISM: Primary | ICD-10-CM

## 2024-12-13 DIAGNOSIS — J02.9 SORE THROAT: ICD-10-CM

## 2024-12-13 LAB
INFLUENZA A ANTIGEN, POC: NEGATIVE
INFLUENZA B ANTIGEN, POC: NEGATIVE
Lab: NORMAL
PERFORMING INSTRUMENT: NORMAL
QC PASS/FAIL: NORMAL
S PYO AG THROAT QL: NORMAL
SARS-COV-2, POC: NORMAL

## 2024-12-13 RX ORDER — PREDNISONE 20 MG/1
20 TABLET ORAL 2 TIMES DAILY
Qty: 10 TABLET | Refills: 0 | Status: SHIPPED | OUTPATIENT
Start: 2024-12-13 | End: 2024-12-18

## 2024-12-13 NOTE — PATIENT INSTRUCTIONS
Thank you for visiting [facility] today    Acute bronchitis in setting of asthma/reactive airway disease -  Your strep, COVID, and influenza testing was negative in clinic today  Throat culture sent, we will call with results  Continue your albuterol as needed  Add prednisone, twice daily for 5 days  Recommend over-the-counter expectorant (guaifenesin)  Please see below for further symptomatic recommendations:  If any significant worsening go to the nearest emergency room  Call or return to clinic if any concerns    Nasal Congestion:  Flonase or Nasonex (over the counter) nasal spray, once a day  Normal saline nasal spray  Afrin nasal spray no longer than three days  If you have high blood pressure, take Coricidin HBP (or the generic form) instead. Follow instructions on the box  Cough:  Throat lozenges, hot tea, and honey may help  Vicks VapoRub at night to help with cough and relieve muscles aches and pain  If not prescribed a cough medication, Robitussin DM is an option. It is an over the counter cough medication containing dextromethorphan to help suppress cough at night   *Please only take when absolutely needed, as this is a controlled substance that can cause addiction   *Please only take cough syrup at nighttime as it causes drowsiness   *Do not drive or operate any machinery while taking this medication  Chest Congestion:  For thick mucus, take Mucinex (with Guafenesin only) to help thin the mucus. Follow instructions on the box. You will need to drink plenty of water with this medication  Sore Throat:  Lozenges, as needed. Cepacol lozenges will help numb the throat  Chloraseptic spray also helps to numb throat pain  Salt water gargles (1/4 tsp salt in 8 oz of water) to soothe throat pain  Sinus pain/pressure:  Warm, wet towel on face to help with facial sinus pain/pressure  Headache/Pain Fever/Body Aches:  If you can take NSAIDs, take Ibuprofen 400-600 mg every 8 hours as needed  If you cannot

## 2024-12-13 NOTE — PROGRESS NOTES
Fernandez Paz (:  1980) is a 44 y.o. female,Established patient, here for evaluation of the following chief complaint(s):  Congestion (Pt c/o congestion, sx started yesterday. ), Pharyngitis (Pt c/o sore throat, sx started yesterday. ), and Other (Pt c/o lings feeling like they are burning, sx started yesterday. )        SUBJECTIVE/OBJECTIVE:    History provided by:  Patient  Pharyngitis       44 y.o. female presents with symptoms of sore throat.  Woke yesterday morning with sore throat, \"feels like my throat is on fire,\" also coughing.  Admits to sneezing, runny nose, nasal congestion.  No fever or chills.  Cough productive of pale yellow mucus.    Has history of seasonal asthma.  Admits to shortness of breath.  When lays down has a rattling in the chest and has to cough it up.    Smoker, has not smoked since yesterday.  Smokes 5 cigarettes a day.    Daughter had croup last week.  Exposure to COVID at work.    Her biggest concern is her throat.         Vitals:    24 1013   BP: 116/80   Site: Right Upper Arm   Position: Sitting   Cuff Size: Medium Adult   Pulse: 83   Resp: 17   Temp: 98.2 °F (36.8 °C)   TempSrc: Oral   SpO2: 96%   Weight: 84 kg (185 lb 3.2 oz)   Height: 1.727 m (5' 8\")       Results for orders placed or performed in visit on 24   POCT rapid strep A   Result Value Ref Range    Strep A Ag None Detected None Detected   POCT Influenza A/B Antigen   Result Value Ref Range    Inflenza A Ag negative     Influenza B Ag negative    POCT COVID-19, Antigen   Result Value Ref Range    SARS-COV-2, POC Not-Detected Not Detected    Lot Number [id number]     QC Pass/Fail pass     Performing Instrument BinaxNOW         Physical Exam  Constitutional:       General: She is not in acute distress.     Appearance: Normal appearance. She is not ill-appearing or toxic-appearing.   HENT:      Head: Normocephalic and atraumatic.      Right Ear: Tympanic membrane, ear canal and external ear normal.

## 2024-12-15 LAB
BACTERIA SPEC CULT: NORMAL
SERVICE CMNT-IMP: NORMAL

## 2024-12-16 LAB
BACTERIA SPEC CULT: NORMAL
SERVICE CMNT-IMP: NORMAL

## 2025-01-17 ENCOUNTER — COMMUNITY OUTREACH (OUTPATIENT)
Age: 45
End: 2025-01-17

## 2025-01-17 ENCOUNTER — TELEPHONE (OUTPATIENT)
Age: 45
End: 2025-01-17

## 2025-01-17 NOTE — TELEPHONE ENCOUNTER
Pt called to ask for a prescription of Zolpidem. Pt said Dr. Tran hasn't prescribed it for her before but her previous [place] pcp doctor did in 2023. Can pt get a Zolpidem script sent to [id number] - [place], [place] - [address] [place] [address] - P [phone] - F [phone] [[id number]]

## 2025-01-17 NOTE — PROGRESS NOTES
Patient's HM shows they are overdue for Colorectal Screening.   Care Everywhere and  files searched.  No results to attach to order nor HM updated.     Progress notes indicate appt scheduled, no additional info found.

## 2025-01-20 ENCOUNTER — TELEMEDICINE (OUTPATIENT)
Age: 45
End: 2025-01-20
Payer: MEDICAID

## 2025-01-20 DIAGNOSIS — F41.9 ANXIETY AND DEPRESSION: ICD-10-CM

## 2025-01-20 DIAGNOSIS — J45.20 MILD INTERMITTENT ASTHMA WITHOUT COMPLICATION: ICD-10-CM

## 2025-01-20 DIAGNOSIS — F17.210 CIGARETTE SMOKER: ICD-10-CM

## 2025-01-20 DIAGNOSIS — E03.9 ACQUIRED HYPOTHYROIDISM: ICD-10-CM

## 2025-01-20 DIAGNOSIS — F51.05 INSOMNIA DUE TO MENTAL CONDITION: Primary | ICD-10-CM

## 2025-01-20 DIAGNOSIS — F32.A ANXIETY AND DEPRESSION: ICD-10-CM

## 2025-01-20 PROCEDURE — 99214 OFFICE O/P EST MOD 30 MIN: CPT | Performed by: FAMILY MEDICINE

## 2025-01-20 RX ORDER — TRAZODONE HYDROCHLORIDE 50 MG/1
50 TABLET, FILM COATED ORAL NIGHTLY
Qty: 30 TABLET | Refills: 2 | Status: SHIPPED | OUTPATIENT
Start: 2025-01-20

## 2025-01-20 RX ORDER — ALBUTEROL SULFATE 90 UG/1
2 INHALANT RESPIRATORY (INHALATION) EVERY 4 HOURS PRN
Qty: 3 EACH | Refills: 1 | Status: SHIPPED | OUTPATIENT
Start: 2025-01-20

## 2025-01-20 RX ORDER — ALBUTEROL SULFATE 0.83 MG/ML
2.5 SOLUTION RESPIRATORY (INHALATION) EVERY 4 HOURS PRN
Qty: 120 EACH | Refills: 2 | Status: SHIPPED | OUTPATIENT
Start: 2025-01-20

## 2025-01-20 SDOH — ECONOMIC STABILITY: FOOD INSECURITY: WITHIN THE PAST 12 MONTHS, YOU WORRIED THAT YOUR FOOD WOULD RUN OUT BEFORE YOU GOT MONEY TO BUY MORE.: NEVER TRUE

## 2025-01-20 ASSESSMENT — PATIENT HEALTH QUESTIONNAIRE - PHQ9
1. LITTLE INTEREST OR PLEASURE IN DOING THINGS: NOT AT ALL
5. POOR APPETITE OR OVEREATING: NOT AT ALL
4. FEELING TIRED OR HAVING LITTLE ENERGY: NOT AT ALL
SUM OF ALL RESPONSES TO PHQ QUESTIONS 1-9: 0
3. TROUBLE FALLING OR STAYING ASLEEP: NOT AT ALL
9. THOUGHTS THAT YOU WOULD BE BETTER OFF DEAD, OR OF HURTING YOURSELF: NOT AT ALL
6. FEELING BAD ABOUT YOURSELF - OR THAT YOU ARE A FAILURE OR HAVE LET YOURSELF OR YOUR FAMILY DOWN: NOT AT ALL
SUM OF ALL RESPONSES TO PHQ QUESTIONS 1-9: 0
SUM OF ALL RESPONSES TO PHQ9 QUESTIONS 1 & 2: 0
2. FEELING DOWN, DEPRESSED OR HOPELESS: NOT AT ALL
10. IF YOU CHECKED OFF ANY PROBLEMS, HOW DIFFICULT HAVE THESE PROBLEMS MADE IT FOR YOU TO DO YOUR WORK, TAKE CARE OF THINGS AT HOME, OR GET ALONG WITH OTHER PEOPLE: NOT DIFFICULT AT ALL
SUM OF ALL RESPONSES TO PHQ QUESTIONS 1-9: 0
8. MOVING OR SPEAKING SO SLOWLY THAT OTHER PEOPLE COULD HAVE NOTICED. OR THE OPPOSITE, BEING SO FIGETY OR RESTLESS THAT YOU HAVE BEEN MOVING AROUND A LOT MORE THAN USUAL: NOT AT ALL
SUM OF ALL RESPONSES TO PHQ QUESTIONS 1-9: 0
7. TROUBLE CONCENTRATING ON THINGS, SUCH AS READING THE NEWSPAPER OR WATCHING TELEVISION: NOT AT ALL

## 2025-01-20 NOTE — PROGRESS NOTES
Identified pt with two pt identifiers(name and )    Chief Complaint   Patient presents with    Discuss Medications     Would like to discuss starting a new medication        Health Maintenance Due   Topic    Pneumococcal 0-64 years Vaccine (1 of 2 - PCV)    Hepatitis B vaccine (1 of 3 - 19+ 3-dose series)    Colorectal Cancer Screen     Flu vaccine (1)    COVID-19 Vaccine ( season)       Wt Readings from Last 3 Encounters:   24 84 kg (185 lb 3.2 oz)   24 90.7 kg (200 lb)   24 86.2 kg (190 lb)     Temp Readings from Last 3 Encounters:   24 98.2 °F (36.8 °C) (Oral)   24 98.1 °F (36.7 °C) (Oral)   24 98.4 °F (36.9 °C) (Oral)     BP Readings from Last 3 Encounters:   24 116/80   24 122/78   24 116/74     Pulse Readings from Last 3 Encounters:   24 83   24 87   24 86           Depression Screening:  :         2025    11:50 AM 2024     9:54 AM 9/10/2024    12:55 PM 2024    10:28 AM 2024     3:44 PM   PHQ-9 Questionaire   Little interest or pleasure in doing things 0 0 0 1 1   Feeling down, depressed, or hopeless 0 0 0 1 1   Trouble falling or staying asleep, or sleeping too much 0 0  1 0   Feeling tired or having little energy 0 0  3 1   Poor appetite or overeating 0 0  0 0   Feeling bad about yourself - or that you are a failure or have let yourself or your family down 0 0  1 2   Trouble concentrating on things, such as reading the newspaper or watching television 0 0  1 1   Moving or speaking so slowly that other people could have noticed. Or the opposite - being so fidgety or restless that you have been moving around a lot more than usual 0 0  1 0   Thoughts that you would be better off dead, or of hurting yourself in some way 0 0  0 0   PHQ-9 Total Score 0 0 0 9 6   If you checked off any problems, how difficult have these problems made it for you to do your work, take care of things at home, or get along with

## 2025-01-20 NOTE — PROGRESS NOTES
[facility]  [address] [place] [address]  Phone: [phone]  Fax: [phone]      Fernandez Paz, was evaluated through a synchronous (real-time) audio-video encounter. The patient (or guardian if applicable) is aware that this is a billable service, which includes applicable co-pays. This Virtual Visit was conducted with patient's (and/or legal guardian's) consent. Patient identification was verified, and a caregiver was present when appropriate.   The patient was located at Home: [address] [facility] [address]  Provider was located at Facility (Appt Dept): [facility] [place] [address]  Confirm you are appropriately licensed, registered, or certified to deliver care in the state where the patient is located as indicated above. If you are not or unsure, please re-schedule the visit: Yes, I confirm.       Chief Complaint   Patient presents with    Discuss Medications     Would like to discuss starting a new medication     She is a 44 y.o. female who presents for virtual follow up visit.    She is complaining of increased insomina over the past couple of months.  Says that this has been worsening.  Says that she will lay in bed and be unbale to sleep.  Thoguhts are racing.  Does look at screens up until the point of trying to fall asleep.  Has tried OTC sleep aides/melatonin--nothing really helping.    She is currently in the process of moving and states that there is a lot of stress associated with this.    Has a history of anxiety that we have been attempting to manage.  Never started the wellbutrin that we discussed at her last visit.  Prior to this, she had stopped prozac on her own.  Wondering if she should get back on this.  Takes hydroxyzine 2-3x/week.  Says that this helps when she takes it.    She is requesting ambien to help with sleep.  Says that she has been on this before and it helped.    Needs a refill of her albuterol

## 2025-08-05 ENCOUNTER — TELEPHONE (OUTPATIENT)
Age: 45
End: 2025-08-05

## (undated) DEVICE — BLADELESS OPTICAL TROCAR WITH FIXATION CANNULA: Brand: VERSAPORT

## (undated) DEVICE — UNIVERSAL FIXATION CANNULA: Brand: VERSAONE

## (undated) DEVICE — TUBING SUCT 10FR MAL ALUM SHFT FN CAP VENT UNIV CONN W/ OBT

## (undated) DEVICE — STAPLER INT AD L L25MM DIA12MM INTLUMN WHT TI CIR CUT 2 ROW

## (undated) DEVICE — ELECTRODE ES 36CM LAP FLAT L HK COAT DISP CLEANCOAT

## (undated) DEVICE — DRAPE SHT 3 QTR PROXIMA 53X77 --

## (undated) DEVICE — 1200 GUARD II KIT W/5MM TUBE W/O VAC TUBE: Brand: GUARDIAN

## (undated) DEVICE — KENDALL SCD EXPRESS SLEEVES, KNEE LENGTH, MEDIUM: Brand: KENDALL SCD

## (undated) DEVICE — STERILE POLYISOPRENE POWDER-FREE SURGICAL GLOVES WITH EMOLLIENT COATING: Brand: PROTEXIS

## (undated) DEVICE — MEDI-VAC NON-CONDUCTIVE SUCTION TUBING: Brand: CARDINAL HEALTH

## (undated) DEVICE — TUBING INSUFLTN 10FT LUER -- CONVERT TO ITEM 368568

## (undated) DEVICE — DRESSING,GAUZE,XEROFORM,CURAD,1"X8",ST: Brand: CURAD

## (undated) DEVICE — STERILE POLYISOPRENE POWDER-FREE SURGICAL GLOVES: Brand: PROTEXIS

## (undated) DEVICE — STRAP,POSITIONING,KNEE/BODY,FOAM,4X60": Brand: MEDLINE

## (undated) DEVICE — ROCKER SWITCH PENCIL BLADE ELECTRODE, HOLSTER: Brand: EDGE

## (undated) DEVICE — INFECTION CONTROL KIT SYS

## (undated) DEVICE — SUTURE SZ 0 27IN 5/8 CIR UR-6  TAPER PT VIOLET ABSRB VICRYL J603H

## (undated) DEVICE — ARTICULATING RELOAD WITH TRI-STAPLE TECHNOLOGY: Brand: ENDO GIA

## (undated) DEVICE — BLADE SAW OSC COARSE 25X9MM --

## (undated) DEVICE — BNDG ELAS HK LOOP 4X5YD NS -- MATRIX

## (undated) DEVICE — DRAIN CHN 19FR L0.25MM DIA6.3MM SIL RND HUBLESS FULL FLUT

## (undated) DEVICE — DEVICE SUT VLT PRELD W/ POLYSRB 2-0 L48IN SUT DISP FOR LAP

## (undated) DEVICE — SYR 10ML LUER LOK 1/5ML GRAD --

## (undated) DEVICE — WOUND RETRACTOR AND PROTECTOR: Brand: ALEXIS WOUND PROTECTOR-RETRACTOR

## (undated) DEVICE — PREP SKN CHLRAPRP APL 26ML STR --

## (undated) DEVICE — GOWN,SIRUS,FABRNF,XL,20/CS: Brand: MEDLINE

## (undated) DEVICE — BLADELESS TROCAR WITH FIXATION CANNULA: Brand: VERSAPORT PLUS

## (undated) DEVICE — INTENDED FOR TISSUE SEPARATION, AND OTHER PROCEDURES THAT REQUIRE A SHARP SURGICAL BLADE TO PUNCTURE OR CUT.: Brand: BARD-PARKER ® CARBON RIB-BACK BLADES

## (undated) DEVICE — PACK,BASIC,SIRUS,V: Brand: MEDLINE

## (undated) DEVICE — SPECIMEN RETRIEVAL POUCH: Brand: ENDO CATCH GOLD

## (undated) DEVICE — STAPLER INT 60 UNIV PUR W/ TRI-STAPLE TECHNOLOGY ULT FOR

## (undated) DEVICE — BANDAGE,ELASTIC,ESMARK,STERILE,4"X9',LF: Brand: MEDLINE

## (undated) DEVICE — REM POLYHESIVE ADULT PATIENT RETURN ELECTRODE: Brand: VALLEYLAB

## (undated) DEVICE — 39" SINGLE PATIENT USE HOVERMATT BREATHABLE: Brand: SINGLE PATIENT USE HOVERMATT

## (undated) DEVICE — LAP-BAND SYSTEM CALIBRATION TUBE: Brand: LAP-BAND

## (undated) DEVICE — BLADELESS OPTICAL TROCAR WITH FIXATION CANNULA: Brand: VERSAONE

## (undated) DEVICE — NEEDLE HYPO 22GA L1.5IN BLK S STL HUB POLYPR SHLD REG BVL

## (undated) DEVICE — COVER LT HNDL PLAS RIG 1 PER PK

## (undated) DEVICE — SUTURING DEVICE: Brand: ENDO STITCH

## (undated) DEVICE — Device

## (undated) DEVICE — DEVICE SUT 0 L48IN GRN POLY BRAID LD UNIT DISP SURGDAC

## (undated) DEVICE — SOL IRRIGATION INJ NACL 0.9% 500ML BTL

## (undated) DEVICE — STAPLER INT W25MM WHT TRNSOR CIR ANVIL W/ ADVANCING PROX

## (undated) DEVICE — SHEAR HARMONIC 5MMX45CM -- ACE 7+

## (undated) DEVICE — SOLUTION IRRIG 1000ML H2O STRL BLT

## (undated) DEVICE — VISUALIZATION SYSTEM: Brand: CLEARIFY

## (undated) DEVICE — Z INACTIVE USE 2240337 DRAPE SURG PT TRANSFER TRAWAY SHT

## (undated) DEVICE — SOLUTION IV 1000ML 0.9% SOD CHL

## (undated) DEVICE — ZIMMER® STERILE DISPOSABLE TOURNIQUET CUFF WITH PROTECTIVE SLEEVE AND PLC, DUAL PORT, SINGLE BLADDER, 18 IN. (46 CM)

## (undated) DEVICE — SURGICAL PROCEDURE PACK BASIN MAJ SET CUST NO CAUT

## (undated) DEVICE — BANDAGE,GAUZE,BULKEE II,4.5"X4.1YD,STRL: Brand: MEDLINE

## (undated) DEVICE — SUT ETHLN 2-0 18IN FS BLK --

## (undated) DEVICE — SUTURE MCRYL SZ 4-0 L27IN ABSRB UD L19MM PS-2 1/2 CIR PRIM Y426H

## (undated) DEVICE — DRAPE C ARM W54XL84IN MINI FOR OEC 6800

## (undated) DEVICE — GAUZE,SPONGE,FLUFF,6"X6.75",STRL,5/TRAY: Brand: MEDLINE

## (undated) DEVICE — DRAPE,U/ SHT,SPLIT,PLAS,STERIL: Brand: MEDLINE

## (undated) DEVICE — GOWN,SIRUS,NONRNF,SETINSLV,XL,20/CS: Brand: MEDLINE

## (undated) DEVICE — POWER SHELL: Brand: SIGNIA

## (undated) DEVICE — (D)PREP SKN CHLRAPRP APPL 26ML -- CONVERT TO ITEM 371833

## (undated) DEVICE — ENDO CARRY-ON PROCEDURE KIT INCLUDES ENZYMATIC SPONGE, GAUZE, BIOHAZARD LABEL, TRAY, LUBRICANT, DIRTY SCOPE LABEL, WATER LABEL, TRAY, DRAWSTRING PAD, AND DEFENDO 4-PIECE KIT.: Brand: ENDO CARRY-ON PROCEDURE KIT

## (undated) DEVICE — DRAPE,EXTREMITY,89X128,STERILE: Brand: MEDLINE

## (undated) DEVICE — TROCAR SITE CLOSURE DEVICE: Brand: ENDO CLOSE

## (undated) DEVICE — CANISTER, RIGID, 3000CC: Brand: MEDLINE INDUSTRIES, INC.

## (undated) DEVICE — SURGICAL PROCEDURE KIT GEN LAPAROSCOPY LF

## (undated) DEVICE — 3000CC GUARDIAN II: Brand: GUARDIAN

## (undated) DEVICE — TOWEL SURG W17XL27IN STD BLU COT NONFENESTRATED PREWASHED

## (undated) DEVICE — PAD,ABDOMINAL,5"X9",ST,LF,25/BX: Brand: MEDLINE INDUSTRIES, INC.

## (undated) DEVICE — APPLICATOR BNDG 1MM ADH PREMIERPRO EXOFIN

## (undated) DEVICE — TAPE,CLOTH/SILK,CURAD,3"X10YD,LF,40/CS: Brand: CURAD

## (undated) DEVICE — SUTURE VCRL SZ 3-0 L27IN ABSRB VLT L26MM SH 1/2 CIR J316H

## (undated) DEVICE — INTRODUCER DEVICE: Brand: EEA

## (undated) DEVICE — FILTER SMK EVAC FLO CLR MEGADYNE

## (undated) DEVICE — CLICKLINE SCISSORS INSERT: Brand: CLICKLINE